# Patient Record
Sex: FEMALE | Race: WHITE | NOT HISPANIC OR LATINO | Employment: OTHER | URBAN - NONMETROPOLITAN AREA
[De-identification: names, ages, dates, MRNs, and addresses within clinical notes are randomized per-mention and may not be internally consistent; named-entity substitution may affect disease eponyms.]

---

## 2017-11-28 ENCOUNTER — OFFICE VISIT (OUTPATIENT)
Dept: GASTROENTEROLOGY | Facility: CLINIC | Age: 57
End: 2017-11-28

## 2017-11-28 VITALS
SYSTOLIC BLOOD PRESSURE: 112 MMHG | DIASTOLIC BLOOD PRESSURE: 78 MMHG | WEIGHT: 154 LBS | HEART RATE: 72 BPM | BODY MASS INDEX: 24.17 KG/M2 | HEIGHT: 67 IN

## 2017-11-28 DIAGNOSIS — R10.84 GENERALIZED ABDOMINAL PAIN: ICD-10-CM

## 2017-11-28 DIAGNOSIS — K21.00 GASTROESOPHAGEAL REFLUX DISEASE WITH ESOPHAGITIS: ICD-10-CM

## 2017-11-28 DIAGNOSIS — R11.0 NAUSEA: ICD-10-CM

## 2017-11-28 DIAGNOSIS — R19.7 DIARRHEA, UNSPECIFIED TYPE: Primary | ICD-10-CM

## 2017-11-28 LAB — AMYLASE SERPL-CCNC: 60 U/L (ref 25–140)

## 2017-11-28 PROCEDURE — 86003 ALLG SPEC IGE CRUDE XTRC EA: CPT | Performed by: PHYSICIAN ASSISTANT

## 2017-11-28 PROCEDURE — 82150 ASSAY OF AMYLASE: CPT | Performed by: INTERNAL MEDICINE

## 2017-11-28 PROCEDURE — 99214 OFFICE O/P EST MOD 30 MIN: CPT | Performed by: PHYSICIAN ASSISTANT

## 2017-11-28 PROCEDURE — 83690 ASSAY OF LIPASE: CPT | Performed by: PHYSICIAN ASSISTANT

## 2017-11-28 PROCEDURE — 83519 RIA NONANTIBODY: CPT | Performed by: PHYSICIAN ASSISTANT

## 2017-11-28 PROCEDURE — 83516 IMMUNOASSAY NONANTIBODY: CPT | Performed by: PHYSICIAN ASSISTANT

## 2017-11-28 PROCEDURE — 36415 COLL VENOUS BLD VENIPUNCTURE: CPT | Performed by: INTERNAL MEDICINE

## 2017-11-28 RX ORDER — DIVALPROEX SODIUM 500 MG/1
500 TABLET, DELAYED RELEASE ORAL 2 TIMES DAILY
COMMUNITY
End: 2018-06-26 | Stop reason: ALTCHOICE

## 2017-11-28 RX ORDER — PROPRANOLOL HYDROCHLORIDE 40 MG/1
40 TABLET ORAL 2 TIMES DAILY
COMMUNITY
End: 2018-06-26 | Stop reason: ALTCHOICE

## 2017-11-28 RX ORDER — DEXTROSE AND SODIUM CHLORIDE 5; .45 G/100ML; G/100ML
30 INJECTION, SOLUTION INTRAVENOUS CONTINUOUS PRN
Status: CANCELLED | OUTPATIENT
Start: 2017-12-06

## 2017-11-28 RX ORDER — PIOGLITAZONEHYDROCHLORIDE 30 MG/1
30 TABLET ORAL DAILY
COMMUNITY

## 2017-11-28 RX ORDER — PANTOPRAZOLE SODIUM 40 MG/1
40 TABLET, DELAYED RELEASE ORAL NIGHTLY
COMMUNITY
End: 2018-01-23 | Stop reason: ALTCHOICE

## 2017-11-28 RX ORDER — BUSPIRONE HYDROCHLORIDE 7.5 MG/1
7.5 TABLET ORAL 3 TIMES DAILY
COMMUNITY
End: 2018-01-23 | Stop reason: SDDI

## 2017-11-28 RX ORDER — SODIUM, POTASSIUM,MAG SULFATES 17.5-3.13G
SOLUTION, RECONSTITUTED, ORAL ORAL
Qty: 1 BOTTLE | Refills: 0 | Status: ON HOLD | OUTPATIENT
Start: 2017-11-28 | End: 2017-12-06

## 2017-11-29 LAB — LIPASE SERPL-CCNC: 104 U/L (ref 23–300)

## 2017-11-29 RX ORDER — PRAVASTATIN SODIUM 20 MG
20 TABLET ORAL DAILY
COMMUNITY

## 2017-11-29 RX ORDER — MELOXICAM 15 MG/1
15 TABLET ORAL DAILY
COMMUNITY

## 2017-11-30 LAB
ADV 40+41 DNA STL QL NAA+NON-PROBE: NOT DETECTED
ASTRO TYP 1-8 RNA STL QL NAA+NON-PROBE: NOT DETECTED
C CAYETANENSIS DNA STL QL NAA+NON-PROBE: NOT DETECTED
C DIFF TOX GENS STL QL NAA+PROBE: NOT DETECTED
CAMPY SP DNA.DIARRHEA STL QL NAA+PROBE: NOT DETECTED
CRYPTOSP STL CULT: NOT DETECTED
E COLI DNA SPEC QL NAA+PROBE: NOT DETECTED
E HISTOLYT AG STL-ACNC: NOT DETECTED
EAEC PAA PLAS AGGR+AATA ST NAA+NON-PRB: NOT DETECTED
EC STX1+STX2 GENES STL QL NAA+NON-PROBE: NOT DETECTED
EPEC EAE GENE STL QL NAA+NON-PROBE: NOT DETECTED
ETEC LTA+ST1A+ST1B TOX ST NAA+NON-PROBE: NOT DETECTED
G LAMBLIA DNA SPEC QL NAA+PROBE: NOT DETECTED
GLIADIN PEPTIDE IGA SER-ACNC: 5 UNITS (ref 0–19)
GLIADIN PEPTIDE IGG SER-ACNC: 4 UNITS (ref 0–19)
NOROVIRUS GI+II RNA STL QL NAA+NON-PROBE: NOT DETECTED
P SHIGELLOIDES DNA STL QL NAA+NON-PROBE: NOT DETECTED
RV RNA STL NAA+PROBE: NOT DETECTED
SALMONELLA DNA SPEC QL NAA+PROBE: NOT DETECTED
SAPO I+II+IV+V RNA STL QL NAA+NON-PROBE: NOT DETECTED
SHIGELLA SP+EIEC IPAH ST NAA+NON-PROBE: NOT DETECTED
TTG IGA SER-ACNC: <2 U/ML (ref 0–3)
TTG IGG SER-ACNC: <2 U/ML (ref 0–5)
V CHOLERAE DNA SPEC QL NAA+PROBE: NOT DETECTED
VIBRIO DNA SPEC NAA+PROBE: NOT DETECTED
YERSINIA STL CULT: NOT DETECTED

## 2017-11-30 PROCEDURE — 87507 IADNA-DNA/RNA PROBE TQ 12-25: CPT | Performed by: PHYSICIAN ASSISTANT

## 2017-12-01 ENCOUNTER — OFFICE VISIT (OUTPATIENT)
Dept: OTOLARYNGOLOGY | Facility: CLINIC | Age: 57
End: 2017-12-01

## 2017-12-01 VITALS — BODY MASS INDEX: 23.54 KG/M2 | OXYGEN SATURATION: 98 % | WEIGHT: 150 LBS | HEIGHT: 67 IN | HEART RATE: 85 BPM

## 2017-12-01 DIAGNOSIS — H60.63 CHRONIC NON-INFECTIVE OTITIS EXTERNA OF BOTH EARS, UNSPECIFIED TYPE: Primary | ICD-10-CM

## 2017-12-01 DIAGNOSIS — H90.3 ASYMMETRICAL SENSORINEURAL HEARING LOSS: ICD-10-CM

## 2017-12-01 DIAGNOSIS — J31.0 CHRONIC RHINITIS, UNSPECIFIED TYPE: ICD-10-CM

## 2017-12-01 LAB — TRYPSIN: 471 NG/ML (ref 169–773)

## 2017-12-01 PROCEDURE — 99213 OFFICE O/P EST LOW 20 MIN: CPT | Performed by: OTOLARYNGOLOGY

## 2017-12-01 RX ORDER — FLUTICASONE PROPIONATE 50 MCG
2 SPRAY, SUSPENSION (ML) NASAL DAILY
Qty: 16 G | Refills: 11 | Status: SHIPPED | OUTPATIENT
Start: 2017-12-01 | End: 2018-12-05 | Stop reason: SDUPTHER

## 2017-12-03 LAB
CALIF WALNUT POLN IGE QN: <0.1 KU/L
CODFISH IGE QN: <0.1 KU/L
CONV CLASS DESCRIPTION: NORMAL
COW MILK IGE QN: <0.1 KU/L
EGG WHITE IGE QN: <0.1 KU/L
GLUTEN IGE QN: <0.1 KU/L
HAZELNUT IGE QN: <0.1 KU/L
PEANUT IGE QN: <0.1 KU/L
SCALLOP IGE QN: <0.1 KU/L
SESAME SEED IGE: <0.1 KU/L
SHRIMP IGE: <0.1 KU/L
SOYBEAN IGE QN: <0.1 KU/L
WHEAT IGE QN: <0.1 KU/L

## 2017-12-04 NOTE — PROGRESS NOTES
Subjective   Sravanthi Ellison is a 57 y.o. female.       History of Present Illness   Patient has been followed with asymmetric sensorineural hearing loss primarily on the left that is been stable for quite some time.  Plan was to obtain an audiogram every other year.  Reports that earlier this year she suffered a traumatic brain injury and this is affected her memory somewhat.  Has not been using her Flonase.  Says her nose is congested a good bit.  Feels like her ears are about the same with no otorrhea doesn't feel like her hearing is changed.  Does not recall being told that she had a temporal bone injury.  No otorrhea.      The following portions of the patient's history were reviewed and updated as appropriate: allergies, current medications, past family history, past medical history, past social history, past surgical history and problem list.     reports that she has never smoked. She has never used smokeless tobacco. She reports that she does not drink alcohol or use illicit drugs.   Patient is not a tobacco user and has not been counseled for use of tobacco products      Review of Systems   Constitutional: Negative for fever.           Objective   Physical Exam    Ears: External ears no deformity, canal shows some squamous debris bilaterally that cleaned under the microscope.  Beyond this, tympanic membranes intact clear and mobile bilaterally.  Nose: Nares show mild clear discharge, no mass polyp or purulence.  Boggy mucosa is present.  No gross external deformity.  Septum: Midline  Oral cavity: Lips and gums without lesions.  Tongue and floor of mouth without lesions.  Parotid and submandibular ducts unobstructed.  No mucosal lesions on the buccal mucosa or vestibule of the mouth.  Pharynx: No erythema exudate mass or ulcer  Neck: No lymphadenopathy.  No thyromegaly.  Trachea and larynx midline.  No masses in the parotid or submandibular glands.      Assessment/Plan   Sravanthi was seen today for  follow-up.    Diagnoses and all orders for this visit:    Chronic non-infective otitis externa of both ears, unspecified type    Chronic rhinitis, unspecified type    Asymmetrical sensorineural hearing loss        Plan: Restart Flonase 2 sprays each nostril daily.  Ears cleaned as described above.  Since the patient's hearing is not subjectively changed just reevaluate in 1 year as was previously planned, call sooner for problems.

## 2017-12-06 ENCOUNTER — HOSPITAL ENCOUNTER (OUTPATIENT)
Facility: HOSPITAL | Age: 57
Setting detail: HOSPITAL OUTPATIENT SURGERY
Discharge: HOME | End: 2017-12-06
Attending: INTERNAL MEDICINE | Admitting: INTERNAL MEDICINE

## 2017-12-06 ENCOUNTER — ANESTHESIA (OUTPATIENT)
Dept: GASTROENTEROLOGY | Facility: HOSPITAL | Age: 57
End: 2017-12-06

## 2017-12-06 ENCOUNTER — ANESTHESIA EVENT (OUTPATIENT)
Dept: GASTROENTEROLOGY | Facility: HOSPITAL | Age: 57
End: 2017-12-06

## 2017-12-06 VITALS
TEMPERATURE: 96.8 F | BODY MASS INDEX: 23.78 KG/M2 | RESPIRATION RATE: 20 BRPM | OXYGEN SATURATION: 100 % | SYSTOLIC BLOOD PRESSURE: 95 MMHG | HEART RATE: 82 BPM | DIASTOLIC BLOOD PRESSURE: 60 MMHG | WEIGHT: 151.5 LBS | HEIGHT: 67 IN

## 2017-12-06 DIAGNOSIS — K21.00 GASTROESOPHAGEAL REFLUX DISEASE WITH ESOPHAGITIS: ICD-10-CM

## 2017-12-06 DIAGNOSIS — R10.84 GENERALIZED ABDOMINAL PAIN: ICD-10-CM

## 2017-12-06 DIAGNOSIS — R19.7 DIARRHEA, UNSPECIFIED TYPE: ICD-10-CM

## 2017-12-06 DIAGNOSIS — R11.0 NAUSEA: ICD-10-CM

## 2017-12-06 LAB — GLUCOSE BLDC GLUCOMTR-MCNC: 81 MG/DL (ref 70–130)

## 2017-12-06 PROCEDURE — 88305 TISSUE EXAM BY PATHOLOGIST: CPT | Performed by: PATHOLOGY

## 2017-12-06 PROCEDURE — 88342 IMHCHEM/IMCYTCHM 1ST ANTB: CPT | Performed by: PATHOLOGY

## 2017-12-06 PROCEDURE — 25010000002 PROPOFOL 10 MG/ML EMULSION: Performed by: NURSE ANESTHETIST, CERTIFIED REGISTERED

## 2017-12-06 PROCEDURE — 45380 COLONOSCOPY AND BIOPSY: CPT | Performed by: INTERNAL MEDICINE

## 2017-12-06 PROCEDURE — 25010000002 MIDAZOLAM PER 1 MG: Performed by: NURSE ANESTHETIST, CERTIFIED REGISTERED

## 2017-12-06 PROCEDURE — 88305 TISSUE EXAM BY PATHOLOGIST: CPT | Performed by: INTERNAL MEDICINE

## 2017-12-06 PROCEDURE — 88342 IMHCHEM/IMCYTCHM 1ST ANTB: CPT | Performed by: INTERNAL MEDICINE

## 2017-12-06 PROCEDURE — 43239 EGD BIOPSY SINGLE/MULTIPLE: CPT | Performed by: INTERNAL MEDICINE

## 2017-12-06 PROCEDURE — 82962 GLUCOSE BLOOD TEST: CPT

## 2017-12-06 RX ORDER — LIDOCAINE HYDROCHLORIDE 10 MG/ML
INJECTION, SOLUTION INFILTRATION; PERINEURAL AS NEEDED
Status: DISCONTINUED | OUTPATIENT
Start: 2017-12-06 | End: 2017-12-06 | Stop reason: SURG

## 2017-12-06 RX ORDER — DEXTROSE AND SODIUM CHLORIDE 5; .45 G/100ML; G/100ML
30 INJECTION, SOLUTION INTRAVENOUS CONTINUOUS PRN
Status: DISCONTINUED | OUTPATIENT
Start: 2017-12-06 | End: 2017-12-06 | Stop reason: HOSPADM

## 2017-12-06 RX ORDER — PROPOFOL 10 MG/ML
VIAL (ML) INTRAVENOUS AS NEEDED
Status: DISCONTINUED | OUTPATIENT
Start: 2017-12-06 | End: 2017-12-06 | Stop reason: SURG

## 2017-12-06 RX ORDER — MIDAZOLAM HYDROCHLORIDE 1 MG/ML
INJECTION INTRAMUSCULAR; INTRAVENOUS AS NEEDED
Status: DISCONTINUED | OUTPATIENT
Start: 2017-12-06 | End: 2017-12-06 | Stop reason: SURG

## 2017-12-06 RX ADMIN — LIDOCAINE HYDROCHLORIDE 60 MG: 10 INJECTION, SOLUTION INFILTRATION; PERINEURAL at 10:43

## 2017-12-06 RX ADMIN — PROPOFOL 20 MG: 10 INJECTION, EMULSION INTRAVENOUS at 10:55

## 2017-12-06 RX ADMIN — PROPOFOL 20 MG: 10 INJECTION, EMULSION INTRAVENOUS at 10:58

## 2017-12-06 RX ADMIN — PROPOFOL 30 MG: 10 INJECTION, EMULSION INTRAVENOUS at 10:45

## 2017-12-06 RX ADMIN — MIDAZOLAM 2 MG: 1 INJECTION INTRAMUSCULAR; INTRAVENOUS at 10:43

## 2017-12-06 RX ADMIN — PROPOFOL 20 MG: 10 INJECTION, EMULSION INTRAVENOUS at 10:50

## 2017-12-06 RX ADMIN — PROPOFOL 20 MG: 10 INJECTION, EMULSION INTRAVENOUS at 10:52

## 2017-12-06 RX ADMIN — DEXTROSE AND SODIUM CHLORIDE 30 ML/HR: 5; 450 INJECTION, SOLUTION INTRAVENOUS at 10:11

## 2017-12-06 RX ADMIN — PROPOFOL 70 MG: 10 INJECTION, EMULSION INTRAVENOUS at 10:43

## 2017-12-06 RX ADMIN — PROPOFOL 20 MG: 10 INJECTION, EMULSION INTRAVENOUS at 10:48

## 2017-12-06 NOTE — ANESTHESIA PREPROCEDURE EVALUATION
Anesthesia Evaluation     no history of anesthetic complications:  NPO Solid Status: > 8 hours  NPO Liquid Status: > 2 hours     Airway   Mallampati: II  TM distance: >3 FB  Neck ROM: full  no difficulty expected  Dental - normal exam     Pulmonary - negative pulmonary ROS and normal exam   Cardiovascular - negative cardio ROS and normal exam        Neuro/Psych    ROS Comment: 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

## 2017-12-06 NOTE — PLAN OF CARE
Problem: Patient Care Overview (Adult)  Goal: Plan of Care Review    12/06/17 1102   Coping/Psychosocial Response Interventions   Plan Of Care Reviewed With patient   Patient Care Overview   Progress no change   Outcome Evaluation   Outcome Summary/Follow up Plan vss         Problem: GI Endoscopy (Adult)  Goal: Signs and Symptoms of Listed Potential Problems Will be Absent or Manageable (GI Endoscopy)    12/06/17 1102   GI Endoscopy   Problems Assessed (GI Endoscopy) all   Problems Present (GI Endoscopy) none

## 2017-12-06 NOTE — ANESTHESIA POSTPROCEDURE EVALUATION
Patient: Sravanthi Ellison    Procedure Summary     Date Anesthesia Start Anesthesia Stop Room / Location    12/06/17 1043 1102 [facility] ENDOSCOPY 1 / [facility]       Procedure Diagnosis Surgeon Provider    ESOPHAGOGASTRODUODENOSCOPY--small bowel biopsies (N/A Esophagus); COLONOSCOPY--look TI, bx (N/A ) Nausea; Generalized abdominal pain; Gastroesophageal reflux disease with esophagitis; Diarrhea, unspecified type  (Nausea [R11.0]; Generalized abdominal pain [R10.84]; Gastroesophageal reflux disease with esophagitis [K21.0]; Diarrhea, unspecified type [R19.7]) MD Farzaneh Sandoval CRNA          Anesthesia Type: MAC  Last vitals  BP   114/77 (12/06/17 0956)   Temp   97.9 °F (36.6 °C) (12/06/17 0956)   Pulse   73 (12/06/17 0956)   Resp   18 (12/06/17 0956)     SpO2   98 % (12/06/17 0956)     Post Anesthesia Care and Evaluation    Patient location during evaluation: bedside  Patient participation: complete - patient participated  Level of consciousness: awake and alert  Pain management: adequate  Airway patency: patent  Anesthetic complications: No anesthetic complications  PONV Status: none  Cardiovascular status: acceptable  Respiratory status: acceptable  Hydration status: acceptable

## 2017-12-06 NOTE — H&P (VIEW-ONLY)
Chief Complaint   Patient presents with   • Abdominal Pain     Ref. Tracey ZEPEDA   • Spastic Colon       ENDO PROCEDURE ORDERED: EGD/COLON, GERD, N/D, abd pain-==BX small bowel, look TI, bx     Subjective    Sravanthi Ellison is a 57 y.o. female. she is being seen for consultation today at the request of DUANE Vanegas.    History of Present Illness    This 57-year-old retired female was sent for consultation for abdominal pain, diarrhea by Ijeoma Joseph who saw the patient with worsening IBS on 9/29/17.  Laboratories from her office on 10/17/17 showed normal cholesterol, CMP showed glucose 159, otherwise normal.  A1c 6.5%.  Normal CBC, B12, valproic acid, urinalysis showed glucose with a UTI.  She does see Dr. Botello after the first of the year.  She has had a previous cholecystectomy.  She last saw Dr. Armas on 1/18/12 showed a hiatal hernia, diverticulosis, he treated her with Protonix and Xifaxan but she does not recall whether this improved her symptoms.  She states she recently had a traumatic brain injury in June and has been recovering from that.    Patient presents with 2 out of 10 mid-upper abdominal pain.  She feels soreness in the epigastric region.  She states she's had a lot of problems with her stomach.  She is on Protonix 40 mg daily.  She complains nausea but no vomiting.  She denied dysphagia.  She will sometimes have urgent bowel movements after she eats.  Sometimes they initially will not pass, then she will have a gastric stool.  She doesn't describe any oral or floating stools.  She seen bright red blood on the tissue at times.  Her weight is down 30 pounds since she last saw Dr. Armas.    Last EGD by Dr. Rodriguez on 11/12/15 showed esophagitis, gastritis, hiatal hernia, biopsy showed chronic inflammation with negative H. pylori.  Last EGD/colonoscopy by Dr. Armas on 12/12/11 showed 3 cm hiatal hernia, gastritis, diverticulosis with normal terminal ileum.    Patient denies  tobacco, alcohol, illicit substance use.  She fell off a horse with a traumatic brain injury in .  She has diabetes, she's had a breast biopsy, ear surgery, cholecystectomy.  Family history diabetes, heart disease, cancer, hypertension.  Father  age 83 with heart failure.  Mother  age 59, spouse at age 60 both of cancer.  2 brothers, one sister living in good health.    A/P: Patient is on multiple medications which may be contributing to her symptoms, she has nausea, abdominal pain, variable bowel habits, weight loss, blood in her stool, known diverticular disease.  Recommend EGD/colonoscopy, we'll do biopsies of the small bowel to evaluate for possible celiac disease.  Recommend colonoscopy with biopsies will also attempt to look at the terminal ileum.  I recommended amylase, lipase, recurrent GI distress panel, serum trypsin, stool pathogen panel.  I'm suspicious for pancreatic insufficiency.  We'll see her in follow-up after the above, further pending clinical course and the results of the above.    Thank you very much Ijeoma for this consultation, and for allowing us to participate in the care of your patient.  We'll keep you informed.     The following portions of the patient's history were reviewed and updated as appropriate:   Past Medical History:   Diagnosis Date   • Diabetes mellitus    • Diarrhea    • Diverticular disease of colon    • Gastroesophageal reflux disease    • Hiatal hernia    • Irritable bowel syndrome    • Nausea    • Right upper quadrant pain    • Traumatic brain injury 2017     Past Surgical History:   Procedure Laterality Date   • CHOLECYSTECTOMY     • COLONOSCOPY  2011    Mild diverticulosis in sigmoid colon. Stool collected for study.   • ENDOSCOPY W/ PEG TUBE PLACEMENT  2011    Normal hypopharynx, esophagus, symmetrical & patent pylorus. Hiatus hernia in GE junction. Non-erosive gastritis in stomach. Multiple biopsies taken. Normal duodenum. Multiple biopsies  taken.   • UPPER GASTROINTESTINAL ENDOSCOPY  12/12/2011     Family History   Problem Relation Age of Onset   • Breast cancer Other    • Diabetes Other    • Heart disease Other    • Hypertension Other    • Skin cancer Other      OB History     No data available        No Known Allergies  Social History     Social History   • Marital status:      Spouse name: N/A   • Number of children: N/A   • Years of education: N/A     Social History Main Topics   • Smoking status: Never Smoker   • Smokeless tobacco: Never Used   • Alcohol use No   • Drug use: No   • Sexual activity: Defer     Other Topics Concern   • None     Social History Narrative       Current Outpatient Prescriptions:   •  Bromocriptine Mesylate (CYCLOSET) 0.8 MG tablet, Take 0.8 mg by mouth Daily. 6 TABLETS DAILY, Disp: , Rfl:   •  busPIRone (BUSPAR) 7.5 MG tablet, Take 7.5 mg by mouth 3 (Three) Times a Day., Disp: , Rfl:   •  divalproex (DEPAKOTE) 500 MG DR tablet, Take 500 mg by mouth 2 (Two) Times a Day., Disp: , Rfl:   •  Empagliflozin (JARDIANCE) 25 MG tablet, Take 25 mg by mouth Daily., Disp: , Rfl:   •  Liraglutide (VICTOZA) 18 MG/3ML solution pen-injector injection, Inject 1.8 mg under the skin Daily., Disp: , Rfl:   •  MELATONIN PO, Take 3 mg by mouth Every Night., Disp: , Rfl:   •  metFORMIN (GLUCOPHAGE) 1000 MG tablet, Take 1,000 mg by mouth 2 (Two) Times a Day With Meals., Disp: , Rfl:   •  pantoprazole (PROTONIX) 40 MG EC tablet, Take 40 mg by mouth Every Night., Disp: , Rfl:   •  pioglitazone (ACTOS) 30 MG tablet, Take 30 mg by mouth Daily., Disp: , Rfl:   •  propranolol (INDERAL) 40 MG tablet, Take 40 mg by mouth 2 (Two) Times a Day., Disp: , Rfl:   •  fluticasone (FLONASE) 50 MCG/ACT nasal spray, 2 sprays into each nostril Daily., Disp: 16 g, Rfl: 11  •  meloxicam (MOBIC) 15 MG tablet, Take 15 mg by mouth Daily., Disp: , Rfl:   •  pravastatin (PRAVACHOL) 20 MG tablet, Take 20 mg by mouth Daily., Disp: , Rfl:   •   "sodium-potassium-magnesium sulfates (SUPREP) 17.5-3.13-1.6 GM/180ML solution oral solution, As directed per instruction sheet for colonoscopy, Disp: 1 bottle, Rfl: 0  Review of Systems  Review of Systems   Constitutional: Positive for unexpected weight change.   HENT: Negative for trouble swallowing.    Respiratory: Negative for apnea.    Gastrointestinal: Positive for abdominal pain, blood in stool, constipation, diarrhea and nausea. Negative for abdominal distention, anal bleeding, rectal pain and vomiting.   Genitourinary: Negative for difficulty urinating.   All other systems reviewed and are negative.         Objective    /78 (BP Location: Left arm)  Pulse 72  Ht 67\" (170.2 cm)  Wt 154 lb (69.9 kg)  BMI 24.12 kg/m2  Physical Exam   Constitutional: She is oriented to person, place, and time. She appears well-developed and well-nourished. No distress.   HENT:   Head: Normocephalic and atraumatic.   Eyes: EOM are normal. Pupils are equal, round, and reactive to light.   Neck: Normal range of motion.   Cardiovascular: Normal rate, regular rhythm and normal heart sounds.    Pulmonary/Chest: Effort normal and breath sounds normal.   Abdominal: Soft. Bowel sounds are normal. She exhibits distension. She exhibits no shifting dullness, no abdominal bruit, no ascites and no mass. There is no hepatosplenomegaly. There is tenderness. There is no rigidity, no rebound, no guarding and no CVA tenderness. No hernia. Hernia confirmed negative in the ventral area.   Mild diffuse, scar   Musculoskeletal: Normal range of motion.   Neurological: She is alert and oriented to person, place, and time.   Skin: Skin is warm and dry.   Psychiatric: She has a normal mood and affect. Her behavior is normal. Judgment and thought content normal.   Nursing note and vitals reviewed.    Assessment/Plan      1. Diarrhea, unspecified type    2. Generalized abdominal pain    3. Nausea    4. Gastroesophageal reflux disease with " esophagitis    .   Sravanthi was seen today for abdominal pain and spastic colon.    Diagnoses and all orders for this visit:    Diarrhea, unspecified type  -     Recurrent Gastrointestinal Distress  -     Gastrointestinal Panel, PCR - Stool, Per Rectum  -     Amylase  -     Lipase  -     Trypsin  -     Case Request; Standing  -     dextrose 5 % and sodium chloride 0.45 % infusion; Infuse 30 mL/hr into a venous catheter Continuous As Needed (Start Prior to Procedure).  -     Case Request  -     Allergens (12) Foods  -     Glia(IgA / G) & TTG(IgA / G)    Generalized abdominal pain  -     Recurrent Gastrointestinal Distress  -     Gastrointestinal Panel, PCR - Stool, Per Rectum  -     Amylase  -     Lipase  -     Trypsin  -     Case Request; Standing  -     dextrose 5 % and sodium chloride 0.45 % infusion; Infuse 30 mL/hr into a venous catheter Continuous As Needed (Start Prior to Procedure).  -     Case Request  -     Allergens (12) Foods  -     Glia(IgA / G) & TTG(IgA / G)    Nausea  -     Recurrent Gastrointestinal Distress  -     Gastrointestinal Panel, PCR - Stool, Per Rectum  -     Amylase  -     Lipase  -     Trypsin  -     Case Request; Standing  -     dextrose 5 % and sodium chloride 0.45 % infusion; Infuse 30 mL/hr into a venous catheter Continuous As Needed (Start Prior to Procedure).  -     Case Request  -     Allergens (12) Foods  -     Glia(IgA / G) & TTG(IgA / G)    Gastroesophageal reflux disease with esophagitis  -     Recurrent Gastrointestinal Distress  -     Gastrointestinal Panel, PCR - Stool, Per Rectum  -     Amylase  -     Lipase  -     Trypsin  -     Case Request; Standing  -     dextrose 5 % and sodium chloride 0.45 % infusion; Infuse 30 mL/hr into a venous catheter Continuous As Needed (Start Prior to Procedure).  -     Case Request  -     Allergens (12) Foods  -     Glia(IgA / G) & TTG(IgA / G)    Other orders  -     Obtain Informed Consent; Standing  -     POC Glucose Fingerstick; Standing  -      sodium-potassium-magnesium sulfates (SUPREP) 17.5-3.13-1.6 GM/180ML solution oral solution; As directed per instruction sheet for colonoscopy        Orders placed during this encounter include:  Orders Placed This Encounter   Procedures   • Gastrointestinal Panel, PCR - Stool, Per Rectum   • Recurrent Gastrointestinal Distress   • Amylase   • Lipase   • Trypsin   • Allergens (12) Foods   • Glia(IgA / G) & TTG(IgA / G)       Medications prescribed:  New Medications Ordered This Visit   Medications   • sodium-potassium-magnesium sulfates (SUPREP) 17.5-3.13-1.6 GM/180ML solution oral solution     Sig: As directed per instruction sheet for colonoscopy     Dispense:  1 bottle     Refill:  0     Discontinued Medications       Reason for Discontinue    CANAGLIFLOZIN PO Discontinued by another clinician    LIRAGLUTIDE SC Discontinued by another clinician    meloxicam (MOBIC) 15 MG tablet Discontinued by another clinician    PIOGLITAZONE HCL PO Discontinued by another clinician    pravastatin (PRAVACHOL) 20 MG tablet Discontinued by another clinician        Requested Prescriptions     Signed Prescriptions Disp Refills   • sodium-potassium-magnesium sulfates (SUPREP) 17.5-3.13-1.6 GM/180ML solution oral solution 1 bottle 0     Sig: As directed per instruction sheet for colonoscopy       Review and/or summary of lab tests, radiology, procedures, medications. Review and summary of old records and obtaining of history. The risks and benefits of my recommendations, as well as other treatment options were discussed with the patient today. Questions were answered.    Follow-up: Return in about 6 weeks (around 1/9/2018), or if symptoms worsen or fail to improve.     ESOPHAGOGASTRODUODENOSCOPY--small bowel biopsies (N/A), COLONOSCOPY--look TI, bx (N/A)      This document has been electronically signed by Abel Laboy PA-C on December 3, 2017 3:06 PM      Results for orders placed or performed in visit on 11/28/17    Gastrointestinal Panel, PCR - Stool, Per Rectum   Result Value Ref Range    Campylobacter Not Detected Not Detected    Clostridium difficile (toxin A/B) Not Detected Not Detected, NA formed stool, C diff not applicable on patient less than one year of age    Plesiomonas shigelloides Not Detected Not Detected    Salmonella Not Detected Not Detected    Vibrio Not Detected Not Detected    Vibrio cholerae Not Detected Not Detected    Yersinia enterocolitica Not Detected Not Detected    Enteroaggregative E. coli (EAEC) Not Detected Not Detected    Enteropathogenic E. coli (EPEC) Not Detected Not Detected    Enterotoxigenic E. coli (ETEC) lt/st Not Detected Not Detected    Shiga-like toxin-producing E. coli (STEC) stx1/stx2 Not Detected Not Detected    E. coli O157 Not Detected Not Detected    Shigella/Enteroinvasive E. coli (EIEC) Not Detected Not Detected    Cryptosporidium Not Detected Not Detected    Cyclospora cayetanensis Not Detected Not Detected    Entamoeba histolytica Not Detected Not Detected    Giardia lamblia Not Detected Not Detected    Adenovirus F40/41 Not Detected Not Detected    Astrovirus Not Detected Not Detected    Norovirus GI/GII Not Detected Not Detected    Rotavirus A Not Detected Not Detected    Sapovirus (I, II, IV or V) Not Detected Not Detected   Glia(IgA / G) & TTG(IgA / G)   Result Value Ref Range    Gliadin Deamidated Peptide Ab, IgA 5 0 - 19 units    Deaminated Gliadin Ab IgG 4 0 - 19 units    Tissue Transglutaminase IgA <2 0 - 3 U/mL    Tissue Transglutaminase IgG <2 0 - 5 U/mL   Allergens (12) Foods   Result Value Ref Range    Class Description Comment     Egg White <0.10 Class 0 kU/L    Milk, Cow's <0.10 Class 0 kU/L    CodFish <0.10 Class 0 kU/L    Sesame Seed <0.10 Class 0 kU/L    Peanut <0.10 Class 0 kU/L    Soybean <0.10 Class 0 kU/L    Hazelnut <0.10 Class 0 kU/L    Shrimp <0.10 Class 0 kU/L    Scallop <0.10 Class 0 kU/L    Gluten <0.10 Class 0 kU/L    Eros <0.10 Class 0 kU/L     Wheat <0.10 Class 0 kU/L   Trypsin   Result Value Ref Range    Trypsin 471 169 - 773 ng/mL   Lipase   Result Value Ref Range    Lipase 104 23 - 300 U/L   Amylase   Result Value Ref Range    Amylase 60 25 - 140 U/L   Results for orders placed or performed in visit on 11/07/16    COLONOSCOPY   Result Value Ref Range     Colonoscopy completed; ordered by Dr. Ijeoma Joseph    Results for orders placed or performed in visit on 12/12/11   Converted Surgical Pathology   Result Value Ref Range    Spec Descr 1 SPECIMEN(S): A SMALL BOWEL BIOPSY     Specimen description 2 SPECIMEN(S): B GASTRIC BIOPSY     Specimen description 3 SPECIMEN(S): C TERMINAL ILEUM BIOPSY     Specimen description 4 SPECIMEN(S): D MUCOSAL COLON BIOPSY     Preoperative Diagnosis   PREOPERATIVE DIAGNOSIS:  Diarrhea       Postoperative Diagnosis         POSTOPERATIVE DIAGNOSIS:  Small bowel (A), gastric mucosa (B), terminal ileum (C), colonic mucosa  (D)      Gross Description         GROSS DESCRIPTION:  In 4 containers, each of these show mucosal biopsies measuring up to 0.3  cm in greatest dimension.  Embedded accordingly.  (A) small bowel, (B)  gastric mucosa, (C) terminal ileum, (D) colonic mucosa.      Final Diagnosis         FINAL DIAGNOSIS:  A.  SMALL BOWEL, MUCOSAL BIOPSY:             NO SIGNIFICANT PATHOLOGIC DIAGNOSIS.  B.  GASTRIC MUCOSAL BIOPSY:             MILD CHRONIC INFLAMMATION.             NO EVIDENCE OF HELICOBACTER PYLORI (IP STAIN PERFORMED).  C.  TERMINAL ILEUM, MUCOSAL BIOPSY:             NO SIGNIFICANT PATHOLOGIC DIAGNOSIS.  D.  COLONIC MUCOSAL BIOPSY, RANDOM:             NO SIGNIFICANT PATHOLOGIC DIAGNOSIS.      Comment         COMMENT:  Helicobacter immunoperoxidase stain performed on block B.  HP immunostain was developed and its performance characteristics  determined by [facility].  It has not been  cleared or approved by the U.S. Food and Drug Administration.  The FDA  has determined that  "such clearance or approval is not necessary.  This  test is used for clinical purposes.  It should not be regarded as  investigational or for research.  This laboratory is certified under the  Clinical Laboratory Improvement Amendments of 1988 (CLIA-88) as  qualified to perform high complexity clinical laboratory testing.      CONVERTED (HISTORICAL) FINAL PATHOLOGIST       Diagnostician:  BESSIE ESTES M.D.  Pathologist  Electronically Signed 12/13/2011      Diagnosis Code   DIAGNOSIS CODE:  4      Results for orders placed or performed in visit on 11/25/03   Converted Surgical Pathology   Result Value Ref Range    Spec Descr 1 SPECIMEN(S): A GASTRIC BIOPSY     Gross Description         GROSS DESCRIPTION:  The specimen is labeled \"gastric mucosa\" and consists of two tan  fragments measuring 0.8 x 0.2 x 0.3 cm together.  Totally submitted.      Final Diagnosis         FINAL DIAGNOSIS:  GASTRIC MUCOSA, BIOPSY:            CHRONIC GASTRITIS SHOWING MINIMAL ACTIVITY.            A SPECIAL STAIN FOR HELICOBACTER PYLORI IS NEGATIVE.  [facility]    DIAGNOSIS CODE:  6      Comment   CLINICAL DIAGNOSIS:  Gastric mucosa biopsy       CONVERTED (HISTORICAL) FINAL PATHOLOGIST       Diagnostician:  JOY ALVARADO M.D.  Pathologist  Electronically Signed 11/28/2003     Results for orders placed or performed in visit on 04/01/03   Converted Surgical Pathology   Result Value Ref Range    Spec Descr 1 SPECIMEN(S): A MUCOSAL COLON BIOPSY     Gross Description         GROSS DESCRIPTION:  The container is labeled \"mucosa, colon (cold biopsy)\" and has nodular  bits of white soft tissue 0.6 cc in aggregate.  The entire specimen is  embedded.      Final Diagnosis         FINAL DIAGNOSIS:  MUCOSA, COLON:              FRAGMENTS OF MUCOSA OF THE COLON WITH A FEW BENIGN                   LYMPHOID NODULES.  [facility] [place]    DIAGNOSIS CODE:  2      Comment   CLINICAL DIAGNOSIS:  Diarrhea       CONVERTED (HISTORICAL) FINAL PATHOLOGIST       Diagnostician:  " ALVARO HEREDIA M.D.  Pathologist  Electronically Signed 04/02/2003         Some portions of this note have been dictated using voice recognition software and may contain errors and/or omissions.

## 2017-12-07 LAB
LAB AP CASE REPORT: NORMAL
LAB AP DIAGNOSIS COMMENT: NORMAL
Lab: NORMAL
PATH REPORT.FINAL DX SPEC: NORMAL
PATH REPORT.GROSS SPEC: NORMAL

## 2018-01-23 ENCOUNTER — OFFICE VISIT (OUTPATIENT)
Dept: GASTROENTEROLOGY | Facility: CLINIC | Age: 58
End: 2018-01-23

## 2018-01-23 VITALS
WEIGHT: 149 LBS | HEIGHT: 67 IN | DIASTOLIC BLOOD PRESSURE: 68 MMHG | HEART RATE: 83 BPM | BODY MASS INDEX: 23.39 KG/M2 | SYSTOLIC BLOOD PRESSURE: 100 MMHG

## 2018-01-23 DIAGNOSIS — R19.7 DIARRHEA, UNSPECIFIED TYPE: Primary | ICD-10-CM

## 2018-01-23 DIAGNOSIS — R10.84 GENERALIZED ABDOMINAL PAIN: ICD-10-CM

## 2018-01-23 DIAGNOSIS — R11.0 NAUSEA: ICD-10-CM

## 2018-01-23 DIAGNOSIS — K21.00 GASTROESOPHAGEAL REFLUX DISEASE WITH ESOPHAGITIS: ICD-10-CM

## 2018-01-23 PROCEDURE — 99214 OFFICE O/P EST MOD 30 MIN: CPT | Performed by: PHYSICIAN ASSISTANT

## 2018-01-23 RX ORDER — HYOSCYAMINE SULFATE EXTENDED-RELEASE 0.38 MG/1
0.38 TABLET ORAL NIGHTLY
Qty: 60 TABLET | Refills: 3 | Status: SHIPPED | OUTPATIENT
Start: 2018-01-23 | End: 2018-03-06 | Stop reason: SINTOL

## 2018-01-23 RX ORDER — OMEPRAZOLE 40 MG/1
40 CAPSULE, DELAYED RELEASE ORAL DAILY
Refills: 0 | COMMUNITY
Start: 2018-01-08 | End: 2021-12-07

## 2018-01-23 RX ORDER — DULOXETIN HYDROCHLORIDE 30 MG/1
60 CAPSULE, DELAYED RELEASE ORAL DAILY
Refills: 0 | COMMUNITY
Start: 2017-12-20

## 2018-01-23 NOTE — PROGRESS NOTES
Chief Complaint   Patient presents with   • Diarrhea   • Abdominal Pain   • Nausea   • Gerd With Esophagitis       ENDO PROCEDURE ORDERED:    Subjective    Sravanthi Ellison is a 57 y.o. female. she is here today for follow-up.    History of Present Illness    The patient is seen on recheck of her GERD, abdominal pain, diarrhea.  She was last seen 11/28/2017.  The patient did have laboratories with suspicion for pancreatic insufficiency, but showed normal amylase and lipase, trypsin level, recurrent GI distress panel, stool pathogen panel was negative. Fecal fat was not done.  The patient presents with 2 out of 10 abdominal pain. She is on Prilosec 40 mg daily for chronic GERD.  She denies vomiting.  She denies dysphagia.  She states she may go up to 3 days without a bowel movement, then has 7 or 8 bowel movements that are very watery.  Sometimes she is having problems with incontinence.  Her weight is down 5 pounds since last visit.      The patient had an EGD/colonoscopy in 2011 showed hiatal hernia, gastritis, diverticulosis.  Dr. Rodriguez did an EGD on 11/12/2015 showed a hiatal hernia, esophagitis, gastritis. The patient underwent EGD/colonoscopy on 12/07/2017 showed a diffuse gastritis.  Did not see a hiatal hernia in her photographs and her biopsy showed chronic gastritis, negative H-Pylori.  The duodenal biopsy was negative.  Colonoscopy showed internal/external hemorrhoids,fair prep.  Normal terminal ileum.  Biopsy from terminal ileum and colon were negative.  Recommended repeat colonoscopy in 5 years.    ASSESSMENT/PLAN:  Patient with diffuse gastritis, abdominal discomfort, nausea.  She is encouraged to avoid gastric irritants.  Encouraged to continue dietary modification and weight loss.  Will continue on the Prilosec. I suggested a trial on Levbid, I wrote the prescription for twice a day, but I suggested she start taking it at night to see if it helps with her bowel movement.  We discussed possible Kegel  exercises as well as perhaps physical [facility] evaluation [facility] floor dysfunction.  Will plan follow up in 6 weeks further pending clinical course and the results of above.                      The following portions of the patient's history were reviewed and updated as appropriate:   Past Medical History:   Diagnosis Date   • Diabetes mellitus    • Diarrhea    • Diverticular disease of colon    • Gastroesophageal reflux disease    • Hiatal hernia    • Irritable bowel syndrome    • Nausea    • Right upper quadrant pain    • Traumatic brain injury 06/2017     Past Surgical History:   Procedure Laterality Date   • CHOLECYSTECTOMY     • COLONOSCOPY  12/12/2011    Mild diverticulosis in sigmoid colon. Stool collected for study.   • COLONOSCOPY N/A 12/6/2017    Procedure: COLONOSCOPY--look TI, bx;  Surgeon: Marshal Staley MD;  Location: [facility] ENDOSCOPY;  Service:    • ENDOSCOPY N/A 12/6/2017    Procedure: ESOPHAGOGASTRODUODENOSCOPY--small bowel biopsies;  Surgeon: Marshal Staley MD;  Location: [facility] ENDOSCOPY;  Service:    • ENDOSCOPY W/ PEG TUBE PLACEMENT  12/12/2011    Normal hypopharynx, esophagus, symmetrical & patent pylorus. Hiatus hernia in GE junction. Non-erosive gastritis in stomach. Multiple biopsies taken. Normal duodenum. Multiple biopsies taken.   • UPPER GASTROINTESTINAL ENDOSCOPY  12/12/2011   • UPPER GASTROINTESTINAL ENDOSCOPY  12/06/2017     Family History   Problem Relation Age of Onset   • Breast cancer Other    • Diabetes Other    • Heart disease Other    • Hypertension Other    • Skin cancer Other      OB History     No data available        No Known Allergies  Social History     Social History   • Marital status:      Spouse name: N/A   • Number of children: N/A   • Years of education: N/A     Social History Main Topics   • Smoking status: Never Smoker   • Smokeless tobacco: Never Used   • Alcohol use No   • Drug use: No   • Sexual activity: Defer     Other  "Topics Concern   • None     Social History Narrative       Current Outpatient Prescriptions:   •  Bromocriptine Mesylate (CYCLOSET) 0.8 MG tablet, Take 0.8 mg by mouth Daily. 6 TABLETS DAILY, Disp: , Rfl:   •  divalproex (DEPAKOTE) 500 MG DR tablet, Take 500 mg by mouth 2 (Two) Times a Day., Disp: , Rfl:   •  DULoxetine (CYMBALTA) 30 MG capsule, take 1 tablet by mouth every morning for anxiety depression, Disp: , Rfl: 0  •  Empagliflozin (JARDIANCE) 25 MG tablet, Take 25 mg by mouth Daily., Disp: , Rfl:   •  fluticasone (FLONASE) 50 MCG/ACT nasal spray, 2 sprays into each nostril Daily., Disp: 16 g, Rfl: 11  •  Liraglutide (VICTOZA) 18 MG/3ML solution pen-injector injection, Inject 1.8 mg under the skin Daily., Disp: , Rfl:   •  MELATONIN PO, Take 3 mg by mouth Every Night., Disp: , Rfl:   •  meloxicam (MOBIC) 15 MG tablet, Take 15 mg by mouth Daily., Disp: , Rfl:   •  metFORMIN (GLUCOPHAGE) 1000 MG tablet, Take 1,000 mg by mouth 2 (Two) Times a Day With Meals., Disp: , Rfl:   •  omeprazole (priLOSEC) 40 MG capsule, Take 40 mg by mouth Daily., Disp: , Rfl: 0  •  pioglitazone (ACTOS) 30 MG tablet, Take 30 mg by mouth Daily., Disp: , Rfl:   •  pravastatin (PRAVACHOL) 20 MG tablet, Take 20 mg by mouth Daily., Disp: , Rfl:   •  propranolol (INDERAL) 40 MG tablet, Take 40 mg by mouth 2 (Two) Times a Day., Disp: , Rfl:   •  hyoscyamine (LEVBID) 0.375 MG 12 hr tablet, Take 1 tablet by mouth Every Night., Disp: 60 tablet, Rfl: 3  Review of Systems  Review of Systems       Objective    /68 (BP Location: Left arm)  Pulse 83  Ht 170.2 cm (67.01\")  Wt 67.6 kg (149 lb)  BMI 23.33 kg/m2  Physical Exam   Constitutional: She is oriented to person, place, and time. She appears well-developed and well-nourished. No distress.   HENT:   Head: Normocephalic and atraumatic.   Eyes: EOM are normal. Pupils are equal, round, and reactive to light.   Neck: Normal range of motion.   Cardiovascular: Normal rate, regular rhythm and " normal heart sounds.    Pulmonary/Chest: Effort normal and breath sounds normal.   Abdominal: Soft. Bowel sounds are normal. She exhibits distension. She exhibits no shifting dullness, no abdominal bruit, no ascites and no mass. There is no hepatosplenomegaly. There is tenderness. There is no rigidity, no rebound, no guarding and no CVA tenderness. No hernia. Hernia confirmed negative in the ventral area.   Mild diffuse, scar   Musculoskeletal: Normal range of motion.   Neurological: She is alert and oriented to person, place, and time.   Skin: Skin is warm and dry.   Psychiatric: She has a normal mood and affect. Her behavior is normal. Judgment and thought content normal.   Nursing note and vitals reviewed.    Assessment/Plan      1. Diarrhea, unspecified type    2. Generalized abdominal pain    3. Nausea    4. Gastroesophageal reflux disease with esophagitis    .   Sravanthi was seen today for diarrhea, abdominal pain, nausea and gerd with esophagitis.    Diagnoses and all orders for this visit:    Diarrhea, unspecified type    Generalized abdominal pain    Nausea    Gastroesophageal reflux disease with esophagitis    Other orders  -     hyoscyamine (LEVBID) 0.375 MG 12 hr tablet; Take 1 tablet by mouth Every Night.        Orders placed during this encounter include:  No orders of the defined types were placed in this encounter.      Medications prescribed:  New Medications Ordered This Visit   Medications   • hyoscyamine (LEVBID) 0.375 MG 12 hr tablet     Sig: Take 1 tablet by mouth Every Night.     Dispense:  60 tablet     Refill:  3     Discontinued Medications       Reason for Discontinue    busPIRone (BUSPAR) 7.5 MG tablet Non-compliance    pantoprazole (PROTONIX) 40 MG EC tablet Discontinued by another clinician        Requested Prescriptions     Signed Prescriptions Disp Refills   • hyoscyamine (LEVBID) 0.375 MG 12 hr tablet 60 tablet 3     Sig: Take 1 tablet by mouth Every Night.       Review and/or summary of  lab tests, radiology, procedures, medications. Review and summary of old records and obtaining of history. The risks and benefits of my recommendations, as well as other treatment options were discussed with the patient today. Questions were answered.    Follow-up: Return in about 6 weeks (around 3/6/2018), or if symptoms worsen or fail to improve.     * Surgery not found *      This document has been electronically signed by Abel Laboy PA-C on January 26, 2018 1:44 PM      Results for orders placed or performed during the hospital encounter of 12/06/17   Tissue Pathology Exam - Tissue, Small Intestine, Duodenum   Result Value Ref Range    Case Report       Surgical Pathology Report                         Case: [id number]                                  Authorizing Provider:  Marshal Staley MD         Collected:           12/06/2017 10:50 AM          Ordering Location:     [facility]             Received:            12/06/2017 12:01 PM                                 [facility]                                                     Pathologist:           Jared Kelley MD                                                          Specimens:   1) - Small Intestine, Duodenum, small bowel bx                                                      2) - Gastric, Antrum, antrum bx                                                                     3) - Small Intestine, Ileum, ti bx                                                                  4) - Large Intestine, colonic mucosa bx                                                    Final Diagnosis       1.  MUCOSA, DUODENUM:  NO SIGNIFICANT HISTOLOGIC ABNORMALITY.    2.  MUCOSA, ANTRUM OF STOMACH:  CHRONIC GASTRITIS WITH BENIGN LYMPHOID AGGREGATES AND CONGESTION.  NEGATIVE FOR HELICOBACTER PYLORI (HP IMMUNOSTAIN).    3.  MUCOSA, TERMINAL ILEUM:  NO SIGNIFICANT HISTOLOGIC ABNORMALITY.    4.  MUCOSA, COLON:  NO SIGNIFICANT HISTOLOGIC ABNORMALITY.       Comment       Helicobacter pylori (HP) immunostain is performed because an appropriate inflammatory milieu is present and organisms are not seen on H & E stained slides.    HP immunostain was developed and [facility].  It has not been cleared or approved by the U.S. Food and Drug Administration.  The FDA has determined that such clearance or approval is not necessary.  This test is used for clinical purposes.  It should not be regarded as investigational or for research.  This laboratory is certified under the Clinical Laboratory Improvement Amendments of 1988 (CLIA-88) as qualified to perform high complexity clinical laboratory testing.            Gross Description       Received for examination are 4 containers, each of which have nodular bits of white soft tissue measuring 0.3-0.5 cc in aggregate.  All specimens are embedded as labeled.  1A duodenum; 2A antrum of stomach; 3A terminal ileum; 4A mucosa of colon.      Embedded Images     POC Glucose Fingerstick   Result Value Ref Range    Glucose 81 70 - 130 mg/dL   Results for orders placed or performed in visit on 11/28/17   Gastrointestinal Panel, PCR - Stool, Per Rectum   Result Value Ref Range    Campylobacter Not Detected Not Detected    Clostridium difficile (toxin A/B) Not Detected Not Detected, NA formed stool, C diff not applicable on patient less than one year of age    Plesiomonas shigelloides Not Detected Not Detected    Salmonella Not Detected Not Detected    Vibrio Not Detected Not Detected    Vibrio cholerae Not Detected Not Detected    Yersinia enterocolitica Not Detected Not Detected    Enteroaggregative E. coli (EAEC) Not Detected Not Detected    Enteropathogenic E. coli (EPEC) Not Detected Not Detected    Enterotoxigenic E. coli (ETEC) lt/st Not Detected Not Detected    Shiga-like toxin-producing E. coli (STEC) stx1/stx2 Not Detected Not Detected    E. coli O157  Not Detected Not Detected    Shigella/Enteroinvasive E. coli (EIEC) Not Detected Not Detected    Cryptosporidium Not Detected Not Detected    Cyclospora cayetanensis Not Detected Not Detected    Entamoeba histolytica Not Detected Not Detected    Giardia lamblia Not Detected Not Detected    Adenovirus F40/41 Not Detected Not Detected    Astrovirus Not Detected Not Detected    Norovirus GI/GII Not Detected Not Detected    Rotavirus A Not Detected Not Detected    Sapovirus (I, II, IV or V) Not Detected Not Detected   Glia(IgA / G) & TTG(IgA / G)   Result Value Ref Range    Gliadin Deamidated Peptide Ab, IgA 5 0 - 19 units    Deaminated Gliadin Ab IgG 4 0 - 19 units    Tissue Transglutaminase IgA <2 0 - 3 U/mL    Tissue Transglutaminase IgG <2 0 - 5 U/mL   Allergens (12) Foods   Result Value Ref Range    Class Description Comment     Egg White <0.10 Class 0 kU/L    Milk, Cow's <0.10 Class 0 kU/L    CodFish <0.10 Class 0 kU/L    Sesame Seed <0.10 Class 0 kU/L    Peanut <0.10 Class 0 kU/L    Soybean <0.10 Class 0 kU/L    Hazelnut <0.10 Class 0 kU/L    Shrimp <0.10 Class 0 kU/L    Scallop <0.10 Class 0 kU/L    Gluten <0.10 Class 0 kU/L    [place] <0.10 Class 0 kU/L    Wheat <0.10 Class 0 kU/L   Trypsin   Result Value Ref Range    Trypsin 471 169 - 773 ng/mL   Lipase   Result Value Ref Range    Lipase 104 23 - 300 U/L   Amylase   Result Value Ref Range    Amylase 60 25 - 140 U/L   Results for orders placed or performed in visit on 11/07/16    COLONOSCOPY   Result Value Ref Range     Colonoscopy completed; ordered by Dr. Ijeoma Joseph    Results for orders placed or performed in visit on 12/12/11   Converted Surgical Pathology   Result Value Ref Range    Spec Descr 1 SPECIMEN(S): A SMALL BOWEL BIOPSY     Specimen description 2 SPECIMEN(S): B GASTRIC BIOPSY     Specimen description 3 SPECIMEN(S): C TERMINAL ILEUM BIOPSY     Specimen description 4 SPECIMEN(S): D MUCOSAL COLON BIOPSY     Preoperative Diagnosis   PREOPERATIVE  "DIAGNOSIS:  Diarrhea       Postoperative Diagnosis         POSTOPERATIVE DIAGNOSIS:  Small bowel (A), gastric mucosa (B), terminal ileum (C), colonic mucosa  (D)      Gross Description         GROSS DESCRIPTION:  In 4 containers, each of these show mucosal biopsies measuring up to 0.3  cm in greatest dimension.  Embedded accordingly.  (A) small bowel, (B)  gastric mucosa, (C) terminal ileum, (D) colonic mucosa.      Final Diagnosis         FINAL DIAGNOSIS:  A.  SMALL BOWEL, MUCOSAL BIOPSY:             NO SIGNIFICANT PATHOLOGIC DIAGNOSIS.  B.  GASTRIC MUCOSAL BIOPSY:             MILD CHRONIC INFLAMMATION.             NO EVIDENCE OF HELICOBACTER PYLORI (IP STAIN PERFORMED).  C.  TERMINAL ILEUM, MUCOSAL BIOPSY:             NO SIGNIFICANT PATHOLOGIC DIAGNOSIS.  D.  COLONIC MUCOSAL BIOPSY, RANDOM:             NO SIGNIFICANT PATHOLOGIC DIAGNOSIS.      Comment         COMMENT:  Helicobacter immunoperoxidase stain performed on block B.  HP immunostain was developed and its performance characteristics  determined by [facility].  It has not been  cleared or approved by the U.S. Food and Drug Administration.  The FDA  has determined that such clearance or approval is not necessary.  This  test is used for clinical purposes.  It should not be regarded as  investigational or for research.  This laboratory is certified under the  Clinical Laboratory Improvement Amendments of 1988 (CLIA-88) as  qualified to perform high complexity clinical laboratory testing.      CONVERTED (HISTORICAL) FINAL PATHOLOGIST       Diagnostician:  BESSIE ESTES M.D.  Pathologist  Electronically Signed 12/13/2011      Diagnosis Code   DIAGNOSIS CODE:  4      Results for orders placed or performed in visit on 11/25/03   Converted Surgical Pathology   Result Value Ref Range    Spec Descr 1 SPECIMEN(S): A GASTRIC BIOPSY     Gross Description         GROSS DESCRIPTION:  The specimen is labeled \"gastric mucosa\" and consists " "of two tan  fragments measuring 0.8 x 0.2 x 0.3 cm together.  Totally submitted.      Final Diagnosis         FINAL DIAGNOSIS:  GASTRIC MUCOSA, BIOPSY:            CHRONIC GASTRITIS SHOWING MINIMAL ACTIVITY.            A SPECIAL STAIN FOR HELICOBACTER PYLORI IS NEGATIVE.  pgl    DIAGNOSIS CODE:  6      Comment   CLINICAL DIAGNOSIS:  Gastric mucosa biopsy       CONVERTED (HISTORICAL) FINAL PATHOLOGIST       Diagnostician:  JOY ALVARADO M.D.  Pathologist  Electronically Signed 11/28/2003     Results for orders placed or performed in visit on 04/01/03   Converted Surgical Pathology   Result Value Ref Range    Spec Descr 1 SPECIMEN(S): A MUCOSAL COLON BIOPSY     Gross Description         GROSS DESCRIPTION:  The container is labeled \"mucosa, colon (cold biopsy)\" and has nodular  bits of white soft tissue 0.6 cc in aggregate.  The entire specimen is  embedded.      Final Diagnosis         FINAL DIAGNOSIS:  MUCOSA, COLON:              FRAGMENTS OF MUCOSA OF THE COLON WITH A FEW BENIGN                   LYMPHOID NODULES.  [facility]    DIAGNOSIS CODE:  2      Comment   CLINICAL DIAGNOSIS:  Diarrhea       CONVERTED (HISTORICAL) FINAL PATHOLOGIST       Diagnostician:  ALVARO HEREDIA M.D.  Pathologist  Electronically Signed 04/02/2003         Some portions of this note have been dictated using voice recognition software and may contain errors and/or omissions.   "

## 2018-03-06 ENCOUNTER — OFFICE VISIT (OUTPATIENT)
Dept: GASTROENTEROLOGY | Facility: CLINIC | Age: 58
End: 2018-03-06

## 2018-03-06 VITALS
HEART RATE: 84 BPM | WEIGHT: 143 LBS | HEIGHT: 67 IN | BODY MASS INDEX: 22.44 KG/M2 | SYSTOLIC BLOOD PRESSURE: 112 MMHG | DIASTOLIC BLOOD PRESSURE: 62 MMHG

## 2018-03-06 DIAGNOSIS — K21.00 GASTROESOPHAGEAL REFLUX DISEASE WITH ESOPHAGITIS: ICD-10-CM

## 2018-03-06 DIAGNOSIS — R10.84 GENERALIZED ABDOMINAL PAIN: ICD-10-CM

## 2018-03-06 DIAGNOSIS — R19.7 DIARRHEA, UNSPECIFIED TYPE: Primary | ICD-10-CM

## 2018-03-06 PROCEDURE — 99213 OFFICE O/P EST LOW 20 MIN: CPT | Performed by: PHYSICIAN ASSISTANT

## 2018-03-06 RX ORDER — DICYCLOMINE HYDROCHLORIDE 10 MG/1
10 CAPSULE ORAL
Qty: 120 CAPSULE | Refills: 2 | Status: SHIPPED | OUTPATIENT
Start: 2018-03-06 | End: 2018-05-01

## 2018-03-06 NOTE — PROGRESS NOTES
Chief Complaint   Patient presents with   • Diarrhea   • Abdominal Pain   • Nausea   • Heartburn       ENDO PROCEDURE ORDERED:    Subjective    Sravanthi Ellison is a 57 y.o. female. she is here today for follow-up.    History of Present Illness    Patient is seen on a recheck of her GERD, abdominal pain, nausea, diarrhea. Last seen on 01/23/2018. Patient was given Levbid, but she felt it increased her diarrhea, so she quit taking it. She states she still feels very sick after she eats. She will have nausea, bloating. She will feel very sick to her stomach, will have an urgent bowel movement. Stools are loose to watery. She is very concerned about this. She agrees it may be some of her medications contributing to this. She complains of 3 out of 10 mid to upper abdominal pain. She thinks the Prilosec is doing well for her heartburn. She denied nausea, vomiting, dysphagia. She has seen no blood in her stool. Weight is down 6 pounds since last visit. Last EGD/colonoscopy on 12/06/2017 showed gastritis, hemorrhoids.     ASSESSMENT/PLAN: GERD appears stable on Prilosec. Probable IBS. I suggested a trial on Bentyl 10 mg before each meal. Will plan followup in 6-8 weeks, further pending clinical course and the results of the above. We discussed the risks, benefits, alternatives to further evaluation and treatment, the potential that her medications are contributing to her diarrhea and abdominal pain. She is agreeable to proceed with the above, would consider further testing depending on the results of the above. Patient was agreeable.       The following portions of the patient's history were reviewed and updated as appropriate:   Past Medical History:   Diagnosis Date   • Diabetes mellitus    • Diarrhea    • Diverticular disease of colon    • Gastroesophageal reflux disease    • Hiatal hernia    • Irritable bowel syndrome    • Nausea    • Right upper quadrant pain    • Traumatic brain injury 06/2017     Past Surgical  History:   Procedure Laterality Date   • CHOLECYSTECTOMY     • COLONOSCOPY  12/12/2011    Mild diverticulosis in sigmoid colon. Stool collected for study.   • COLONOSCOPY N/A 12/6/2017    Procedure: COLONOSCOPY--look TI, bx;  Surgeon: Marshal Staley MD;  Location: [facility] ENDOSCOPY;  Service:    • ENDOSCOPY N/A 12/6/2017    Procedure: ESOPHAGOGASTRODUODENOSCOPY--small bowel biopsies;  Surgeon: Marshal Staley MD;  Location: [facility] ENDOSCOPY;  Service:    • ENDOSCOPY W/ PEG TUBE PLACEMENT  12/12/2011    Normal hypopharynx, esophagus, symmetrical & patent pylorus. Hiatus hernia in GE junction. Non-erosive gastritis in stomach. Multiple biopsies taken. Normal duodenum. Multiple biopsies taken.   • UPPER GASTROINTESTINAL ENDOSCOPY  12/12/2011   • UPPER GASTROINTESTINAL ENDOSCOPY  12/06/2017     Family History   Problem Relation Age of Onset   • Breast cancer Other    • Diabetes Other    • Heart disease Other    • Hypertension Other    • Skin cancer Other      OB History     No data available        No Known Allergies  Social History     Social History   • Marital status:      Social History Main Topics   • Smoking status: Never Smoker   • Smokeless tobacco: Never Used   • Alcohol use No   • Drug use: No   • Sexual activity: Defer       Current Outpatient Prescriptions:   •  Bromocriptine Mesylate (CYCLOSET) 0.8 MG tablet, Take 0.8 mg by mouth Daily. 6 TABLETS DAILY, Disp: , Rfl:   •  divalproex (DEPAKOTE) 500 MG DR tablet, Take 500 mg by mouth 2 (Two) Times a Day., Disp: , Rfl:   •  DULoxetine (CYMBALTA) 30 MG capsule, take 1 tablet by mouth every morning for anxiety depression, Disp: , Rfl: 0  •  Empagliflozin (JARDIANCE) 25 MG tablet, Take 25 mg by mouth Daily., Disp: , Rfl:   •  fluticasone (FLONASE) 50 MCG/ACT nasal spray, 2 sprays into each nostril Daily., Disp: 16 g, Rfl: 11  •  Liraglutide (VICTOZA) 18 MG/3ML solution pen-injector injection, Inject 1.8 mg under the skin Daily., Disp: , Rfl:   •  MELATONIN  "PO, Take 3 mg by mouth Every Night., Disp: , Rfl:   •  meloxicam (MOBIC) 15 MG tablet, Take 15 mg by mouth Daily., Disp: , Rfl:   •  metFORMIN (GLUCOPHAGE) 1000 MG tablet, Take 1,000 mg by mouth 2 (Two) Times a Day With Meals., Disp: , Rfl:   •  omeprazole (priLOSEC) 40 MG capsule, Take 40 mg by mouth Daily., Disp: , Rfl: 0  •  pioglitazone (ACTOS) 30 MG tablet, Take 30 mg by mouth Daily., Disp: , Rfl:   •  pravastatin (PRAVACHOL) 20 MG tablet, Take 20 mg by mouth Daily., Disp: , Rfl:   •  propranolol (INDERAL) 40 MG tablet, Take 40 mg by mouth 2 (Two) Times a Day., Disp: , Rfl:   •  dicyclomine (BENTYL) 10 MG capsule, Take 1 capsule by mouth 4 (Four) Times a Day Before Meals & at Bedtime., Disp: 120 capsule, Rfl: 2  Review of Systems  Review of Systems       Objective    /62 (BP Location: Left arm)  Pulse 84  Ht 170.2 cm (67.01\")  Wt 64.9 kg (143 lb)  BMI 22.39 kg/m2  Physical Exam   Constitutional: She is oriented to person, place, and time. She appears well-developed and well-nourished. No distress.   HENT:   Head: Normocephalic and atraumatic.   Eyes: EOM are normal. Pupils are equal, round, and reactive to light.   Neck: Normal range of motion.   Cardiovascular: Normal rate, regular rhythm and normal heart sounds.    Pulmonary/Chest: Effort normal and breath sounds normal.   Abdominal: Soft. Bowel sounds are normal. She exhibits distension. She exhibits no shifting dullness, no abdominal bruit, no ascites and no mass. There is no hepatosplenomegaly. There is tenderness. There is no rigidity, no rebound, no guarding and no CVA tenderness. No hernia. Hernia confirmed negative in the ventral area.   Mild diffuse, scar   Musculoskeletal: Normal range of motion.   Neurological: She is alert and oriented to person, place, and time.   Skin: Skin is warm and dry.   Psychiatric: She has a normal mood and affect. Her behavior is normal. Judgment and thought content normal.   Nursing note and vitals " reviewed.    Assessment/Plan      1. Diarrhea, unspecified type    2. Generalized abdominal pain    3. Gastroesophageal reflux disease with esophagitis    .   Sravanthi was seen today for diarrhea, abdominal pain, nausea and heartburn.    Diagnoses and all orders for this visit:    Diarrhea, unspecified type    Generalized abdominal pain    Gastroesophageal reflux disease with esophagitis    Other orders  -     dicyclomine (BENTYL) 10 MG capsule; Take 1 capsule by mouth 4 (Four) Times a Day Before Meals & at Bedtime.        Orders placed during this encounter include:  No orders of the defined types were placed in this encounter.      Medications prescribed:  New Medications Ordered This Visit   Medications   • dicyclomine (BENTYL) 10 MG capsule     Sig: Take 1 capsule by mouth 4 (Four) Times a Day Before Meals & at Bedtime.     Dispense:  120 capsule     Refill:  2     Discontinued Medications       Reason for Discontinue    hyoscyamine (LEVBID) 0.375 MG 12 hr tablet Side effects        Requested Prescriptions     Signed Prescriptions Disp Refills   • dicyclomine (BENTYL) 10 MG capsule 120 capsule 2     Sig: Take 1 capsule by mouth 4 (Four) Times a Day Before Meals & at Bedtime.       Review and/or summary of lab tests, radiology, procedures, medications. Review and summary of old records and obtaining of history. The risks and benefits of my recommendations, as well as other treatment options were discussed with the patient today. Questions were answered.    Follow-up: Return in about 7 weeks (around 4/24/2018), or if symptoms worsen or fail to improve.     * Surgery not found *      This document has been electronically signed by Abel Laboy PA-C on March 8, 2018 4:32 PM      Results for orders placed or performed during the hospital encounter of 12/06/17   Tissue Pathology Exam - Tissue, Small Intestine, Duodenum   Result Value Ref Range    Case Report       Surgical Pathology Report                          Case: [id number]                                  Authorizing Provider:  Marshal Staley MD         Collected:           12/06/2017 10:50 AM          Ordering Location:     [facility]             Received:            12/06/2017 12:01 PM                                 [facility]                                                     Pathologist:           Jared Kelley MD                                                          Specimens:   1) - Small Intestine, Duodenum, small bowel bx                                                      2) - Gastric, Antrum, antrum bx                                                                     3) - Small Intestine, Ileum, ti bx                                                                  4) - Large Intestine, colonic mucosa bx                                                    Final Diagnosis       1.  MUCOSA, DUODENUM:  NO SIGNIFICANT HISTOLOGIC ABNORMALITY.    2.  MUCOSA, ANTRUM OF STOMACH:  CHRONIC GASTRITIS WITH BENIGN LYMPHOID AGGREGATES AND CONGESTION.  NEGATIVE FOR HELICOBACTER PYLORI (HP IMMUNOSTAIN).    3.  MUCOSA, TERMINAL ILEUM:  NO SIGNIFICANT HISTOLOGIC ABNORMALITY.    4.  MUCOSA, COLON:  NO SIGNIFICANT HISTOLOGIC ABNORMALITY.      Comment       Helicobacter pylori (HP) immunostain is performed because an appropriate inflammatory milieu is present and organisms are not seen on H & E stained slides.    HP immunostain was developed and its performance characteristics determined by [facility].  It has not been cleared or approved by the U.S. Food and Drug Administration.  The FDA has determined that such clearance or approval is not necessary.  This test is used for clinical purposes.  It should not be regarded as investigational or for research.  This laboratory is certified under the Clinical Laboratory Improvement Amendments of 1988 (CLIA-88) as qualified to perform high complexity clinical laboratory  testing.            Gross Description       Received for examination are 4 containers, each of which have nodular bits of white soft tissue measuring 0.3-0.5 cc in aggregate.  All specimens are embedded as labeled.  1A duodenum; 2A antrum of stomach; 3A terminal ileum; 4A mucosa of colon.      Embedded Images     POC Glucose Fingerstick   Result Value Ref Range    Glucose 81 70 - 130 mg/dL   Results for orders placed or performed in visit on 11/28/17   Gastrointestinal Panel, PCR - Stool, Per Rectum   Result Value Ref Range    Campylobacter Not Detected Not Detected    Clostridium difficile (toxin A/B) Not Detected Not Detected, NA formed stool, C diff not applicable on patient less than one year of age    Plesiomonas shigelloides Not Detected Not Detected    Salmonella Not Detected Not Detected    Vibrio Not Detected Not Detected    Vibrio cholerae Not Detected Not Detected    Yersinia enterocolitica Not Detected Not Detected    Enteroaggregative E. coli (EAEC) Not Detected Not Detected    Enteropathogenic E. coli (EPEC) Not Detected Not Detected    Enterotoxigenic E. coli (ETEC) lt/st Not Detected Not Detected    Shiga-like toxin-producing E. coli (STEC) stx1/stx2 Not Detected Not Detected    E. coli O157 Not Detected Not Detected    Shigella/Enteroinvasive E. coli (EIEC) Not Detected Not Detected    Cryptosporidium Not Detected Not Detected    Cyclospora cayetanensis Not Detected Not Detected    Entamoeba histolytica Not Detected Not Detected    Giardia lamblia Not Detected Not Detected    Adenovirus F40/41 Not Detected Not Detected    Astrovirus Not Detected Not Detected    Norovirus GI/GII Not Detected Not Detected    Rotavirus A Not Detected Not Detected    Sapovirus (I, II, IV or V) Not Detected Not Detected   Glia(IgA / G) & TTG(IgA / G)   Result Value Ref Range    Gliadin Deamidated Peptide Ab, IgA 5 0 - 19 units    Deaminated Gliadin Ab IgG 4 0 - 19 units    Tissue Transglutaminase IgA <2 0 - 3 U/mL     Tissue Transglutaminase IgG <2 0 - 5 U/mL   Allergens (12) Foods   Result Value Ref Range    Class Description Comment     Egg White <0.10 Class 0 kU/L    Milk, Cow's <0.10 Class 0 kU/L    CodFish <0.10 Class 0 kU/L    Sesame Seed <0.10 Class 0 kU/L    Peanut <0.10 Class 0 kU/L    Soybean <0.10 Class 0 kU/L    Hazelnut <0.10 Class 0 kU/L    Shrimp <0.10 Class 0 kU/L    Scallop <0.10 Class 0 kU/L    Gluten <0.10 Class 0 kU/L    [place] <0.10 Class 0 kU/L    Wheat <0.10 Class 0 kU/L   Trypsin   Result Value Ref Range    Trypsin 471 169 - 773 ng/mL   Lipase   Result Value Ref Range    Lipase 104 23 - 300 U/L   Amylase   Result Value Ref Range    Amylase 60 25 - 140 U/L   Results for orders placed or performed in visit on 11/07/16    COLONOSCOPY   Result Value Ref Range     Colonoscopy completed; ordered by Dr. Ijeoma Joseph    Results for orders placed or performed in visit on 12/12/11   Converted Surgical Pathology   Result Value Ref Range    Spec Descr 1 SPECIMEN(S): A SMALL BOWEL BIOPSY     Specimen description 2 SPECIMEN(S): B GASTRIC BIOPSY     Specimen description 3 SPECIMEN(S): C TERMINAL ILEUM BIOPSY     Specimen description 4 SPECIMEN(S): D MUCOSAL COLON BIOPSY     Preoperative Diagnosis   PREOPERATIVE DIAGNOSIS:  Diarrhea       Postoperative Diagnosis         POSTOPERATIVE DIAGNOSIS:  Small bowel (A), gastric mucosa (B), terminal ileum (C), colonic mucosa  (D)      Gross Description         GROSS DESCRIPTION:  In 4 containers, each of these show mucosal biopsies measuring up to 0.3  cm in greatest dimension.  Embedded accordingly.  (A) small bowel, (B)  gastric mucosa, (C) terminal ileum, (D) colonic mucosa.      Final Diagnosis         FINAL DIAGNOSIS:  A.  SMALL BOWEL, MUCOSAL BIOPSY:             NO SIGNIFICANT PATHOLOGIC DIAGNOSIS.  B.  GASTRIC MUCOSAL BIOPSY:             MILD CHRONIC INFLAMMATION.             NO EVIDENCE OF HELICOBACTER PYLORI (IP STAIN PERFORMED).  C.  TERMINAL ILEUM, MUCOSAL BIOPSY:    "          NO SIGNIFICANT PATHOLOGIC DIAGNOSIS.  D.  COLONIC MUCOSAL BIOPSY, RANDOM:             NO SIGNIFICANT PATHOLOGIC DIAGNOSIS.      Comment         COMMENT:  Helicobacter immunoperoxidase stain performed on block B.  HP immunostain was developed and its performance characteristics  determined by [facility].  It has not been  cleared or approved by the U.S. Food and Drug Administration.  The FDA  has determined that such clearance or approval is not necessary.  This  test is used for clinical purposes.  It should not be regarded as  investigational or for research.  This laboratory is certified under the  Clinical Laboratory Improvement Amendments of 1988 (CLIA-88) as  qualified to perform high complexity clinical laboratory testing.      CONVERTED (HISTORICAL) FINAL PATHOLOGIST       Diagnostician:  BESSIE ESTES M.D.  Pathologist  Electronically Signed 12/13/2011      Diagnosis Code   DIAGNOSIS CODE:  4      Results for orders placed or performed in visit on 11/25/03   Converted Surgical Pathology   Result Value Ref Range    Spec Descr 1 SPECIMEN(S): A GASTRIC BIOPSY     Gross Description         GROSS DESCRIPTION:  The specimen is labeled \"gastric mucosa\" and consists of two tan  fragments measuring 0.8 x 0.2 x 0.3 cm together.  Totally submitted.      Final Diagnosis         FINAL DIAGNOSIS:  GASTRIC MUCOSA, BIOPSY:            CHRONIC GASTRITIS SHOWING MINIMAL ACTIVITY.            A SPECIAL STAIN FOR HELICOBACTER PYLORI IS NEGATIVE.  pgl    DIAGNOSIS CODE:  6      Comment   CLINICAL DIAGNOSIS:  Gastric mucosa biopsy       CONVERTED (HISTORICAL) FINAL PATHOLOGIST       Diagnostician:  JOY ALVARADO M.D.  Pathologist  Electronically Signed 11/28/2003     Results for orders placed or performed in visit on 04/01/03   Converted Surgical Pathology   Result Value Ref Range    Spec Descr 1 SPECIMEN(S): A MUCOSAL COLON BIOPSY     Gross Description         GROSS DESCRIPTION:  The " "container is labeled \"mucosa, colon (cold biopsy)\" and has nodular  bits of white soft tissue 0.6 cc in aggregate.  The entire specimen is  embedded.      Final Diagnosis         FINAL DIAGNOSIS:  MUCOSA, COLON:              FRAGMENTS OF MUCOSA OF THE COLON WITH A FEW BENIGN                   LYMPHOID NODULES.  [facility] [place]    DIAGNOSIS CODE:  2      Comment   CLINICAL DIAGNOSIS:  Diarrhea       CONVERTED (HISTORICAL) FINAL PATHOLOGIST       Diagnostician:  ALVARO HEREDIA M.D.  Pathologist  Electronically Signed 04/02/2003         Some portions of this note have been dictated using voice recognition software and may contain errors and/or omissions.   "

## 2018-05-01 ENCOUNTER — OFFICE VISIT (OUTPATIENT)
Dept: GASTROENTEROLOGY | Facility: CLINIC | Age: 58
End: 2018-05-01

## 2018-05-01 VITALS
HEIGHT: 67 IN | HEART RATE: 80 BPM | DIASTOLIC BLOOD PRESSURE: 68 MMHG | BODY MASS INDEX: 22.76 KG/M2 | WEIGHT: 145 LBS | SYSTOLIC BLOOD PRESSURE: 110 MMHG

## 2018-05-01 DIAGNOSIS — R10.84 GENERALIZED ABDOMINAL PAIN: ICD-10-CM

## 2018-05-01 DIAGNOSIS — R19.7 DIARRHEA, UNSPECIFIED TYPE: ICD-10-CM

## 2018-05-01 DIAGNOSIS — K21.00 GASTROESOPHAGEAL REFLUX DISEASE WITH ESOPHAGITIS: ICD-10-CM

## 2018-05-01 DIAGNOSIS — R11.0 NAUSEA: Primary | ICD-10-CM

## 2018-05-01 PROCEDURE — 99213 OFFICE O/P EST LOW 20 MIN: CPT | Performed by: PHYSICIAN ASSISTANT

## 2018-05-01 PROCEDURE — 86003 ALLG SPEC IGE CRUDE XTRC EA: CPT | Performed by: PHYSICIAN ASSISTANT

## 2018-05-01 PROCEDURE — 86008 ALLG SPEC IGE RECOMB EA: CPT | Performed by: PHYSICIAN ASSISTANT

## 2018-05-01 PROCEDURE — 36415 COLL VENOUS BLD VENIPUNCTURE: CPT | Performed by: INTERNAL MEDICINE

## 2018-05-01 RX ORDER — BUSPIRONE HYDROCHLORIDE 7.5 MG/1
7.5 TABLET ORAL 2 TIMES DAILY
Refills: 0 | COMMUNITY
Start: 2018-03-29

## 2018-05-01 RX ORDER — DICYCLOMINE HCL 20 MG
20 TABLET ORAL 2 TIMES DAILY
Qty: 60 TABLET | Refills: 3 | Status: SHIPPED | OUTPATIENT
Start: 2018-05-01 | End: 2018-06-26

## 2018-05-01 NOTE — PROGRESS NOTES
Chief Complaint   Patient presents with   • Diarrhea   • Abdominal Pain   • Heartburn       ENDO PROCEDURE ORDERED:    Subjective    Sravanthi Ellison is a 58 y.o. female. she is here today for follow-up.    History of Present Illness    The patient is seen on a recheck of her GERD, abdominal pain, diarrhea. Last seen 03/06/2018.  The patient was started on Bentyl before meals. She states it has helped a little bit.  She is still having loose to watery stools, still having bouts of incontinence.  She has nausea whenever she eats.  Her pain is 4/10. GERD is doing reasonable well on the Prilosec 40 mg daily. Weight is up 2 pounds since last visit.  Her last EGD/colonoscopy showed gastritis and hemorrhoids on 12/06/2017.  The patient is concerned she could have alpha-gal causing her symptoms. She has had some neighbors that have had this. She did have previous negative recurrent GI distress panel testing in November. She recently had a urinalysis with Ijeoma Joseph on 04/25/2018, showed glucose and ketones, otherwise no recent studies.     A/P: The patient with fecal incontinence, with some functional overlay. I agree we should test her for alpha-gal. We will try increasing her Bentyl to 20 mg b.i.d., she has seen some improvement. Will consider other medications, but she does seem to be somewhat better.  We will continue on the Prilosec for now.  She was encouraged to avoid gastric irritants. We will plan followup in 4-6 weeks, further pending clinical course and the results of the above.        The following portions of the patient's history were reviewed and updated as appropriate:   Past Medical History:   Diagnosis Date   • Diabetes mellitus    • Diarrhea    • Diverticular disease of colon    • Gastroesophageal reflux disease    • Hiatal hernia    • Irritable bowel syndrome    • Nausea    • Right upper quadrant pain    • Traumatic brain injury 06/2017     Past Surgical History:   Procedure Laterality Date   •  CHOLECYSTECTOMY     • COLONOSCOPY  12/12/2011    Mild diverticulosis in sigmoid colon. Stool collected for study.   • COLONOSCOPY N/A 12/6/2017    Procedure: COLONOSCOPY--look TI, bx;  Surgeon: Marshal Staley MD;  Location: [facility] ENDOSCOPY;  Service:    • ENDOSCOPY N/A 12/6/2017    Procedure: ESOPHAGOGASTRODUODENOSCOPY--small bowel biopsies;  Surgeon: Marshal Staley MD;  Location: [facility] ENDOSCOPY;  Service:    • ENDOSCOPY W/ PEG TUBE PLACEMENT  12/12/2011    Normal hypopharynx, esophagus, symmetrical & patent pylorus. Hiatus hernia in GE junction. Non-erosive gastritis in stomach. Multiple biopsies taken. Normal duodenum. Multiple biopsies taken.   • UPPER GASTROINTESTINAL ENDOSCOPY  12/12/2011   • UPPER GASTROINTESTINAL ENDOSCOPY  12/06/2017     Family History   Problem Relation Age of Onset   • Breast cancer Other    • Diabetes Other    • Heart disease Other    • Hypertension Other    • Skin cancer Other      OB History     No data available        No Known Allergies  Social History     Social History   • Marital status:      Social History Main Topics   • Smoking status: Never Smoker   • Smokeless tobacco: Never Used   • Alcohol use No   • Drug use: No   • Sexual activity: Defer     Other Topics Concern   • Not on file       Current Outpatient Prescriptions:   •  Bromocriptine Mesylate (CYCLOSET) 0.8 MG tablet, Take 0.8 mg by mouth Daily. 6 TABLETS DAILY, Disp: , Rfl:   •  busPIRone (BUSPAR) 7.5 MG tablet, Take 7.5 mg by mouth Every Night., Disp: , Rfl: 0  •  divalproex (DEPAKOTE) 500 MG DR tablet, Take 500 mg by mouth 2 (Two) Times a Day., Disp: , Rfl:   •  DULoxetine (CYMBALTA) 30 MG capsule, take 1 tablet by mouth every morning for anxiety depression, Disp: , Rfl: 0  •  Empagliflozin (JARDIANCE) 25 MG tablet, Take 25 mg by mouth Daily., Disp: , Rfl:   •  fluticasone (FLONASE) 50 MCG/ACT nasal spray, 2 sprays into each nostril Daily., Disp: 16 g, Rfl: 11  •  MELATONIN PO, Take 3 mg by mouth Every  "Night., Disp: , Rfl:   •  meloxicam (MOBIC) 15 MG tablet, Take 15 mg by mouth Daily., Disp: , Rfl:   •  metFORMIN (GLUCOPHAGE) 1000 MG tablet, Take 1,000 mg by mouth 2 (Two) Times a Day With Meals., Disp: , Rfl:   •  omeprazole (priLOSEC) 40 MG capsule, Take 40 mg by mouth Daily., Disp: , Rfl: 0  •  pioglitazone (ACTOS) 30 MG tablet, Take 30 mg by mouth Daily., Disp: , Rfl:   •  pravastatin (PRAVACHOL) 20 MG tablet, Take 20 mg by mouth Daily., Disp: , Rfl:   •  propranolol (INDERAL) 40 MG tablet, Take 40 mg by mouth 2 (Two) Times a Day., Disp: , Rfl:   •  dicyclomine (BENTYL) 20 MG tablet, Take 1 tablet by mouth 2 (Two) Times a Day., Disp: 60 tablet, Rfl: 3  Review of Systems  Review of Systems       Objective    /68 (BP Location: Left arm)   Pulse 80   Ht 170.2 cm (67\")   Wt 65.8 kg (145 lb)   BMI 22.71 kg/m²   Physical Exam   Constitutional: She is oriented to person, place, and time. She appears well-developed and well-nourished. No distress.   HENT:   Head: Normocephalic and atraumatic.   Eyes: EOM are normal. Pupils are equal, round, and reactive to light.   Neck: Normal range of motion.   Cardiovascular: Normal rate, regular rhythm and normal heart sounds.    Pulmonary/Chest: Effort normal and breath sounds normal.   Abdominal: Soft. Bowel sounds are normal. She exhibits distension. She exhibits no shifting dullness, no abdominal bruit, no ascites and no mass. There is no hepatosplenomegaly. There is tenderness. There is no rigidity, no rebound, no guarding and no CVA tenderness. No hernia. Hernia confirmed negative in the ventral area.   Mild diffuse, scar   Musculoskeletal: Normal range of motion.   Neurological: She is alert and oriented to person, place, and time.   Skin: Skin is warm and dry.   Psychiatric: She has a normal mood and affect. Her behavior is normal. Judgment and thought content normal.   Nursing note and vitals reviewed.    Assessment/Plan      1. Nausea    2. Gastroesophageal " reflux disease with esophagitis    3. Generalized abdominal pain    4. Diarrhea, unspecified type    .   Sravanthi was seen today for diarrhea, abdominal pain and heartburn.    Diagnoses and all orders for this visit:    Nausea  -     Alpha - Gal Panel    Gastroesophageal reflux disease with esophagitis  -     Alpha - Gal Panel    Generalized abdominal pain  -     Alpha - Gal Panel    Diarrhea, unspecified type  -     Alpha - Gal Panel    Other orders  -     dicyclomine (BENTYL) 20 MG tablet; Take 1 tablet by mouth 2 (Two) Times a Day.        Orders placed during this encounter include:  Orders Placed This Encounter   Procedures   • Alpha - Gal Panel       Medications prescribed:  New Medications Ordered This Visit   Medications   • dicyclomine (BENTYL) 20 MG tablet     Sig: Take 1 tablet by mouth 2 (Two) Times a Day.     Dispense:  60 tablet     Refill:  3     Discontinued Medications       Reason for Discontinue    Liraglutide (VICTOZA) 18 MG/3ML solution pen-injector injection Discontinued by another clinician        Requested Prescriptions     Signed Prescriptions Disp Refills   • dicyclomine (BENTYL) 20 MG tablet 60 tablet 3     Sig: Take 1 tablet by mouth 2 (Two) Times a Day.       Review and/or summary of lab tests, radiology, procedures, medications. Review and summary of old records and obtaining of history. The risks and benefits of my recommendations, as well as other treatment options were discussed with the patient today. Questions were answered.    Follow-up: Return in about 6 weeks (around 6/12/2018), or if symptoms worsen or fail to improve, for After the above.     * Surgery not found *      This document has been electronically signed by Abel Laboy PA-C on May 4, 2018 12:26 PM      Results for orders placed or performed during the hospital encounter of 12/06/17   Tissue Pathology Exam - Tissue, Small Intestine, Duodenum   Result Value Ref Range    Case Report       Surgical Pathology Report                          Case: [id number]                                  Authorizing Provider:  Marshal Staley MD         Collected:           12/06/2017 10:50 AM          Ordering Location:     [facility]             Received:            12/06/2017 12:01 PM                                 [facility]                                                     Pathologist:           Jared Kelley MD                                                          Specimens:   1) - Small Intestine, Duodenum, small bowel bx                                                      2) - Gastric, Antrum, antrum bx                                                                     3) - Small Intestine, Ileum, ti bx                                                                  4) - Large Intestine, colonic mucosa bx                                                    Final Diagnosis       1.  MUCOSA, DUODENUM:  NO SIGNIFICANT HISTOLOGIC ABNORMALITY.    2.  MUCOSA, ANTRUM OF STOMACH:  CHRONIC GASTRITIS WITH BENIGN LYMPHOID AGGREGATES AND CONGESTION.  NEGATIVE FOR HELICOBACTER PYLORI (HP IMMUNOSTAIN).    3.  MUCOSA, TERMINAL ILEUM:  NO SIGNIFICANT HISTOLOGIC ABNORMALITY.    4.  MUCOSA, COLON:  NO SIGNIFICANT HISTOLOGIC ABNORMALITY.      Comment       Helicobacter pylori (HP) immunostain is performed because an appropriate inflammatory milieu is present and organisms are not seen on H & E stained slides.    HP immunostain was developed and its performance characteristics determined by [facility].  It has not been cleared or approved by the U.S. Food and Drug Administration.  The FDA has determined that such clearance or approval is not necessary.  This test is used for clinical purposes.  It should not be regarded as investigational or for research.  This laboratory is certified under the Clinical Laboratory Improvement Amendments of 1988 (CLIA-88) as qualified to perform high complexity  clinical laboratory testing.            Gross Description       Received for examination are 4 containers, each of which have nodular bits of white soft tissue measuring 0.3-0.5 cc in aggregate.  All specimens are embedded as labeled.  1A duodenum; 2A antrum of stomach; 3A terminal ileum; 4A mucosa of colon.      Embedded Images     POC Glucose Fingerstick   Result Value Ref Range    Glucose 81 70 - 130 mg/dL   Results for orders placed or performed in visit on 11/28/17   Gastrointestinal Panel, PCR - Stool, Per Rectum   Result Value Ref Range    Campylobacter Not Detected Not Detected    Clostridium difficile (toxin A/B) Not Detected Not Detected, NA formed stool, C diff not applicable on patient less than one year of age    Plesiomonas shigelloides Not Detected Not Detected    Salmonella Not Detected Not Detected    Vibrio Not Detected Not Detected    Vibrio cholerae Not Detected Not Detected    Yersinia enterocolitica Not Detected Not Detected    Enteroaggregative E. coli (EAEC) Not Detected Not Detected    Enteropathogenic E. coli (EPEC) Not Detected Not Detected    Enterotoxigenic E. coli (ETEC) lt/st Not Detected Not Detected    Shiga-like toxin-producing E. coli (STEC) stx1/stx2 Not Detected Not Detected    E. coli O157 Not Detected Not Detected    Shigella/Enteroinvasive E. coli (EIEC) Not Detected Not Detected    Cryptosporidium Not Detected Not Detected    Cyclospora cayetanensis Not Detected Not Detected    Entamoeba histolytica Not Detected Not Detected    Giardia lamblia Not Detected Not Detected    Adenovirus F40/41 Not Detected Not Detected    Astrovirus Not Detected Not Detected    Norovirus GI/GII Not Detected Not Detected    Rotavirus A Not Detected Not Detected    Sapovirus (I, II, IV or V) Not Detected Not Detected   Glia(IgA / G) & TTG(IgA / G)   Result Value Ref Range    Gliadin Deamidated Peptide Ab, IgA 5 0 - 19 units    Deaminated Gliadin Ab IgG 4 0 - 19 units    Tissue Transglutaminase IgA  <2 0 - 3 U/mL    Tissue Transglutaminase IgG <2 0 - 5 U/mL   Allergens (12) Foods   Result Value Ref Range    Class Description Comment     Egg White <0.10 Class 0 kU/L    Milk, Cow's <0.10 Class 0 kU/L    CodFish <0.10 Class 0 kU/L    Sesame Seed <0.10 Class 0 kU/L    Peanut <0.10 Class 0 kU/L    Soybean <0.10 Class 0 kU/L    Hazelnut <0.10 Class 0 kU/L    Shrimp <0.10 Class 0 kU/L    Scallop <0.10 Class 0 kU/L    Gluten <0.10 Class 0 kU/L    Pickens <0.10 Class 0 kU/L    Wheat <0.10 Class 0 kU/L   Trypsin   Result Value Ref Range    Trypsin 471 169 - 773 ng/mL   Lipase   Result Value Ref Range    Lipase 104 23 - 300 U/L   Amylase   Result Value Ref Range    Amylase 60 25 - 140 U/L   Results for orders placed or performed in visit on 11/07/16    COLONOSCOPY   Result Value Ref Range     Colonoscopy completed; ordered by Dr. Ijeoma Joseph    Results for orders placed or performed in visit on 12/12/11   Converted Surgical Pathology   Result Value Ref Range    Spec Descr 1 SPECIMEN(S): A SMALL BOWEL BIOPSY     Specimen description 2 SPECIMEN(S): B GASTRIC BIOPSY     Specimen description 3 SPECIMEN(S): C TERMINAL ILEUM BIOPSY     Specimen description 4 SPECIMEN(S): D MUCOSAL COLON BIOPSY     Preoperative Diagnosis   PREOPERATIVE DIAGNOSIS:  Diarrhea       Postoperative Diagnosis         POSTOPERATIVE DIAGNOSIS:  Small bowel (A), gastric mucosa (B), terminal ileum (C), colonic mucosa  (D)      Gross Description         GROSS DESCRIPTION:  In 4 containers, each of these show mucosal biopsies measuring up to 0.3  cm in greatest dimension.  Embedded accordingly.  (A) small bowel, (B)  gastric mucosa, (C) terminal ileum, (D) colonic mucosa.      Final Diagnosis         FINAL DIAGNOSIS:  A.  SMALL BOWEL, MUCOSAL BIOPSY:             NO SIGNIFICANT PATHOLOGIC DIAGNOSIS.  B.  GASTRIC MUCOSAL BIOPSY:             MILD CHRONIC INFLAMMATION.             NO EVIDENCE OF HELICOBACTER PYLORI (IP STAIN PERFORMED).  C.  TERMINAL ILEUM,  "MUCOSAL BIOPSY:             NO SIGNIFICANT PATHOLOGIC DIAGNOSIS.  D.  COLONIC MUCOSAL BIOPSY, RANDOM:             NO SIGNIFICANT PATHOLOGIC DIAGNOSIS.      Comment         COMMENT:  Helicobacter immunoperoxidase stain performed on block B.  HP immunostain was developed and its performance characteristics  determined by [facility].  It has not been  cleared or approved by the U.S. Food and Drug Administration.  The FDA  has determined that such clearance or approval is not necessary.  This  test is used for clinical purposes.  It should not be regarded as  investigational or for research.  This laboratory is certified under the  Clinical Laboratory Improvement Amendments of 1988 (CLIA-88) as  qualified to perform high complexity clinical laboratory testing.      CONVERTED (HISTORICAL) FINAL PATHOLOGIST       Diagnostician:  BESSIE ESTES M.D.  Pathologist  Electronically Signed 12/13/2011      Diagnosis Code   DIAGNOSIS CODE:  4      Results for orders placed or performed in visit on 11/25/03   Converted Surgical Pathology   Result Value Ref Range    Spec Descr 1 SPECIMEN(S): A GASTRIC BIOPSY     Gross Description         GROSS DESCRIPTION:  The specimen is labeled \"gastric mucosa\" and consists of two tan  fragments measuring 0.8 x 0.2 x 0.3 cm together.  Totally submitted.      Final Diagnosis         FINAL DIAGNOSIS:  GASTRIC MUCOSA, BIOPSY:            CHRONIC GASTRITIS SHOWING MINIMAL ACTIVITY.            A SPECIAL STAIN FOR HELICOBACTER PYLORI IS NEGATIVE.  pgl    DIAGNOSIS CODE:  6      Comment   CLINICAL DIAGNOSIS:  Gastric mucosa biopsy       CONVERTED (HISTORICAL) FINAL PATHOLOGIST       Diagnostician:  JOY ALVARADO M.D.  Pathologist  Electronically Signed 11/28/2003     Results for orders placed or performed in visit on 04/01/03   Converted Surgical Pathology   Result Value Ref Range    Spec Descr 1 SPECIMEN(S): A MUCOSAL COLON BIOPSY     Gross Description         GROSS " "DESCRIPTION:  The container is labeled \"mucosa, colon (cold biopsy)\" and has nodular  bits of white soft tissue 0.6 cc in aggregate.  The entire specimen is  embedded.      Final Diagnosis         FINAL DIAGNOSIS:  MUCOSA, COLON:              FRAGMENTS OF MUCOSA OF THE COLON WITH A FEW BENIGN                   LYMPHOID NODULES.  [facility]    DIAGNOSIS CODE:  2      Comment   CLINICAL DIAGNOSIS:  Diarrhea       CONVERTED (HISTORICAL) FINAL PATHOLOGIST       Diagnostician:  ALVARO HEREDIA M.D.  Pathologist  Electronically Signed 04/02/2003         Some portions of this note have been dictated using voice recognition software and may contain errors and/or omissions.   "

## 2018-05-07 LAB
ALPHA GAL IGE: 1.34 KU/L
BEEF IGE QN: 0.16 KU/L
LAMB IGE QN: <0.1 KU/L
Lab: 0
Lab: 0
Lab: ABNORMAL
PORK IGE: <0.1 KU/L

## 2018-06-26 ENCOUNTER — OFFICE VISIT (OUTPATIENT)
Dept: GASTROENTEROLOGY | Facility: CLINIC | Age: 58
End: 2018-06-26

## 2018-06-26 VITALS
WEIGHT: 133 LBS | SYSTOLIC BLOOD PRESSURE: 104 MMHG | HEART RATE: 73 BPM | HEIGHT: 66 IN | DIASTOLIC BLOOD PRESSURE: 62 MMHG | BODY MASS INDEX: 21.38 KG/M2

## 2018-06-26 DIAGNOSIS — R19.7 DIARRHEA, UNSPECIFIED TYPE: ICD-10-CM

## 2018-06-26 DIAGNOSIS — K21.00 GASTROESOPHAGEAL REFLUX DISEASE WITH ESOPHAGITIS: ICD-10-CM

## 2018-06-26 DIAGNOSIS — R10.84 GENERALIZED ABDOMINAL PAIN: ICD-10-CM

## 2018-06-26 DIAGNOSIS — R11.0 NAUSEA: Primary | ICD-10-CM

## 2018-06-26 PROCEDURE — 99213 OFFICE O/P EST LOW 20 MIN: CPT | Performed by: PHYSICIAN ASSISTANT

## 2018-06-26 RX ORDER — HYOSCYAMINE SULFATE EXTENDED-RELEASE 0.38 MG/1
0.38 TABLET ORAL 2 TIMES DAILY
Qty: 60 TABLET | Refills: 3 | Status: SHIPPED | OUTPATIENT
Start: 2018-06-26 | End: 2021-12-07

## 2018-06-26 NOTE — PROGRESS NOTES
Chief Complaint   Patient presents with   • Nausea   • Heartburn   • Diarrhea       ENDO PROCEDURE ORDERED:    Subjective    Sravanthi Ellison is a 58 y.o. female. she is here today for follow-up.    History of Present Illness    HISTORY: Patient is seen on a recheck of her GERD, nausea, and diarrhea. Last seen 05/01/2018. At that time I tried increasing her Bentyl. She states it has not significantly helped. She is still having frequent loose stools that are somewhat urgent. The patient states she has had a followup with Cuong Botello MD, her endocrinologist last week. She had laboratories on 06/22/2018: Hemoglobin A1c was 6.4%, CBC was normal, CMP showed chloride 108, glucose 137, otherwise normal. Cholesterol panel noted. TSH was low at 0.31 with a normal T4. Urinalysis showed 1000 glucose. Her weight is down 12 pounds since last visit. She states Dr. Botello thinks it may be one of her diabetic medications and has switched that. She is on Prilosec 40 mg daily and states her heartburn is doing well. She denied nausea, vomiting, or dysphagia. Her last EGD/colonoscopy on 12/06/2017 showed gastritis and hemorrhoids.     Laboratories from last visit on 05/01/2018: Alpha-gal for the most part was negative, with beef slightly positive at 0.16, lamb and pork were less than 0.10. However, her IgE was high at 1.34. I suggested she try avoiding beef first. She does not really eat lamb or pork very much.     ASSESSMENT AND PLAN: Possible alpha-gal allergy although not entirely clear but she does have a high IgE. We will discontinue the Bentyl and try switching her to Levbid 0.375 mg b.i.d. She is encouraged to keep her followup with Dr. Botello. We will see her back in 6-8 weeks, further pending clinical course and the results of the above.       The following portions of the patient's history were reviewed and updated as appropriate:   Past Medical History:   Diagnosis Date   • Diabetes mellitus    • Diarrhea    •  Diverticular disease of colon    • Gastroesophageal reflux disease    • Hiatal hernia    • Irritable bowel syndrome    • Nausea    • Right upper quadrant pain    • Traumatic brain injury 06/2017     Past Surgical History:   Procedure Laterality Date   • CHOLECYSTECTOMY     • COLONOSCOPY  12/12/2011    Mild diverticulosis in sigmoid colon. Stool collected for study.   • COLONOSCOPY N/A 12/6/2017    Procedure: COLONOSCOPY--look TI, bx;  Surgeon: Marshal Staley MD;  Location: [facility] ENDOSCOPY;  Service:    • ENDOSCOPY N/A 12/6/2017    Procedure: ESOPHAGOGASTRODUODENOSCOPY--small bowel biopsies;  Surgeon: Marshal Staley MD;  Location: [facility] ENDOSCOPY;  Service:    • ENDOSCOPY W/ PEG TUBE PLACEMENT  12/12/2011    Normal hypopharynx, esophagus, symmetrical & patent pylorus. Hiatus hernia in GE junction. Non-erosive gastritis in stomach. Multiple biopsies taken. Normal duodenum. Multiple biopsies taken.   • UPPER GASTROINTESTINAL ENDOSCOPY  12/12/2011   • UPPER GASTROINTESTINAL ENDOSCOPY  12/06/2017     Family History   Problem Relation Age of Onset   • Breast cancer Other    • Diabetes Other    • Heart disease Other    • Hypertension Other    • Skin cancer Other      OB History     No data available        No Known Allergies  Social History     Social History   • Marital status:      Social History Main Topics   • Smoking status: Never Smoker   • Smokeless tobacco: Never Used   • Alcohol use No   • Drug use: No   • Sexual activity: Defer     Other Topics Concern   • Not on file       Current Outpatient Prescriptions:   •  Bromocriptine Mesylate (CYCLOSET) 0.8 MG tablet, Take 0.8 mg by mouth Daily. 6 TABLETS DAILY, Disp: , Rfl:   •  busPIRone (BUSPAR) 7.5 MG tablet, Take 7.5 mg by mouth Every Night., Disp: , Rfl: 0  •  DULoxetine (CYMBALTA) 30 MG capsule, take 1 tablet by mouth every morning for anxiety depression, Disp: , Rfl: 0  •  Empagliflozin (JARDIANCE) 25 MG tablet, Take 25 mg by mouth Daily., Disp: , Rfl:  "  •  fluticasone (FLONASE) 50 MCG/ACT nasal spray, 2 sprays into each nostril Daily., Disp: 16 g, Rfl: 11  •  MELATONIN PO, Take 3 mg by mouth Every Night., Disp: , Rfl:   •  meloxicam (MOBIC) 15 MG tablet, Take 15 mg by mouth Daily., Disp: , Rfl:   •  metFORMIN (GLUCOPHAGE) 1000 MG tablet, Take 1,000 mg by mouth 2 (Two) Times a Day With Meals., Disp: , Rfl:   •  omeprazole (priLOSEC) 40 MG capsule, Take 40 mg by mouth Daily., Disp: , Rfl: 0  •  pioglitazone (ACTOS) 30 MG tablet, Take 30 mg by mouth Daily., Disp: , Rfl:   •  pravastatin (PRAVACHOL) 20 MG tablet, Take 20 mg by mouth Daily., Disp: , Rfl:   •  hyoscyamine (LEVBID) 0.375 MG 12 hr tablet, Take 1 tablet by mouth 2 (Two) Times a Day., Disp: 60 tablet, Rfl: 3  Review of Systems  Review of Systems       Objective    /62 (BP Location: Left arm)   Pulse 73   Ht 167.6 cm (66\")   Wt 60.3 kg (133 lb)   BMI 21.47 kg/m²   Physical Exam   Constitutional: She is oriented to person, place, and time. She appears well-developed and well-nourished. No distress.   HENT:   Head: Normocephalic and atraumatic.   Eyes: EOM are normal. Pupils are equal, round, and reactive to light.   Neck: Normal range of motion.   Cardiovascular: Normal rate, regular rhythm and normal heart sounds.    Pulmonary/Chest: Effort normal and breath sounds normal.   Abdominal: Soft. Bowel sounds are normal. She exhibits distension. She exhibits no shifting dullness, no abdominal bruit, no ascites and no mass. There is no hepatosplenomegaly. There is tenderness. There is no rigidity, no rebound, no guarding and no CVA tenderness. No hernia. Hernia confirmed negative in the ventral area.   Mild diffuse, scar   Musculoskeletal: Normal range of motion.   Neurological: She is alert and oriented to person, place, and time.   Skin: Skin is warm and dry.   Psychiatric: She has a normal mood and affect. Her behavior is normal. Judgment and thought content normal.   Nursing note and vitals " reviewed.    Assessment/Plan      1. Nausea    2. Gastroesophageal reflux disease with esophagitis    3. Generalized abdominal pain    4. Diarrhea, unspecified type    .   Sravanthi was seen today for nausea, heartburn and diarrhea.    Diagnoses and all orders for this visit:    Nausea    Gastroesophageal reflux disease with esophagitis    Generalized abdominal pain    Diarrhea, unspecified type    Other orders  -     hyoscyamine (LEVBID) 0.375 MG 12 hr tablet; Take 1 tablet by mouth 2 (Two) Times a Day.        Orders placed during this encounter include:  No orders of the defined types were placed in this encounter.      Medications prescribed:  New Medications Ordered This Visit   Medications   • hyoscyamine (LEVBID) 0.375 MG 12 hr tablet     Sig: Take 1 tablet by mouth 2 (Two) Times a Day.     Dispense:  60 tablet     Refill:  3     Discontinued Medications       Reason for Discontinue    propranolol (INDERAL) 40 MG tablet Discontinued by another clinician    divalproex (DEPAKOTE) 500 MG DR tablet Discontinued by another clinician        Requested Prescriptions     Signed Prescriptions Disp Refills   • hyoscyamine (LEVBID) 0.375 MG 12 hr tablet 60 tablet 3     Sig: Take 1 tablet by mouth 2 (Two) Times a Day.       Review and/or summary of lab tests, radiology, procedures, medications. Review and summary of old records and obtaining of history. The risks and benefits of my recommendations, as well as other treatment options were discussed with the patient today. Questions were answered.    Follow-up: Return in about 2 months (around 8/26/2018), or if symptoms worsen or fail to improve.     * Surgery not found *      This document has been electronically signed by Abel Laboy PA-C on June 26, 2018 6:20 PM      Results for orders placed or performed in visit on 05/01/18   Alpha - Gal Panel   Result Value Ref Range    Beef 0.16 <0.35 kU/L    Class Description 0/1     Lamb <0.10 <0.35 kU/L    Class Interpretation 0      Pork <0.10 <0.35 kU/L    Class Interpretation 0     Alpha Gal IgE 1.34 (H) <0.35 kU/L   Results for orders placed or performed during the hospital encounter of 12/06/17   Tissue Pathology Exam - Tissue, Small Intestine, Duodenum   Result Value Ref Range    Case Report       Surgical Pathology Report                         Case: [id number]                                  Authorizing Provider:  Marshal Staley MD         Collected:           12/06/2017 10:50 AM          Ordering Location:     [facility]             Received:            12/06/2017 12:01 PM                                 [facility]                                                     Pathologist:           Jared Kelley MD                                                          Specimens:   1) - Small Intestine, Duodenum, small bowel bx                                                      2) - Gastric, Antrum, antrum bx                                                                     3) - Small Intestine, Ileum, ti bx                                                                  4) - Large Intestine, colonic mucosa bx                                                    Final Diagnosis       1.  MUCOSA, DUODENUM:  NO SIGNIFICANT HISTOLOGIC ABNORMALITY.    2.  MUCOSA, ANTRUM OF STOMACH:  CHRONIC GASTRITIS WITH BENIGN LYMPHOID AGGREGATES AND CONGESTION.  NEGATIVE FOR HELICOBACTER PYLORI (HP IMMUNOSTAIN).    3.  MUCOSA, TERMINAL ILEUM:  NO SIGNIFICANT HISTOLOGIC ABNORMALITY.    4.  MUCOSA, COLON:  NO SIGNIFICANT HISTOLOGIC ABNORMALITY.      Comment       Helicobacter pylori (HP) immunostain is performed because an appropriate inflammatory milieu is present and organisms are not seen on H & E stained slides.    HP immunostain was developed and its performance characteristics determined by [facility].  It has not been cleared or approved by the U.S. Food and Drug Administration.  The FDA has  determined that such clearance or approval is not necessary.  This test is used for clinical purposes.  It should not be regarded as investigational or for research.  This laboratory is certified under the Clinical Laboratory Improvement Amendments of 1988 (CLIA-88) as qualified to perform high complexity clinical laboratory testing.            Gross Description       Received for examination are 4 containers, each of which have nodular bits of white soft tissue measuring 0.3-0.5 cc in aggregate.  All specimens are embedded as labeled.  1A duodenum; 2A antrum of stomach; 3A terminal ileum; 4A mucosa of colon.      Embedded Images     POC Glucose Fingerstick   Result Value Ref Range    Glucose 81 70 - 130 mg/dL   Results for orders placed or performed in visit on 11/28/17   Gastrointestinal Panel, PCR - Stool, Per Rectum   Result Value Ref Range    Campylobacter Not Detected Not Detected    Clostridium difficile (toxin A/B) Not Detected Not Detected, NA formed stool, C diff not applicable on patient less than one year of age    Plesiomonas shigelloides Not Detected Not Detected    Salmonella Not Detected Not Detected    Vibrio Not Detected Not Detected    Vibrio cholerae Not Detected Not Detected    Yersinia enterocolitica Not Detected Not Detected    Enteroaggregative E. coli (EAEC) Not Detected Not Detected    Enteropathogenic E. coli (EPEC) Not Detected Not Detected    Enterotoxigenic E. coli (ETEC) lt/st Not Detected Not Detected    Shiga-like toxin-producing E. coli (STEC) stx1/stx2 Not Detected Not Detected    E. coli O157 Not Detected Not Detected    Shigella/Enteroinvasive E. coli (EIEC) Not Detected Not Detected    Cryptosporidium Not Detected Not Detected    Cyclospora cayetanensis Not Detected Not Detected    Entamoeba histolytica Not Detected Not Detected    Giardia lamblia Not Detected Not Detected    Adenovirus F40/41 Not Detected Not Detected    Astrovirus Not Detected Not Detected    Norovirus GI/GII  Not Detected Not Detected    Rotavirus A Not Detected Not Detected    Sapovirus (I, II, IV or V) Not Detected Not Detected   Glia(IgA / G) & TTG(IgA / G)   Result Value Ref Range    Gliadin Deamidated Peptide Ab, IgA 5 0 - 19 units    Deaminated Gliadin Ab IgG 4 0 - 19 units    Tissue Transglutaminase IgA <2 0 - 3 U/mL    Tissue Transglutaminase IgG <2 0 - 5 U/mL   Allergens (12) Foods   Result Value Ref Range    Class Description Comment     Egg White <0.10 Class 0 kU/L    Milk, Cow's <0.10 Class 0 kU/L    CodFish <0.10 Class 0 kU/L    Sesame Seed <0.10 Class 0 kU/L    Peanut <0.10 Class 0 kU/L    Soybean <0.10 Class 0 kU/L    Hazelnut <0.10 Class 0 kU/L    Shrimp <0.10 Class 0 kU/L    Scallop <0.10 Class 0 kU/L    Gluten <0.10 Class 0 kU/L    Ralston <0.10 Class 0 kU/L    Wheat <0.10 Class 0 kU/L   Trypsin   Result Value Ref Range    Trypsin 471 169 - 773 ng/mL   Lipase   Result Value Ref Range    Lipase 104 23 - 300 U/L   Amylase   Result Value Ref Range    Amylase 60 25 - 140 U/L   Results for orders placed or performed in visit on 11/07/16    COLONOSCOPY   Result Value Ref Range     Colonoscopy completed; ordered by Dr. Ijeoma Joseph    Results for orders placed or performed in visit on 12/12/11   Converted Surgical Pathology   Result Value Ref Range    Spec Descr 1 SPECIMEN(S): A SMALL BOWEL BIOPSY     Specimen description 2 SPECIMEN(S): B GASTRIC BIOPSY     Specimen description 3 SPECIMEN(S): C TERMINAL ILEUM BIOPSY     Specimen description 4 SPECIMEN(S): D MUCOSAL COLON BIOPSY     Preoperative Diagnosis   PREOPERATIVE DIAGNOSIS:  Diarrhea       Postoperative Diagnosis         POSTOPERATIVE DIAGNOSIS:  Small bowel (A), gastric mucosa (B), terminal ileum (C), colonic mucosa  (D)      Gross Description         GROSS DESCRIPTION:  In 4 containers, each of these show mucosal biopsies measuring up to 0.3  cm in greatest dimension.  Embedded accordingly.  (A) small bowel, (B)  gastric mucosa, (C) terminal ileum,  "(D) colonic mucosa.      Final Diagnosis         FINAL DIAGNOSIS:  A.  SMALL BOWEL, MUCOSAL BIOPSY:             NO SIGNIFICANT PATHOLOGIC DIAGNOSIS.  B.  GASTRIC MUCOSAL BIOPSY:             MILD CHRONIC INFLAMMATION.             NO EVIDENCE OF HELICOBACTER PYLORI (IP STAIN PERFORMED).  C.  TERMINAL ILEUM, MUCOSAL BIOPSY:             NO SIGNIFICANT PATHOLOGIC DIAGNOSIS.  D.  COLONIC MUCOSAL BIOPSY, RANDOM:             NO SIGNIFICANT PATHOLOGIC DIAGNOSIS.      Comment         COMMENT:  Helicobacter immunoperoxidase stain performed on block B.  HP immunostain was developed and its performance characteristics  determined by [facility].  It has not been  cleared or approved by the U.S. Food and Drug Administration.  The FDA  has determined that such clearance or approval is not necessary.  This  test is used for clinical purposes.  It should not be regarded as  investigational or for research.  This laboratory is certified under the  Clinical Laboratory Improvement Amendments of 1988 (CLIA-88) as  qualified to perform high complexity clinical laboratory testing.      CONVERTED (HISTORICAL) FINAL PATHOLOGIST       Diagnostician:  BESSIE ESTES M.D.  Pathologist  Electronically Signed 12/13/2011      Diagnosis Code   DIAGNOSIS CODE:  4      Results for orders placed or performed in visit on 11/25/03   Converted Surgical Pathology   Result Value Ref Range    Spec Descr 1 SPECIMEN(S): A GASTRIC BIOPSY     Gross Description         GROSS DESCRIPTION:  The specimen is labeled \"gastric mucosa\" and consists of two tan  fragments measuring 0.8 x 0.2 x 0.3 cm together.  Totally submitted.      Final Diagnosis         FINAL DIAGNOSIS:  GASTRIC MUCOSA, BIOPSY:            CHRONIC GASTRITIS SHOWING MINIMAL ACTIVITY.            A SPECIAL STAIN FOR HELICOBACTER PYLORI IS NEGATIVE.  pgl    DIAGNOSIS CODE:  6      Comment   CLINICAL DIAGNOSIS:  Gastric mucosa biopsy       CONVERTED (HISTORICAL) FINAL " "PATHOLOGIST       Diagnostician:  JOY ALVARADO M.D.  Pathologist  Electronically Signed 11/28/2003     Results for orders placed or performed in visit on 04/01/03   Converted Surgical Pathology   Result Value Ref Range    Spec Descr 1 SPECIMEN(S): A MUCOSAL COLON BIOPSY     Gross Description         GROSS DESCRIPTION:  The container is labeled \"mucosa, colon (cold biopsy)\" and has nodular  bits of white soft tissue 0.6 cc in aggregate.  The entire specimen is  embedded.      Final Diagnosis         FINAL DIAGNOSIS:  MUCOSA, COLON:              FRAGMENTS OF MUCOSA OF THE COLON WITH A FEW BENIGN                   LYMPHOID NODULES.  [facility]    DIAGNOSIS CODE:  2      Comment   CLINICAL DIAGNOSIS:  Diarrhea       CONVERTED (HISTORICAL) FINAL PATHOLOGIST       Diagnostician:  ALVARO HEREDIA M.D.  Pathologist  Electronically Signed 04/02/2003         Some portions of this note have been dictated using voice recognition software and may contain errors and/or omissions.   "

## 2018-06-26 NOTE — PATIENT INSTRUCTIONS
MyPlate from [employer]  The general, healthful diet is based on the 2010 Dietary Guidelines for Americans. The amount of food you need to eat from each food group depends on your age, sex, and level of physical activity and can be individualized by a dietitian. Go to ChooseMyPlate.gov for more information.  What do I need to know about the MyPlate plan?  · Enjoy your food, but eat less.  · Avoid oversized portions.  ? ½ of your plate should include fruits and vegetables.  ? ¼ of your plate should be grains.  ? ¼ of your plate should be protein.  Grains  · Make at least half of your grains whole grains.  · For a 2,000 calorie daily food plan, eat 6 oz every day.  · 1 oz is about 1 slice bread, 1 cup cereal, or ½ cup cooked rice, cereal, or pasta.  Vegetables  · Make half your plate fruits and vegetables.  · For a 2,000 calorie daily food plan, eat 2½ cups every day.  · 1 cup is about 1 cup raw or cooked vegetables or vegetable juice or 2 cups raw leafy greens.  Fruits  · Make half your plate fruits and vegetables.  · For a 2,000 calorie daily food plan, eat 2 cups every day.  · 1 cup is about 1 cup fruit or 100% fruit juice or ½ cup dried fruit.  Protein  · For a 2,000 calorie daily food plan, eat 5½ oz every day.  · 1 oz is about 1 oz meat, poultry, or fish, ¼ cup cooked beans, 1 egg, 1 Tbsp peanut butter, or ½ oz nuts or seeds.  Dairy  · Switch to fat-free or low-fat (1%) milk.  · For a 2,000 calorie daily food plan, eat 3 cups every day.  · 1 cup is about 1 cup milk or yogurt or soy milk (soy beverage), 1½ oz natural cheese, or 2 oz processed cheese.  Fats, Oils, and Empty Calories  · Only small amounts of oils are recommended.  · Empty calories are calories from solid fats or added sugars.  · Compare sodium in foods like soup, bread, and frozen meals. Choose the foods with lower numbers.  · Drink water instead of sugary drinks.  What foods can I eat?  Grains  Whole grains such as whole wheat, quinoa, millet, and  bulgur. Bread, rolls, and pasta made from whole grains. Brown or wild rice. Hot or cold cereals made from whole grains and without added sugar.  Vegetables  All fresh vegetables, especially fresh red, dark green, or orange vegetables. Peas and beans. Low-sodium frozen or canned vegetables prepared without added salt. Low-sodium vegetable juices.  Fruits  All fresh, frozen, and dried fruits. Canned fruit packed in water or fruit juice without added sugar. Fruit juices without added sugar.  Meats and Other Protein Sources  Boiled, baked, or grilled lean meat trimmed of fat. Skinless poultry. Fresh seafood and shellfish. Canned seafood packed in water. Unsalted nuts and unsalted nut butters. Tofu. Dried beans and pea. Eggs.  Dairy  Low-fat or fat-free milk, yogurt, and cheeses.  Sweets and Desserts  Frozen desserts made from low-fat milk.  Fats and Oils  Olive, peanut, and canola oils and margarine. Salad dressing and mayonnaise made from these oils.  Other  Soups and casseroles made from allowed ingredients and without added fat or salt.  The items listed above may not be a complete list of recommended foods or beverages. Contact your dietitian for more options.  What foods are not recommended?  Grains  Sweetened, low-fiber cereals. Packaged baked goods. Snack crackers and chips. Cheese crackers, butter crackers, and biscuits. Frozen waffles, sweet breads, doughnuts, pastries, packaged baking mixes, pancakes, cakes, and cookies.  Vegetables  Regular canned or frozen vegetables or vegetables prepared with salt. Canned tomatoes. Canned tomato sauce. Fried vegetables. Vegetables in cream sauce or cheese sauce.  Fruits  Fruits packed in syrup or made with added sugar.  Meats and Other Protein Sources  Marbled or fatty meats such as ribs. Poultry with skin. Fried meats, poultry, eggs, or fish. Sausages, hot dogs, and deli meats such as pastrami, bologna, or salami.  Dairy  Whole milk, cream, cheeses made from whole milk,  sour cream. Ice cream or yogurt made from whole milk or with added sugar.  Beverages  For adults, no more than one alcoholic drink per day. Regular soft drinks or other sugary beverages. Juice drinks.  Sweets and Desserts  Sugary or fatty desserts, candy, and other sweets.  Fats and Oils  Solid shortening or partially hydrogenated oils. Solid margarine. Margarine that contains trans fats. Butter.  The items listed above may not be a complete list of foods and beverages to avoid. Contact your dietitian for more information.  This information is not intended to replace advice given to you by your health care provider. Make sure you discuss any questions you have with your health care provider.  Document Released: 01/06/2009 Document Revised: 05/25/2017 Document Reviewed: 11/26/2014  [employer] Patient Education © 2018 [employer]  BMI for Adults  Body mass index (BMI) is a number that is calculated from a person's weight and height. In most adults, the number is used to find how much of an adult's weight is made up of fat. BMI is not as accurate as a direct measure of body fat.  How is BMI calculated?  BMI is calculated by dividing weight in kilograms by height in meters squared. It can also be calculated by dividing weight in pounds by height in inches squared, then multiplying the resulting number by 703. Charts are available to help you find your BMI quickly and easily without doing this calculation.  How is BMI interpreted?  Health care professionals use BMI charts to identify whether an adult is underweight, at a normal weight, or overweight based on the following guidelines:  · Underweight: BMI less than 18.5.  · Normal weight: BMI between 18.5 and 24.9.  · Overweight: BMI between 25 and 29.9.  · Obese: BMI of 30 and above.    BMI is usually interpreted the same for males and females.  Weight includes both fat and muscle, so someone with a muscular build, such as an athlete, may have a BMI that is  higher than 24.9. In cases like these, BMI may not accurately depict body fat. To determine if excess body fat is the cause of a BMI of 25 or higher, further assessments may need to be done by a health care provider.  Why is BMI a useful tool?  BMI is used to identify a possible weight problem that may be related to a medical problem or may increase the risk for medical problems. BMI can also be used to promote changes to reach a healthy weight.  This information is not intended to replace advice given to you by your health care provider. Make sure you discuss any questions you have with your health care provider.  Document Released: 08/29/2005 Document Revised: 04/27/2017 Document Reviewed: 05/15/2015  [employer] Patient Education © 2018 [employer]

## 2018-12-05 RX ORDER — FLUTICASONE PROPIONATE 50 MCG
SPRAY, SUSPENSION (ML) NASAL
Qty: 16 G | Refills: 11 | Status: SHIPPED | OUTPATIENT
Start: 2018-12-05 | End: 2020-02-25 | Stop reason: SDUPTHER

## 2019-01-18 ENCOUNTER — CLINICAL SUPPORT (OUTPATIENT)
Dept: AUDIOLOGY | Facility: CLINIC | Age: 59
End: 2019-01-18

## 2019-01-18 ENCOUNTER — OFFICE VISIT (OUTPATIENT)
Dept: OTOLARYNGOLOGY | Facility: CLINIC | Age: 59
End: 2019-01-18

## 2019-01-18 VITALS — HEIGHT: 66 IN | RESPIRATION RATE: 17 BRPM | WEIGHT: 130 LBS | BODY MASS INDEX: 20.89 KG/M2

## 2019-01-18 DIAGNOSIS — H61.23 BILATERAL IMPACTED CERUMEN: Primary | ICD-10-CM

## 2019-01-18 DIAGNOSIS — H90.42 SENSORINEURAL HEARING LOSS (SNHL) OF LEFT EAR WITH UNRESTRICTED HEARING OF RIGHT EAR: Primary | ICD-10-CM

## 2019-01-18 DIAGNOSIS — H90.42 SENSORINEURAL HEARING LOSS (SNHL) OF LEFT EAR WITH UNRESTRICTED HEARING OF RIGHT EAR: ICD-10-CM

## 2019-01-18 DIAGNOSIS — H61.22 IMPACTED CERUMEN OF LEFT EAR: ICD-10-CM

## 2019-01-18 DIAGNOSIS — J31.0 CHRONIC RHINITIS: ICD-10-CM

## 2019-01-18 PROCEDURE — 92567 TYMPANOMETRY: CPT | Performed by: AUDIOLOGIST

## 2019-01-18 PROCEDURE — 99213 OFFICE O/P EST LOW 20 MIN: CPT | Performed by: OTOLARYNGOLOGY

## 2019-01-18 PROCEDURE — 92557 COMPREHENSIVE HEARING TEST: CPT | Performed by: AUDIOLOGIST

## 2019-01-18 PROCEDURE — 69210 REMOVE IMPACTED EAR WAX UNI: CPT | Performed by: OTOLARYNGOLOGY

## 2019-01-18 NOTE — PROGRESS NOTES
STANDARD AUDIOMETRIC EVALUATION      Name:  Sravanthi Ellison  :  1960  Age:  58 y.o.  Date of Evaluation:  2019      HISTORY    Reason for visit:  Sravanthi Ellison is seen today for a hearing evaluation at the request of Dr. Osiel Menon.  Patient reports her left ear feels stopped up, but she isn't having problems hearing.      EVALUATION    See Audiogram    RESULTS        Otoscopy and Tympanometry 226 Hz :  Right Ear:  Otoscopy:  Clear ear canal          Tympanometry:  Middle ear function within normal limits    Left Ear:   Otoscopy:  Cerumen impaction, Testing completed after ears were cleaned        Tympanometry:  Middle ear function within normal limits    Test technique:  Standard Audiometry     Pure Tone Audiometry:   Patient responded to pure tones at 5-20 dB for 250-8000 Hz in right ear, and at 5-60 dB for 250-8000 Hz in left ear.       Speech Audiometry:        Right Ear:  Speech Reception Threshold (SRT) was obtained at 0 dBHL                 Speech Discrimination scores were 100% in quiet when words were presented at 40 dBHL       Left Ear:  Speech Reception Threshold (SRT) was obtained at 0 dBHL                 Speech Discrimination scores were 96% in quiet when words were presented at 40 dBHL    Reliability:   good    IMPRESSIONS:  1.  Tympanometry results are consistent with Middle ear function within normal limits in both ears.  2.  Pure tone results are consistent with hearing sensitivity within normal limits for right ear, and normal to moderate high frequency sensorineural hearing loss in left ear.       RECOMMENDATIONS:  Patient is seeing the Ear Nose and Throat physician immediately following this examination.  It was a pleasure seeing Sravanthi Ellison in Audiology today.  We would be happy to do further testing or discuss these test as necessary.          This document has been electronically signed by Jacque Vega MS CCC-STEFFEN on 2019 11:08 AM        Jacque Vega MS [facility]  Licensed Audiologist

## 2019-01-21 NOTE — PROGRESS NOTES
Subjective   Sravanthi Ellison is a 58 y.o. female.       History of Present Illness     Patient is followed with mildly asymmetrical hearing that has been stable for some time.  Has a high frequency sensorineural loss on the left and normal hearing on the right.  Also has trouble with recurring cerumen impactions.  It was decided to only check her hearing every other year and check her for cerumen impactions yearly.  Suffered a traumatic brain injury in 2017 and she states she also apparently had a tympanic membrane perforation as a result of that.  Reports that her neurologist has tested her and told her her neurologic function has returned normal and she feels like her hearing is about the same although she thinks her ears may be stopped up.  She also has chronic rhinitis for which she uses Flonase and feels that this is helpful.  No purulent rhinorrhea.      The following portions of the patient's history were reviewed and updated as appropriate: allergies, current medications, past family history, past medical history, past social history, past surgical history and problem list.     reports that  has never smoked. she has never used smokeless tobacco. She reports that she does not drink alcohol or use drugs.   Patient is not a tobacco user and has not been counseled for use of tobacco products      Review of Systems   Constitutional: Negative for fever.           Objective   Physical Exam  General: Well-developed well-nourished female in no acute distress.  Alert and oriented ×3. Head: Normocephalic. Face: Symmetrical strength and appearance.Voice:Strong. Speech:Fluent  Ears: External ears no deformity, ear canal shows cerumen impactions bilaterally  Using the binocular microscope for visualization, cerumen impaction was removed from bilateral ear canal(s) using instrumentation. This was personally performed by Osiel Menon MD  Following cerumen removal tympanic membranes are noted be intact and  clear.  Nose: Nares show no discharge mass polyp or purulence.  Boggy mucosa is present.  No gross external deformity.  Septum: Midline  Oral cavity: Lips and gums without lesions.  Tongue and floor of mouth without lesions.  Parotid and submandibular ducts unobstructed.  No mucosal lesions on the buccal mucosa or vestibule of the mouth.  Pharynx: No erythema exudate mass or ulcer  Neck: No lymphadenopathy.  No thyromegaly.  Trachea and larynx midline.  No masses in the parotid or submandibular glands.    Audiogram is obtained and reviewed and shows normal hearing on the right.  I frequency loss on the left that is essentially unchanged from previous although she now has a small conductive component at 4 and 6000 Hz that may be related to her reported injury.  Tympanograms are type A.  Discrimination scores are 100% on the right 96% on the left.      Assessment/Plan   Sravanthi was seen today for follow-up.    Diagnoses and all orders for this visit:    Bilateral impacted cerumen    Sensorineural hearing loss (SNHL) of left ear with unrestricted hearing of right ear    Chronic rhinitis        Plan: Cerumen removed as described above.  Continue Flonase as long as she perceives to be of benefit.  Return in a year to evaluate for recurrent cerumen impaction.  Plan on repeating audiogram in 2 years unless she becomes more symptomatic.

## 2020-01-31 ENCOUNTER — OFFICE VISIT (OUTPATIENT)
Dept: OTOLARYNGOLOGY | Facility: CLINIC | Age: 60
End: 2020-01-31

## 2020-01-31 VITALS — HEIGHT: 66 IN | BODY MASS INDEX: 19.77 KG/M2 | OXYGEN SATURATION: 98 % | WEIGHT: 123 LBS

## 2020-01-31 DIAGNOSIS — H61.22 IMPACTED CERUMEN, LEFT EAR: Primary | ICD-10-CM

## 2020-01-31 PROCEDURE — 69210 REMOVE IMPACTED EAR WAX UNI: CPT | Performed by: OTOLARYNGOLOGY

## 2020-01-31 NOTE — PROGRESS NOTES
Subjective   Sravanthi Ellison is a 59 y.o. female.       History of Present Illness   Patient has a history of bilateral sensorineural hearing loss that is mildly asymmetrical but is been stable for some time and so decision was made to just check her hearing every 2 years and she had a checked last year however she also has recurring cerumen impactions and I told her I would see her yearly for those and she said her ears feel full particularly on the left.      The following portions of the patient's history were reviewed and updated as appropriate: allergies, current medications, past family history, past medical history, past social history, past surgical history and problem list.     reports that she has never smoked. She has never used smokeless tobacco. She reports that she does not drink alcohol or use drugs.   Patient is not a tobacco user and has not been counseled for use of tobacco products  Following cerumen removal tympanic membrane was noted to be intact and clear and patient noted immediate subjective improvement in hearing      Review of Systems        Objective   Physical Exam  Ears: External ears no deformity.  Right ear canal shows some uninfected squamous debris that is cleaned under the microscope using instrumentation.  Beyond this tympanic membrane is intact and clear.  Left ear is completely occluded with cerumen  Using the binocular microscope for visualization, cerumen impaction was removed from left ear canal(s) using instrumentation. This was personally performed by Osiel Menon MD      Assessment/Plan   Sravanthi was seen today for follow-up.    Diagnoses and all orders for this visit:    Impacted cerumen, left ear      Plan: Cerumen removed as described above.  Return in 1 year with an audiogram at that time.

## 2020-02-25 ENCOUNTER — OFFICE VISIT (OUTPATIENT)
Dept: OTOLARYNGOLOGY | Facility: CLINIC | Age: 60
End: 2020-02-25

## 2020-02-25 VITALS — HEIGHT: 66 IN | BODY MASS INDEX: 21.69 KG/M2 | HEART RATE: 79 BPM | WEIGHT: 135 LBS | OXYGEN SATURATION: 96 %

## 2020-02-25 DIAGNOSIS — J31.0 CHRONIC RHINITIS: Primary | ICD-10-CM

## 2020-02-25 DIAGNOSIS — J34.2 NASAL SEPTAL DEFORMITY: ICD-10-CM

## 2020-02-25 PROCEDURE — 31231 NASAL ENDOSCOPY DX: CPT | Performed by: OTOLARYNGOLOGY

## 2020-02-25 PROCEDURE — 99214 OFFICE O/P EST MOD 30 MIN: CPT | Performed by: OTOLARYNGOLOGY

## 2020-02-25 RX ORDER — MONTELUKAST SODIUM 10 MG/1
10 TABLET ORAL DAILY
Qty: 30 TABLET | Refills: 11 | Status: SHIPPED | OUTPATIENT
Start: 2020-02-25 | End: 2021-02-08

## 2020-02-25 RX ORDER — PREDNISONE 20 MG/1
TABLET ORAL
Qty: 12 TABLET | Refills: 0 | Status: SHIPPED | OUTPATIENT
Start: 2020-02-25 | End: 2021-02-28

## 2020-02-25 RX ORDER — FLUTICASONE PROPIONATE 50 MCG
2 SPRAY, SUSPENSION (ML) NASAL DAILY
Qty: 16 G | Refills: 11 | Status: SHIPPED | OUTPATIENT
Start: 2020-02-25 | End: 2021-03-11

## 2020-02-29 NOTE — PROGRESS NOTES
Subjective   Sravanthi Ellison is a 59 y.o. female.       History of Present Illness   Patient has chronic complaints of nasal congestion, sneezing, rhinorrhea, postnasal drainage.  Also has headaches, otalgia, and dental pain.  Symptoms are worse in the last 2 months.  States that Laurel-Ulman actually helps more than anything.  No previous nasal surgery or injury.  The rhinorrhea is typically not purulent.  Patient is known to me from mildly asymmetrical hearing loss and recurring cerumen impactions and states the symptoms she is complaining of today have significantly worsened since she saw me in office about a month ago.    The following portions of the patient's history were reviewed and updated as appropriate: allergies, current medications, past family history, past medical history, past social history, past surgical history and problem list.     reports that she has never smoked. She has never used smokeless tobacco. She reports that she does not drink alcohol or use drugs.   Patient is not a tobacco user and has not been counseled for use of tobacco products      Review of Systems   Constitutional: Negative for fever.   HENT: Positive for congestion.            Objective   Physical Exam  General: Well-developed well-nourished female in no acute distress.  Alert and oriented x-3.  Voice:Strong. Speech:Fluent  Ears: External ears no deformity, canals no discharge, tympanic membranes intact clear and mobile bilaterally.  Nose: Nares show no discharge mass polyp or purulence.  Boggy mucosa is present.  No gross external deformity.  Septum: The right  Oral cavity: Lips and gums without lesions.  Tongue and floor of mouth without lesions.  Parotid and submandibular ducts unobstructed.  No mucosal lesions on the buccal mucosa or vestibule of the mouth.  Pharynx: No erythema exudate mass or ulcer  Neck: No lymphadenopathy.  No thyromegaly.  Trachea and larynx midline.  No masses in the parotid or submandibular  glands.  Nasal endoscopy is performed: Eric-Synephrine and Xylocaine are instilled the nares bilaterally.  0° scope is passed into each nostril.  The inferior, middle, and superior turbinates as well as nasal septum and nasopharynx are examined.  Pertinent findings include: Markedly angulated septal deformity to the right.  Swollen mucosa throughout the nose with clear discharge.  No mass or polyps    Assessment/Plan   Sravanthi was seen today for follow-up.    Diagnoses and all orders for this visit:    Chronic rhinitis    Nasal septal deformity    Other orders  -     predniSONE (DELTASONE) 20 MG tablet; 1 pill by mouth 3 times a day for 2 days then 1 pill by mouth twice a day for 2 days then 1 pill by mouth daily for 2 days  -     fluticasone (FLONASE) 50 MCG/ACT nasal spray; 2 sprays by Each Nare route Daily.  -     montelukast (SINGULAIR) 10 MG tablet; Take 1 tablet by mouth Daily.      Plan: Continue Flonase and add prednisone taper and Singulair 10 mg p.o. daily to current medical regimen.  Return in 4 weeks, call sooner for problems.

## 2020-03-24 ENCOUNTER — OFFICE VISIT (OUTPATIENT)
Dept: OTOLARYNGOLOGY | Facility: CLINIC | Age: 60
End: 2020-03-24

## 2020-03-24 VITALS — TEMPERATURE: 98.1 F | WEIGHT: 135 LBS | BODY MASS INDEX: 21.69 KG/M2 | OXYGEN SATURATION: 97 % | HEIGHT: 66 IN

## 2020-03-24 DIAGNOSIS — J31.0 CHRONIC RHINITIS: Primary | ICD-10-CM

## 2020-03-24 DIAGNOSIS — J34.2 NASAL SEPTAL DEFORMITY: ICD-10-CM

## 2020-03-24 PROCEDURE — 99213 OFFICE O/P EST LOW 20 MIN: CPT | Performed by: OTOLARYNGOLOGY

## 2020-03-24 RX ORDER — CETIRIZINE HYDROCHLORIDE 10 MG/1
10 TABLET ORAL DAILY
Qty: 30 TABLET | Refills: 11 | Status: SHIPPED | OUTPATIENT
Start: 2020-03-24 | End: 2021-02-28

## 2020-03-25 NOTE — PROGRESS NOTES
Subjective   Sravanthi Ellison is a 60 y.o. female.       History of Present Illness   Patient was seen previously with chronic rhinitis and nasal septal deformity.  This was thought to largely be allergic in nature.  Was given Flonase, prednisone, and Singulair.  Reports that she still has headaches and tooth pain but not as frequent and not as severe as she did previously she is still having some nonpurulent rhinorrhea.  This is again not as bad as it was previously.      The following portions of the patient's history were reviewed and updated as appropriate: allergies, current medications, past family history, past medical history, past social history, past surgical history and problem list.     reports that she has never smoked. She has never used smokeless tobacco. She reports that she does not drink alcohol or use drugs.   Patient is not a tobacco user and has not been counseled for use of tobacco products      Review of Systems   Constitutional: Negative for fever.           Objective   Physical Exam  General: Well-developed well-nourished female in no acute distress.  Alert and oriented x-3. Voice:Strong. Speech:Fluent  Ears: External ears no deformity, canals no discharge, tympanic membranes intact clear and mobile bilaterally.  Nose: Nares show no discharge mass polyp or purulence.  Boggy mucosa is present.  No gross external deformity.  Septum: To the right  Oral cavity: Lips and gums without lesions.  Tongue and floor of mouth without lesions.  Parotid and submandibular ducts unobstructed.  No mucosal lesions on the buccal mucosa or vestibule of the mouth.  Pharynx: No erythema exudate mass or ulcer  Neck: No lymphadenopathy.  No thyromegaly.  Trachea and larynx midline.  No masses in the parotid or submandibular glands.    Assessment/Plan   Sravanthi was seen today for follow-up.    Diagnoses and all orders for this visit:    Chronic rhinitis    Nasal septal deformity    Other orders  -     cetirizine  (zyrTEC) 10 MG tablet; Take 1 tablet by mouth Daily.        Plan: I think since she is somewhat improved it would be reasonable to add Zyrtec 10 mg a day to her current medical regimen.  Advise she take this at bedtime.  Reevaluate in 2 months.  Call sooner for problems.

## 2020-05-26 ENCOUNTER — OFFICE VISIT (OUTPATIENT)
Dept: OTOLARYNGOLOGY | Facility: CLINIC | Age: 60
End: 2020-05-26

## 2020-05-26 VITALS — BODY MASS INDEX: 21.53 KG/M2 | HEIGHT: 66 IN | WEIGHT: 134 LBS | OXYGEN SATURATION: 98 % | TEMPERATURE: 98.4 F

## 2020-05-26 DIAGNOSIS — J31.0 CHRONIC RHINITIS: Primary | ICD-10-CM

## 2020-05-26 DIAGNOSIS — J34.2 NASAL SEPTAL DEFORMITY: ICD-10-CM

## 2020-05-26 PROCEDURE — 99213 OFFICE O/P EST LOW 20 MIN: CPT | Performed by: OTOLARYNGOLOGY

## 2020-05-27 NOTE — PROGRESS NOTES
Subjective   Sravanthi Ellison is a 60 y.o. female.       History of Present Illness   Patient is followed with chronic rhinitis thought to be allergic.  Has been utilizing Flonase and Singulair.  Previously also had a course of prednisone.  At last visit cetirizine was added to the patient's medical regimen.  She reports her symptoms are improved and currently satisfactory.  No purulent rhinorrhea.  Feels like she can breathe well through her nose most of the time.  Headaches and tooth pain are infrequent.      The following portions of the patient's history were reviewed and updated as appropriate: allergies, current medications, past family history, past medical history, past social history, past surgical history and problem list.     reports that she has never smoked. She has never used smokeless tobacco. She reports that she does not drink alcohol or use drugs.   Patient is not a tobacco user and has not been counseled for use of tobacco products      Review of Systems   Constitutional: Negative for fever.           Objective   Physical Exam  General: Well-developed well-nourished female in no acute distress.  Alert and oriented x-3. Voice:Strong. Speech:Fluent  Ears: External ears no deformity, canals no discharge, tympanic membranes intact clear and mobile bilaterally.  Nose: Nares show pale boggy mucosa with no discharge, mass, polyp, purulence.  Septum is to the right.  Oral cavity: Lips and gums without lesions.  Tongue and floor of mouth without lesions.  Parotid and submandibular ducts unobstructed.  No mucosal lesions on the buccal mucosa or vestibule of the mouth.  Pharynx: No erythema exudate mass or ulcer  Neck: No lymphadenopathy.  No thyromegaly.  Trachea and larynx midline.  No masses in the parotid or submandibular glands.      Assessment/Plan   Sravanthi was seen today for follow-up.    Diagnoses and all orders for this visit:    Chronic rhinitis    Nasal septal deformity        Plan: With symptoms  reasonably well controlled I think she should continue her current medical regimen.  We will see her back 4 months which should be during the fall allergy season, call sooner for worsening.

## 2020-09-22 ENCOUNTER — OFFICE VISIT (OUTPATIENT)
Dept: OTOLARYNGOLOGY | Facility: CLINIC | Age: 60
End: 2020-09-22

## 2020-09-22 VITALS — TEMPERATURE: 98.2 F | HEIGHT: 66 IN | BODY MASS INDEX: 22.34 KG/M2 | WEIGHT: 139 LBS

## 2020-09-22 DIAGNOSIS — J34.2 NASAL SEPTAL DEFORMITY: ICD-10-CM

## 2020-09-22 DIAGNOSIS — J31.0 CHRONIC RHINITIS: Primary | ICD-10-CM

## 2020-09-22 PROCEDURE — 99213 OFFICE O/P EST LOW 20 MIN: CPT | Performed by: OTOLARYNGOLOGY

## 2020-09-22 NOTE — PROGRESS NOTES
Subjective   Sravanthi Ellison is a 60 y.o. female.       History of Present Illness   Patient has been seen recently with allergic rhinitis.  Uses Flonase and Singulair daily as well as Zyrtec as needed.  Reports her symptom control has been reasonably good although she says the corn holley bother her quite a bit and she can tell that it is allergy season.  Nonetheless she has had no purulent rhinorrhea.      The following portions of the patient's history were reviewed and updated as appropriate: allergies, current medications, past family history, past medical history, past social history, past surgical history and problem list.     reports that she has never smoked. She has never used smokeless tobacco. She reports that she does not drink alcohol or use drugs.   Patient is not a tobacco user and has not been counseled for use of tobacco products      Review of Systems   Constitutional: Negative for fever.           Objective   Physical Exam  Ears: External ears no deformity.  Canals show some nonobstructing wax.  Beyond this tympanic membranes are intact and clear  Nares: Pale boggy mucosa septum to the right, no discharge or purulence  Oral cavity: Lips and gums without lesions.  Tongue and floor of mouth without lesions.  Parotid and submandibular ducts unobstructed.  No mucosal lesions on the buccal mucosa or vestibule of the mouth.  Pharynx: No erythema exudate or mass  Neck: No lymphadenopathy.  No thyromegaly.  Trachea and larynx midline.  No masses in the parotid or submandibular glands.      Assessment/Plan   Sravanthi was seen today for follow-up.    Diagnoses and all orders for this visit:    Chronic rhinitis    Nasal septal deformity      pLan: I think at this point she is on maximal medical therapy.  She should continue the Flonase and Singulair every day and use the Zyrtec as needed and use nasal saline spray as needed as well.  Counseled avoidance of known irritants.  She has a regularly scheduled  appointment in January for reevaluation of her ears and hearing and should keep that appointment.

## 2021-02-08 RX ORDER — MONTELUKAST SODIUM 10 MG/1
TABLET ORAL
Qty: 30 TABLET | Refills: 11 | Status: SHIPPED | OUTPATIENT
Start: 2021-02-08 | End: 2021-03-19

## 2021-02-23 ENCOUNTER — CLINICAL SUPPORT (OUTPATIENT)
Dept: AUDIOLOGY | Facility: CLINIC | Age: 61
End: 2021-02-23

## 2021-02-23 ENCOUNTER — OFFICE VISIT (OUTPATIENT)
Dept: OTOLARYNGOLOGY | Facility: CLINIC | Age: 61
End: 2021-02-23

## 2021-02-23 VITALS — WEIGHT: 149 LBS | HEIGHT: 66 IN | OXYGEN SATURATION: 99 % | BODY MASS INDEX: 23.95 KG/M2

## 2021-02-23 DIAGNOSIS — J31.0 CHRONIC RHINITIS: ICD-10-CM

## 2021-02-23 DIAGNOSIS — H61.22 IMPACTED CERUMEN, LEFT EAR: ICD-10-CM

## 2021-02-23 DIAGNOSIS — H90.A32 MIXED CONDUCTIVE AND SENSORINEURAL HEARING LOSS OF LEFT EAR WITH RESTRICTED HEARING OF RIGHT EAR: Primary | ICD-10-CM

## 2021-02-23 DIAGNOSIS — H93.13 TINNITUS OF BOTH EARS: ICD-10-CM

## 2021-02-23 PROCEDURE — 69210 REMOVE IMPACTED EAR WAX UNI: CPT | Performed by: OTOLARYNGOLOGY

## 2021-02-23 PROCEDURE — 92557 COMPREHENSIVE HEARING TEST: CPT | Performed by: AUDIOLOGIST

## 2021-02-23 PROCEDURE — 92567 TYMPANOMETRY: CPT | Performed by: AUDIOLOGIST

## 2021-02-23 PROCEDURE — 99213 OFFICE O/P EST LOW 20 MIN: CPT | Performed by: OTOLARYNGOLOGY

## 2021-02-23 RX ORDER — HYDROXYZINE HYDROCHLORIDE 10 MG/1
10 TABLET, FILM COATED ORAL 3 TIMES DAILY PRN
COMMUNITY

## 2021-02-23 RX ORDER — AZELASTINE 1 MG/ML
2 SPRAY, METERED NASAL 2 TIMES DAILY
COMMUNITY
End: 2022-03-04 | Stop reason: SDUPTHER

## 2021-02-23 NOTE — PROGRESS NOTES
STANDARD AUDIOMETRIC EVALUATION      Name:  Sravanthi Ellison  :  1960  Age:  60 y.o.  Date of Evaluation:  2021      HISTORY    Reason for visit:  Sravanthi Ellison is seen today for a yearly hearing test at the request of Dr. Osiel Menon.  Patient reports she gets wax build up in her ears and has to have her ears cleaned regularly.  She states she hasn't had any changes in her hearing.  She states she hears an occasional ringing in her ears, more in the left ear.       EVALUATION    See Audiogram    RESULTS        Otoscopy and Tympanometry 226 Hz :  Right Ear:  Otoscopy:  Clear ear canal          Tympanometry:  Middle ear function within normal limits    Left Ear:   Otoscopy:  Visible ear drum        Tympanometry:  Middle ear function within normal limits    Test technique:  Standard Audiometry     Pure Tone Audiometry:   Patient responded to pure tones at 5-30 dB for 250-8000 Hz in right ear, and at 5-70 dB for 250-8000 Hz in left ear.       Speech Audiometry:        Right Ear:  Speech Reception Threshold (SRT) was obtained at 5 dBHL                 Speech Discrimination scores were 100% in quiet when words were presented at 45 dBHL       Left Ear:  Speech Reception Threshold (SRT) was obtained at 0 dBHL                 Speech Discrimination scores were 96% in quiet when words were presented at 45 dBHL    Reliability:   good    IMPRESSIONS:  1.  Tympanometry results are consistent with Middle ear function within normal limits in both ears.  2.  Pure tone results are consistent with within normal limits to mild high frequency indeterminant hearing loss for right ear, and within normal limits to moderate sloping, high frequency mixed hearing loss in left ear.       RECOMMENDATIONS:  Patient is seeing the Ear Nose and Throat physician immediately following this examination.  It was a pleasure seeing Sravanthi Ellison in Audiology today.  We would be happy to do further testing or discuss these test  as necessary.          This document has been electronically signed by Jacque Vega MS CCC-STEFFEN on February 23, 2021 16:07 CST       Jacque Vega MS CCC-A  Licensed Audiologist

## 2021-03-08 RX ORDER — CETIRIZINE HYDROCHLORIDE 10 MG/1
10 TABLET ORAL DAILY
Qty: 30 TABLET | Refills: 11 | Status: SHIPPED | OUTPATIENT
Start: 2021-03-08 | End: 2021-12-07

## 2021-03-11 RX ORDER — FLUTICASONE PROPIONATE 50 MCG
SPRAY, SUSPENSION (ML) NASAL
Qty: 16 G | Refills: 11 | Status: SHIPPED | OUTPATIENT
Start: 2021-03-11 | End: 2022-01-20

## 2021-03-16 RX ORDER — MONTELUKAST SODIUM 10 MG/1
TABLET ORAL
Qty: 30 TABLET | Refills: 11 | OUTPATIENT
Start: 2021-03-16

## 2021-03-19 RX ORDER — MONTELUKAST SODIUM 10 MG/1
10 TABLET ORAL DAILY
Qty: 90 TABLET | Refills: 3 | Status: SHIPPED | OUTPATIENT
Start: 2021-03-19 | End: 2021-12-09

## 2021-12-07 ENCOUNTER — OFFICE VISIT (OUTPATIENT)
Dept: GASTROENTEROLOGY | Facility: CLINIC | Age: 61
End: 2021-12-07

## 2021-12-07 VITALS
SYSTOLIC BLOOD PRESSURE: 103 MMHG | HEIGHT: 66 IN | HEART RATE: 85 BPM | BODY MASS INDEX: 22.5 KG/M2 | DIASTOLIC BLOOD PRESSURE: 69 MMHG | WEIGHT: 140 LBS

## 2021-12-07 DIAGNOSIS — R13.10 DYSPHAGIA, UNSPECIFIED TYPE: ICD-10-CM

## 2021-12-07 DIAGNOSIS — R19.7 DIARRHEA, UNSPECIFIED TYPE: ICD-10-CM

## 2021-12-07 DIAGNOSIS — R10.13 EPIGASTRIC PAIN: ICD-10-CM

## 2021-12-07 DIAGNOSIS — R10.10 PAIN OF UPPER ABDOMEN: ICD-10-CM

## 2021-12-07 DIAGNOSIS — E53.8 FOLATE DEFICIENCY: ICD-10-CM

## 2021-12-07 DIAGNOSIS — D50.8 OTHER IRON DEFICIENCY ANEMIA: Primary | ICD-10-CM

## 2021-12-07 PROCEDURE — 99203 OFFICE O/P NEW LOW 30 MIN: CPT | Performed by: PHYSICIAN ASSISTANT

## 2021-12-07 RX ORDER — LANSOPRAZOLE 30 MG/1
30 CAPSULE, DELAYED RELEASE ORAL DAILY
Qty: 30 CAPSULE | Refills: 3 | Status: SHIPPED | OUTPATIENT
Start: 2021-12-07 | End: 2021-12-17

## 2021-12-07 RX ORDER — MAGNESIUM OXIDE 400 MG/1
400 TABLET ORAL DAILY
COMMUNITY
Start: 2021-06-10 | End: 2022-06-11

## 2021-12-07 RX ORDER — SODIUM, POTASSIUM,MAG SULFATES 17.5-3.13G
SOLUTION, RECONSTITUTED, ORAL ORAL
Qty: 354 ML | Refills: 0 | Status: SHIPPED | OUTPATIENT
Start: 2021-12-07 | End: 2021-12-22 | Stop reason: HOSPADM

## 2021-12-07 RX ORDER — DEXTROSE AND SODIUM CHLORIDE 5; .45 G/100ML; G/100ML
30 INJECTION, SOLUTION INTRAVENOUS CONTINUOUS PRN
Status: CANCELLED | OUTPATIENT
Start: 2021-12-22

## 2021-12-07 RX ORDER — FOLIC ACID 1 MG/1
1 TABLET ORAL DAILY
Qty: 30 TABLET | Refills: 3 | Status: SHIPPED | OUTPATIENT
Start: 2021-12-07 | End: 2022-03-29 | Stop reason: SDUPTHER

## 2021-12-07 RX ORDER — LANOLIN ALCOHOL/MO/W.PET/CERES
325 CREAM (GRAM) TOPICAL
Qty: 30 TABLET | Refills: 3 | Status: SHIPPED | OUTPATIENT
Start: 2021-12-07 | End: 2022-04-11 | Stop reason: SDUPTHER

## 2021-12-07 NOTE — PROGRESS NOTES
Chief Complaint   Patient presents with   • Abdominal Pain   • Anemia       ENDO PROCEDURE ORDERED: EGD/COLON fe def, abd pain, gerd, ibs    Subjective    Sravanthi Ellison is a 61 y.o. female. she is being seen for consultation today at the request of DUANE Vanegas.    History of Present Illness     This 61-year-old retired female was sent for sent for consultation for her anemia, epigastric pain by Ijeoma Joseph who saw the patient on 2021 with Alpha-gal, IBS, traumatic brain injury. She had laboratories on 2021, showed normal cholesterol, CMP showed a glucose 120, otherwise normal. A1c 6.4%, normal magnesium. CBC showed anemia with hemoglobin 10.8, hematocrit 34.0, ferritin was 4. B12 greater than 2000, folate was 12.5. I had previously seen the patient with GERD, diarrhea 2018, she was given Levbid but had not returned for a follow-up.     Patient presents with 5 out of 10 abdominal pain. She has substernal burning with pain in the mid epigastric region. She states she cannot miss the Prilosec 40 mg daily without severe symptoms. She states she has a lot of sinus drainage. She denied nausea, vomiting, dysphagia. She states she does have IBS, she may pass nothing for a few days, then has very urgent stools. No blood in the stool. Weight is up 7 pounds since last visit. She is on Meloxicam. Last EGD/colonoscopy 2017 showed esophagitis, gastritis, hemorrhoids.     Patient currently denies tobacco, alcohol or illicit substance use. She has a history of diabetes, pneumonia, gallstones, traumatic brain injury, broken arm, fractured foot, eardrum repair, cholecystectomy, rotator cuff repair, carpal tunnel release. Family history of diabetes, heart disease, cancer, hypertension. Father  at age 83 of heart disease. Mother  at age 59 of cancer. Spouse  at age 51 of cancer. Two brothers in good health, 1 child in good health.     A/P: The patient with significant iron deficiency  anemia, folate deficiency with chronic GERD, IBS symptoms. Recommend EGD/colonoscopy with biopsies as indicated. I did recommend treatment with iron and folic acid for the anemia. She would like to try something different than the Prilosec. Will give her a trial on Prevacid. Her medications may be contributing to some of her bowel issues. Will plan follow-up after the above, further pending clinical course and the results of the above.     Thank you very much Ijeoma for this consultation, and for allowing us to participate in the care of your patient. Will keep you informed.       The following portions of the patient's history were reviewed and updated as appropriate:   Past Medical History:   Diagnosis Date   • Diabetes mellitus (HCC)    • Diarrhea    • Diverticular disease of colon    • Gastroesophageal reflux disease    • Hiatal hernia    • Irritable bowel syndrome    • Nausea    • Right upper quadrant pain    • Traumatic brain injury (HCC) 06/2017     Past Surgical History:   Procedure Laterality Date   • BREAST BIOPSY     • CARPAL TUNNEL INJECTION     • CATARACT EXTRACTION     • CHOLECYSTECTOMY     • COLONOSCOPY  12/12/2011    Mild diverticulosis in sigmoid colon. Stool collected for study.   • COLONOSCOPY N/A 12/6/2017    Procedure: COLONOSCOPY--look TI, bx;  Surgeon: Marshal Staley MD;  Location: [facility] ENDOSCOPY;  Service:    • ENDOSCOPY N/A 12/6/2017    Procedure: ESOPHAGOGASTRODUODENOSCOPY--small bowel biopsies;  Surgeon: Marshal Staley MD;  Location: [facility] ENDOSCOPY;  Service:    • ENDOSCOPY W/ PEG TUBE PLACEMENT  12/12/2011    Normal hypopharynx, esophagus, symmetrical & patent pylorus. Hiatus hernia in GE junction. Non-erosive gastritis in stomach. Multiple biopsies taken. Normal duodenum. Multiple biopsies taken.   • SHOULDER ARTHROSCOPY     • TONSILLECTOMY     • UPPER GASTROINTESTINAL ENDOSCOPY  12/12/2011   • UPPER GASTROINTESTINAL ENDOSCOPY  12/06/2017     Family History   Problem Relation Age  of Onset   • Breast cancer Other    • Diabetes Other    • Heart disease Other    • Hypertension Other    • Skin cancer Other      OB History    No obstetric history on file.       Allergies   Allergen Reactions   • Other GI Intolerance     Regulo gal     Social History     Socioeconomic History   • Marital status:    Tobacco Use   • Smoking status: Never Smoker   • Smokeless tobacco: Never Used   Vaping Use   • Vaping Use: Never used   Substance and Sexual Activity   • Alcohol use: No   • Drug use: No   • Sexual activity: Defer     Current Medications:  Prior to Admission medications    Medication Sig Start Date End Date Taking? Authorizing Provider   azelastine (ASTELIN) 0.1 % nasal spray 2 sprays into the nostril(s) as directed by provider 2 (Two) Times a Day. Use in each nostril as directed   Yes Vishal Hsu MD   busPIRone (BUSPAR) 7.5 MG tablet Take 7.5 mg by mouth Every Night. 3/29/18  Yes Vishal Hsu MD   Cyanocobalamin (VITAMIN B-12 ER PO) Take  by mouth.   Yes Vishal Hsu MD   DULoxetine (CYMBALTA) 30 MG capsule take 1 tablet by mouth every morning for anxiety depression 12/20/17  Yes Vishal Hsu MD   Empagliflozin (JARDIANCE) 25 MG tablet Take 25 mg by mouth Daily.   Yes Vishal Hsu MD   fluticasone (FLONASE) 50 MCG/ACT nasal spray INSTILL 2 SPRAYS IN EACH NOSTRIL DAILY 3/11/21  Yes Osiel Menon MD   hydrOXYzine (ATARAX) 10 MG tablet Take 10 mg by mouth 3 (Three) Times a Day As Needed for Itching.   Yes Vishal Hsu MD   magnesium oxide (MAG-OX) 400 MG tablet Take 400 mg by mouth Daily. 6/10/21 6/11/22 Yes Vishal Hsu MD   MELATONIN PO Take 3 mg by mouth Every Night.   Yes Vishal Hsu MD   meloxicam (MOBIC) 15 MG tablet Take 15 mg by mouth Daily.   Yes Vishal Hsu MD   metFORMIN (GLUCOPHAGE) 1000 MG tablet Take 1,000 mg by mouth 2 (Two) Times a Day With Meals.   Yes Vishal Hsu MD  "  montelukast (SINGULAIR) 10 MG tablet Take 1 tablet by mouth Daily. 3/19/21  Yes Osiel Menon MD   omeprazole (priLOSEC) 40 MG capsule Take 40 mg by mouth Daily. 1/8/18  Yes Vishal Hsu MD   pioglitazone (ACTOS) 30 MG tablet Take 30 mg by mouth Daily.   Yes Vishal Hsu MD   pravastatin (PRAVACHOL) 20 MG tablet Take 20 mg by mouth Daily.   Yes Vishal Hsu MD   Semaglutide (OZEMPIC SC) Inject  under the skin into the appropriate area as directed.   Yes Vishal Hsu MD   cetirizine (zyrTEC) 10 MG tablet TAKE 1 TABLET BY MOUTH DAILY 3/8/21   Osiel Menon MD   hyoscyamine (LEVBID) 0.375 MG 12 hr tablet Take 1 tablet by mouth 2 (Two) Times a Day. 6/26/18 12/7/21  Abel Laboy, ALEXIS     Review of Systems  Review of Systems   Constitutional: Negative for unexpected weight change.   HENT: Positive for trouble swallowing.    Gastrointestinal: Positive for abdominal pain, diarrhea, nausea and vomiting. Negative for abdominal distention, anal bleeding, blood in stool, constipation and rectal pain.          Objective    /69   Pulse 85   Ht 167.6 cm (66\")   Wt 63.5 kg (140 lb)   BMI 22.60 kg/m²   Physical Exam  Vitals and nursing note reviewed.   Constitutional:       General: She is not in acute distress.     Appearance: She is well-developed. She is ill-appearing.   HENT:      Head: Normocephalic and atraumatic.   Eyes:      Pupils: Pupils are equal, round, and reactive to light.   Cardiovascular:      Rate and Rhythm: Normal rate and regular rhythm.      Heart sounds: Normal heart sounds.   Pulmonary:      Effort: Pulmonary effort is normal.      Breath sounds: Normal breath sounds.   Abdominal:      General: Bowel sounds are normal. There is no distension or abdominal bruit.      Palpations: Abdomen is soft. Abdomen is not rigid. There is no shifting dullness or mass.      Tenderness: There is abdominal tenderness. There is no guarding or rebound. "      Hernia: No hernia is present. There is no hernia in the ventral area.   Musculoskeletal:         General: Normal range of motion.      Cervical back: Normal range of motion.   Skin:     General: Skin is warm and dry.   Neurological:      Mental Status: She is alert and oriented to person, place, and time.   Psychiatric:         Behavior: Behavior normal.         Thought Content: Thought content normal.         Judgment: Judgment normal.       Assessment/Plan      1. Other iron deficiency anemia    2. Folate deficiency    3. Diarrhea, unspecified type    4. Pain of upper abdomen    5. Epigastric pain    6. Dysphagia, unspecified type    .   Diagnoses and all orders for this visit:    1. Other iron deficiency anemia (Primary)  -     Case Request; Standing  -     COVID PRE-OP / PRE-PROCEDURE SCREENING ORDER (NO ISOLATION) - Swab, Nasopharynx; Future  -     Case Request    2. Folate deficiency  -     Case Request; Standing  -     COVID PRE-OP / PRE-PROCEDURE SCREENING ORDER (NO ISOLATION) - Swab, Nasopharynx; Future  -     Case Request    3. Diarrhea, unspecified type  -     Case Request; Standing  -     COVID PRE-OP / PRE-PROCEDURE SCREENING ORDER (NO ISOLATION) - Swab, Nasopharynx; Future  -     Case Request    4. Pain of upper abdomen  -     Case Request; Standing  -     COVID PRE-OP / PRE-PROCEDURE SCREENING ORDER (NO ISOLATION) - Swab, Nasopharynx; Future  -     Case Request    5. Epigastric pain  -     Case Request; Standing  -     COVID PRE-OP / PRE-PROCEDURE SCREENING ORDER (NO ISOLATION) - Swab, Nasopharynx; Future  -     Case Request    6. Dysphagia, unspecified type  -     Case Request; Standing  -     COVID PRE-OP / PRE-PROCEDURE SCREENING ORDER (NO ISOLATION) - Swab, Nasopharynx; Future  -     Case Request    Other orders  -     folic acid (FOLVITE) 1 MG tablet; Take 1 tablet by mouth Daily.  Dispense: 30 tablet; Refill: 3  -     ferrous sulfate 325 (65 FE) MG EC tablet; Take 1 tablet by mouth Daily  With Breakfast.  Dispense: 30 tablet; Refill: 3  -     Discontinue: lansoprazole (PREVACID) 30 MG capsule; Take 1 capsule by mouth Daily.  Dispense: 30 capsule; Refill: 3  -     Follow Anesthesia Guidelines / Standing Orders; Future  -     Obtain Informed Consent; Future  -     Discontinue: sodium-potassium-magnesium sulfates (Suprep Bowel Prep Kit) 17.5-3.13-1.6 GM/177ML solution oral solution; As directed per instruction sheet for colonoscopy  Dispense: 354 mL; Refill: 0        Orders placed during this encounter include:  Orders Placed This Encounter   Procedures   • COVID PRE-OP / PRE-PROCEDURE SCREENING ORDER (NO ISOLATION) - Swab, Nasopharynx     Standing Status:   Future     Number of Occurrences:   1     Standing Expiration Date:   12/7/2022     Order Specific Question:   Release to patient     Answer:   Immediate     Order Specific Question:   Please select your location:     Answer:   [facility]     Order Specific Question:   COVID Screening Order:     Answer:   In-House: PRE-OP: BD MAX, 8-10 HR TAT [[id number]]     Order Specific Question:   Previously tested for COVID-19?     Answer:   No     Order Specific Question:   Employed in healthcare setting?     Answer:   No     Order Specific Question:   Symptomatic for COVID-19 as defined by CDC?     Answer:   No     Order Specific Question:   Hospitalized for COVID-19?     Answer:   No     Order Specific Question:   Admitted to ICU for COVID-19?     Answer:   No     Order Specific Question:   Resident in a congregate (group) care setting?     Answer:   No     Order Specific Question:   Pregnant?     Answer:   No   • Follow Anesthesia Guidelines / Standing Orders     Standing Status:   Future   • Obtain Informed Consent     Standing Status:   Future     Order Specific Question:   Informed Consent Given For     Answer:   ESOPHAGOGASTRODUODENOSCOPY and colonoscopy       Medications prescribed:  New Medications Ordered This Visit   Medications   • folic acid  (FOLVITE) 1 MG tablet     Sig: Take 1 tablet by mouth Daily.     Dispense:  30 tablet     Refill:  3   • ferrous sulfate 325 (65 FE) MG EC tablet     Sig: Take 1 tablet by mouth Daily With Breakfast.     Dispense:  30 tablet     Refill:  3     Discontinued Medications       Reason for Discontinue     hyoscyamine (LEVBID) 0.375 MG 12 hr tablet    *Therapy completed        Requested Prescriptions     Signed Prescriptions Disp Refills   • folic acid (FOLVITE) 1 MG tablet 30 tablet 3     Sig: Take 1 tablet by mouth Daily.   • ferrous sulfate 325 (65 FE) MG EC tablet 30 tablet 3     Sig: Take 1 tablet by mouth Daily With Breakfast.       Review and/or summary of lab tests, radiology, procedures, medications. Review and summary of old records and obtaining of history. The risks and benefits of my recommendations, as well as other treatment options were discussed with the patient today. Questions were answered.    Follow-up: Return in about 6 weeks (around 1/18/2022), or if symptoms worsen or fail to improve.     ESOPHAGOGASTRODUODENOSCOPY WITH DILATATION--bx (N/A), COLONOSCOPY (N/A)      This document has been electronically signed by Abel Laboy PA-C on December 27, 2021 19:09 CST      Results for orders placed or performed during the hospital encounter of 12/22/21   Tissue Pathology Exam    Specimen: A: Small Intestine, Duodenum; Tissue    B: Gastric, Antrum; Tissue    C: Esophagus, Distal; Tissue    D: Large Intestine; Tissue   Result Value Ref Range    Case Report       Surgical Pathology Report                         Case: [id number]                                  Authorizing Provider:  Marshal Staley MD        Collected:           12/22/2021 11:27 AM          Ordering Location:     [facility]   Received:            12/22/2021 01:12 PM                                 [facility]                                                     Pathologist:           Raphael Noe MD                                                            Specimens:   1) - Small Intestine, Duodenum, small bowel bx                                                      2) - Gastric, Antrum, antrum bx                                                                     3) - Esophagus, Distal, distal esophagus bx                                                         4) - Large Intestine, colonic mucosa bx                                                    Final Diagnosis       SEE SCANNED REPORT       POC Glucose Once    Specimen: Blood   Result Value Ref Range    Glucose 79 70 - 130 mg/dL   Results for orders placed or performed in visit on 12/20/21   COVID-19, [facility] IN-HOUSE, NP SWAB IN TRANSPORT MEDIA 8-10 HR TAT - Swab, Nasopharynx    Specimen: Nasopharynx; Swab   Result Value Ref Range    COVID19 Not Detected Not Detected - Ref. Range   Results for orders placed or performed in visit on 05/01/18   Alpha - Gal Panel    Specimen: Blood   Result Value Ref Range    Beef 0.16 <0.35 kU/L    Class Description 0/1     Lamb <0.10 <0.35 kU/L    Class Interpretation 0     Pork <0.10 <0.35 kU/L    Class Interpretation 0     Alpha Gal IgE 1.34 (H) <0.35 kU/L   Results for orders placed or performed during the hospital encounter of 12/06/17   Tissue Pathology Exam - Tissue, Small Intestine, Duodenum    Specimen: A: Small Intestine, Duodenum; Tissue    B: Gastric, Antrum; Tissue    C: Small Intestine, Ileum; Tissue    D: Large Intestine; Tissue   Result Value Ref Range    Case Report       Surgical Pathology Report                         Case: [id number]                                  Authorizing Provider:  Marshal Staley MD         Collected:           12/06/2017 10:50 AM          Ordering Location:     [facility]             Received:            12/06/2017 12:01 PM                                 [facility]                                                     Pathologist:           Jared Kelley MD                                                           Specimens:   1) - Small Intestine, Duodenum, small bowel bx                                                      2) - Gastric, Antrum, antrum bx                                                                     3) - Small Intestine, Ileum, ti bx                                                                  4) - Large Intestine, colonic mucosa bx                                                    Final Diagnosis       1.  MUCOSA, DUODENUM:  NO SIGNIFICANT HISTOLOGIC ABNORMALITY.    2.  MUCOSA, ANTRUM OF STOMACH:  CHRONIC GASTRITIS WITH BENIGN LYMPHOID AGGREGATES AND CONGESTION.  NEGATIVE FOR HELICOBACTER PYLORI (HP IMMUNOSTAIN).    3.  MUCOSA, TERMINAL ILEUM:  NO SIGNIFICANT HISTOLOGIC ABNORMALITY.    4.  MUCOSA, COLON:  NO SIGNIFICANT HISTOLOGIC ABNORMALITY.      Comment       Helicobacter pylori (HP) immunostain is performed because an appropriate inflammatory milieu is present and organisms are not seen on H & E stained slides.    HP immunostain was developed and [facility] characteristics [facility].  It has not been cleared or approved by the U.S. Food and Drug Administration.  The FDA has determined that such clearance or approval is not necessary.  This test is used for clinical purposes.  It should not be regarded as investigational or for research.  This laboratory is certified under the Clinical Laboratory Improvement Amendments of 1988 (CLIA-88) as qualified to perform high complexity clinical laboratory testing.            Gross Description       Received for examination are 4 containers, each of which have nodular bits of white soft tissue measuring 0.3-0.5 cc in aggregate.  All specimens are embedded as labeled.  1A duodenum; 2A antrum of stomach; 3A terminal ileum; 4A mucosa of colon.      Embedded Images     POC Glucose Fingerstick    Specimen: Blood   Result Value Ref Range    Glucose 81 70 -  130 mg/dL   Results for orders placed or performed in visit on 11/28/17   Gastrointestinal Panel, PCR - Stool, Per Rectum    Specimen: Per Rectum; Stool   Result Value Ref Range    Campylobacter Not Detected Not Detected    Clostridium difficile (toxin A/B) Not Detected Not Detected, NA formed stool, C diff not applicable on patient less than one year of age    Plesiomonas shigelloides Not Detected Not Detected    Salmonella Not Detected Not Detected    Vibrio Not Detected Not Detected    Vibrio cholerae Not Detected Not Detected    Yersinia enterocolitica Not Detected Not Detected    Enteroaggregative E. coli (EAEC) Not Detected Not Detected    Enteropathogenic E. coli (EPEC) Not Detected Not Detected    Enterotoxigenic E. coli (ETEC) lt/st Not Detected Not Detected    Shiga-like toxin-producing E. coli (STEC) stx1/stx2 Not Detected Not Detected    E. coli O157 Not Detected Not Detected    Shigella/Enteroinvasive E. coli (EIEC) Not Detected Not Detected    Cryptosporidium Not Detected Not Detected    Cyclospora cayetanensis Not Detected Not Detected    Entamoeba histolytica Not Detected Not Detected    Giardia lamblia Not Detected Not Detected    Adenovirus F40/41 Not Detected Not Detected    Astrovirus Not Detected Not Detected    Norovirus GI/GII Not Detected Not Detected    Rotavirus A Not Detected Not Detected    Sapovirus (I, II, IV or V) Not Detected Not Detected   Glia(IgA / G) & TTG(IgA / G)    Specimen: Blood   Result Value Ref Range    Gliadin Deamidated Peptide Ab, IgA 5 0 - 19 units    Deaminated Gliadin Ab IgG 4 0 - 19 units    Tissue Transglutaminase IgA <2 0 - 3 U/mL    Tissue Transglutaminase IgG <2 0 - 5 U/mL   Allergens (12) Foods    Specimen: Blood   Result Value Ref Range    Class Description Comment     Egg White <0.10 Class 0 kU/L    Milk, Cow's <0.10 Class 0 kU/L    CodFish <0.10 Class 0 kU/L    Sesame Seed <0.10 Class 0 kU/L    Peanut <0.10 Class 0 kU/L    Soybean <0.10 Class 0 kU/L     Hazelnut <0.10 Class 0 kU/L    Shrimp <0.10 Class 0 kU/L    Scallop <0.10 Class 0 kU/L    Gluten <0.10 Class 0 kU/L    [place] <0.10 Class 0 kU/L    Wheat <0.10 Class 0 kU/L   Trypsin    Specimen: Blood   Result Value Ref Range    Trypsin 471 169 - 773 ng/mL   Lipase    Specimen: Blood   Result Value Ref Range    Lipase 104 23 - 300 U/L   Amylase    Specimen: Blood   Result Value Ref Range    Amylase 60 25 - 140 U/L   Results for orders placed or performed in visit on 11/07/16    COLONOSCOPY   Result Value Ref Range     Colonoscopy completed; ordered by Dr. Ijeoma Joseph    Results for orders placed or performed in visit on 12/12/11   Converted Surgical Pathology    Specimen: Tissue   Result Value Ref Range    Spec Descr 1 SPECIMEN(S): A SMALL BOWEL BIOPSY     Specimen description 2 SPECIMEN(S): B GASTRIC BIOPSY     Specimen description 3 SPECIMEN(S): C TERMINAL ILEUM BIOPSY     Specimen description 4 SPECIMEN(S): D MUCOSAL COLON BIOPSY     Preoperative Diagnosis   PREOPERATIVE DIAGNOSIS:  Diarrhea       Postoperative Diagnosis         POSTOPERATIVE DIAGNOSIS:  Small bowel (A), gastric mucosa (B), terminal ileum (C), colonic mucosa  (D)      Gross Description         GROSS DESCRIPTION:  In 4 containers, each of these show mucosal biopsies measuring up to 0.3  cm in greatest dimension.  Embedded accordingly.  (A) small bowel, (B)  gastric mucosa, (C) terminal ileum, (D) colonic mucosa.      Final Diagnosis         FINAL DIAGNOSIS:  A.  SMALL BOWEL, MUCOSAL BIOPSY:             NO SIGNIFICANT PATHOLOGIC DIAGNOSIS.  B.  GASTRIC MUCOSAL BIOPSY:             MILD CHRONIC INFLAMMATION.             NO EVIDENCE OF HELICOBACTER PYLORI (IP STAIN PERFORMED).  C.  TERMINAL ILEUM, MUCOSAL BIOPSY:             NO SIGNIFICANT PATHOLOGIC DIAGNOSIS.  D.  COLONIC MUCOSAL BIOPSY, RANDOM:             NO SIGNIFICANT PATHOLOGIC DIAGNOSIS.      Comment         COMMENT:  Helicobacter immunoperoxidase stain performed on block B.  HP  "immunostain was developed and its performance characteristics  determined by [facility].  It has not been  cleared or approved by the U.S. Food and Drug Administration.  The FDA  has determined that such clearance or approval is not necessary.  This  test is used for clinical purposes.  It should not be regarded as  investigational or for research.  This laboratory is certified under the  Clinical Laboratory Improvement Amendments of 1988 (CLIA-88) as  qualified to perform high complexity clinical laboratory testing.      CONVERTED (HISTORICAL) FINAL PATHOLOGIST       Diagnostician:  BESSIE ESTES M.D.  Pathologist  Electronically Signed 12/13/2011      Diagnosis Code   DIAGNOSIS CODE:  4      Results for orders placed or performed in visit on 11/25/03   Converted Surgical Pathology    Specimen: Tissue   Result Value Ref Range    Spec Descr 1 SPECIMEN(S): A GASTRIC BIOPSY     Gross Description         GROSS DESCRIPTION:  The specimen is labeled \"gastric mucosa\" and consists of two tan  fragments measuring 0.8 x 0.2 x 0.3 cm together.  Totally submitted.      Final Diagnosis         FINAL DIAGNOSIS:  GASTRIC MUCOSA, BIOPSY:            CHRONIC GASTRITIS SHOWING MINIMAL ACTIVITY.            A SPECIAL STAIN FOR HELICOBACTER PYLORI IS NEGATIVE.  pgl    DIAGNOSIS CODE:  6      Comment   CLINICAL DIAGNOSIS:  Gastric mucosa biopsy       CONVERTED (HISTORICAL) FINAL PATHOLOGIST       Diagnostician:  JOY ALVARADO M.D.  Pathologist  Electronically Signed 11/28/2003     Results for orders placed or performed in visit on 04/01/03   Converted Surgical Pathology    Specimen: Tissue   Result Value Ref Range    Spec Descr 1 SPECIMEN(S): A MUCOSAL COLON BIOPSY     Gross Description         GROSS DESCRIPTION:  The container is labeled \"mucosa, colon (cold biopsy)\" and has nodular  bits of white soft tissue 0.6 cc in aggregate.  The entire specimen is  embedded.      Final Diagnosis         FINAL " DIAGNOSIS:  MUCOSA, COLON:              FRAGMENTS OF MUCOSA OF THE COLON WITH A FEW BENIGN                   LYMPHOID NODULES.  [facility]    DIAGNOSIS CODE:  2      Comment   CLINICAL DIAGNOSIS:  Diarrhea       CONVERTED (HISTORICAL) FINAL PATHOLOGIST       Diagnostician:  ALVARO HEREDIA M.D.  Pathologist  Electronically Signed 04/02/2003

## 2021-12-07 NOTE — H&P (VIEW-ONLY)
Chief Complaint   Patient presents with   • Abdominal Pain   • Anemia       ENDO PROCEDURE ORDERED: EGD/COLON fe def, abd pain, gerd, ibs    Subjective    Sravanthi Ellison is a 61 y.o. female. she is being seen for consultation today at the request of No ref. provider found    History of Present Illness patient with iron deficiency anemia abdominal pain and GERD we will schedule patient for EGD and colonoscopy         The following portions of the patient's history were reviewed and updated as appropriate:   Past Medical History:   Diagnosis Date   • Diabetes mellitus (HCC)    • Diarrhea    • Diverticular disease of colon    • Gastroesophageal reflux disease    • Hiatal hernia    • Irritable bowel syndrome    • Nausea    • Right upper quadrant pain    • Traumatic brain injury (HCC) 06/2017     Past Surgical History:   Procedure Laterality Date   • CHOLECYSTECTOMY     • COLONOSCOPY  12/12/2011    Mild diverticulosis in sigmoid colon. Stool collected for study.   • COLONOSCOPY N/A 12/6/2017    Procedure: COLONOSCOPY--look TI, bx;  Surgeon: Marshal Staley MD;  Location: [facility] ENDOSCOPY;  Service:    • ENDOSCOPY N/A 12/6/2017    Procedure: ESOPHAGOGASTRODUODENOSCOPY--small bowel biopsies;  Surgeon: Marshal Staley MD;  Location: [facility] ENDOSCOPY;  Service:    • ENDOSCOPY W/ PEG TUBE PLACEMENT  12/12/2011    Normal hypopharynx, esophagus, symmetrical & patent pylorus. Hiatus hernia in GE junction. Non-erosive gastritis in stomach. Multiple biopsies taken. Normal duodenum. Multiple biopsies taken.   • UPPER GASTROINTESTINAL ENDOSCOPY  12/12/2011   • UPPER GASTROINTESTINAL ENDOSCOPY  12/06/2017     Family History   Problem Relation Age of Onset   • Breast cancer Other    • Diabetes Other    • Heart disease Other    • Hypertension Other    • Skin cancer Other      OB History    No obstetric history on file.       Allergies   Allergen Reactions   • Other GI Intolerance     Regulo gal     Social History     Socioeconomic  History   • Marital status:    Tobacco Use   • Smoking status: Never Smoker   • Smokeless tobacco: Never Used   Vaping Use   • Vaping Use: Never used   Substance and Sexual Activity   • Alcohol use: No   • Drug use: No   • Sexual activity: Defer     Current Medications:  Prior to Admission medications    Medication Sig Start Date End Date Taking? Authorizing Provider   azelastine (ASTELIN) 0.1 % nasal spray 2 sprays into the nostril(s) as directed by provider 2 (Two) Times a Day. Use in each nostril as directed   Yes Vishal Hsu MD   busPIRone (BUSPAR) 7.5 MG tablet Take 7.5 mg by mouth Every Night. 3/29/18  Yes Vishal Hsu MD   Cyanocobalamin (VITAMIN B-12 ER PO) Take  by mouth.   Yes Vishal Hsu MD   DULoxetine (CYMBALTA) 30 MG capsule take 1 tablet by mouth every morning for anxiety depression 12/20/17  Yes Vishal Hsu MD   Empagliflozin (JARDIANCE) 25 MG tablet Take 25 mg by mouth Daily.   Yes Vishal Hsu MD   fluticasone (FLONASE) 50 MCG/ACT nasal spray INSTILL 2 SPRAYS IN EACH NOSTRIL DAILY 3/11/21  Yes Osiel Menon MD   hydrOXYzine (ATARAX) 10 MG tablet Take 10 mg by mouth 3 (Three) Times a Day As Needed for Itching.   Yes ProviderVishal MD   magnesium oxide (MAG-OX) 400 MG tablet Take 400 mg by mouth Daily. 6/10/21 6/11/22 Yes Vishal Hsu MD   MELATONIN PO Take 3 mg by mouth Every Night.   Yes ProviderVishal MD   meloxicam (MOBIC) 15 MG tablet Take 15 mg by mouth Daily.   Yes ProviderVishal MD   metFORMIN (GLUCOPHAGE) 1000 MG tablet Take 1,000 mg by mouth 2 (Two) Times a Day With Meals.   Yes Vishal Hsu MD   montelukast (SINGULAIR) 10 MG tablet Take 1 tablet by mouth Daily. 3/19/21  Yes Osiel Menon MD   omeprazole (priLOSEC) 40 MG capsule Take 40 mg by mouth Daily. 1/8/18  Yes Vishal Hsu MD   pioglitazone (ACTOS) 30 MG tablet Take 30 mg by mouth Daily.   Yes Shadi  "MD Vishal   pravastatin (PRAVACHOL) 20 MG tablet Take 20 mg by mouth Daily.   Yes Provider, MD Vishal   Semaglutide (OZEMPIC SC) Inject  under the skin into the appropriate area as directed.   Yes ProviderVishal MD   cetirizine (zyrTEC) 10 MG tablet TAKE 1 TABLET BY MOUTH DAILY 3/8/21   Osiel Menon MD   hyoscyamine (LEVBID) 0.375 MG 12 hr tablet Take 1 tablet by mouth 2 (Two) Times a Day. 6/26/18 12/7/21  Abel Laboy PA-C     Review of Systems  Review of Systems   Constitutional: Negative for unexpected weight change.   HENT: Positive for trouble swallowing.    Gastrointestinal: Positive for abdominal pain, diarrhea, nausea and vomiting. Negative for abdominal distention, anal bleeding, blood in stool, constipation and rectal pain.          Objective    /69   Pulse 85   Ht 167.6 cm (66\")   Wt 63.5 kg (140 lb)   BMI 22.60 kg/m²   Physical Exam  Vitals and nursing note reviewed.   Constitutional:       General: She is not in acute distress.     Appearance: She is well-developed. She is ill-appearing.   HENT:      Head: Normocephalic and atraumatic.   Eyes:      Pupils: Pupils are equal, round, and reactive to light.   Cardiovascular:      Rate and Rhythm: Normal rate and regular rhythm.      Heart sounds: Normal heart sounds.   Pulmonary:      Effort: Pulmonary effort is normal.      Breath sounds: Normal breath sounds.   Abdominal:      General: Bowel sounds are normal. There is no distension or abdominal bruit.      Palpations: Abdomen is soft. Abdomen is not rigid. There is no shifting dullness or mass.      Tenderness: There is abdominal tenderness. There is no guarding or rebound.      Hernia: No hernia is present. There is no hernia in the ventral area.   Musculoskeletal:         General: Normal range of motion.      Cervical back: Normal range of motion.   Skin:     General: Skin is warm and dry.   Neurological:      Mental Status: She is alert and oriented to " person, place, and time.   Psychiatric:         Behavior: Behavior normal.         Thought Content: Thought content normal.         Judgment: Judgment normal.       Assessment/Plan      1. Other iron deficiency anemia    2. Folate deficiency    3. Diarrhea, unspecified type    4. Pain of upper abdomen    5. Epigastric pain    6. Dysphagia, unspecified type    .   Diagnoses and all orders for this visit:    1. Other iron deficiency anemia (Primary)  -     Case Request; Standing  -     COVID PRE-OP / PRE-PROCEDURE SCREENING ORDER (NO ISOLATION) - Swab, Nasopharynx; Future    2. Folate deficiency  -     Case Request; Standing  -     COVID PRE-OP / PRE-PROCEDURE SCREENING ORDER (NO ISOLATION) - Swab, Nasopharynx; Future    3. Diarrhea, unspecified type  -     Case Request; Standing  -     COVID PRE-OP / PRE-PROCEDURE SCREENING ORDER (NO ISOLATION) - Swab, Nasopharynx; Future    4. Pain of upper abdomen  -     Case Request; Standing  -     COVID PRE-OP / PRE-PROCEDURE SCREENING ORDER (NO ISOLATION) - Swab, Nasopharynx; Future    5. Epigastric pain  -     Case Request; Standing  -     COVID PRE-OP / PRE-PROCEDURE SCREENING ORDER (NO ISOLATION) - Swab, Nasopharynx; Future    6. Dysphagia, unspecified type  -     Case Request; Standing  -     COVID PRE-OP / PRE-PROCEDURE SCREENING ORDER (NO ISOLATION) - Swab, Nasopharynx; Future    Other orders  -     folic acid (FOLVITE) 1 MG tablet; Take 1 tablet by mouth Daily.  Dispense: 30 tablet; Refill: 3  -     ferrous sulfate 325 (65 FE) MG EC tablet; Take 1 tablet by mouth Daily With Breakfast.  Dispense: 30 tablet; Refill: 3  -     lansoprazole (PREVACID) 30 MG capsule; Take 1 capsule by mouth Daily.  Dispense: 30 capsule; Refill: 3  -     Follow Anesthesia Guidelines / Standing Orders; Future  -     Obtain Informed Consent; Future        Orders placed during this encounter include:  Orders Placed This Encounter   Procedures   • COVID PRE-OP / PRE-PROCEDURE SCREENING ORDER (NO  ISOLATION) - Swab, Nasopharynx     Standing Status:   Future     Standing Expiration Date:   12/7/2022     Order Specific Question:   Release to patient     Answer:   Immediate     Order Specific Question:   Please select your location:     Answer:   [facility]     Order Specific Question:   COVID Screening Order:     Answer:   In-House: PRE-OP: BD MAX, 8-10 HR TAT [[id number]]     Order Specific Question:   Previously tested for COVID-19?     Answer:   No     Order Specific Question:   Employed in healthcare setting?     Answer:   No     Order Specific Question:   Symptomatic for COVID-19 as defined by CDC?     Answer:   No     Order Specific Question:   Hospitalized for COVID-19?     Answer:   No     Order Specific Question:   Admitted to ICU for COVID-19?     Answer:   No     Order Specific Question:   Resident in a congregate (group) care setting?     Answer:   No     Order Specific Question:   Pregnant?     Answer:   No   • Follow Anesthesia Guidelines / Standing Orders     Standing Status:   Future   • Obtain Informed Consent     Standing Status:   Future     Order Specific Question:   Informed Consent Given For     Answer:   ESOPHAGOGASTRODUODENOSCOPY and colonoscopy       Medications prescribed:  New Medications Ordered This Visit   Medications   • folic acid (FOLVITE) 1 MG tablet     Sig: Take 1 tablet by mouth Daily.     Dispense:  30 tablet     Refill:  3   • ferrous sulfate 325 (65 FE) MG EC tablet     Sig: Take 1 tablet by mouth Daily With Breakfast.     Dispense:  30 tablet     Refill:  3   • lansoprazole (PREVACID) 30 MG capsule     Sig: Take 1 capsule by mouth Daily.     Dispense:  30 capsule     Refill:  3     Discontinued Medications       Reason for Discontinue     hyoscyamine (LEVBID) 0.375 MG 12 hr tablet    *Therapy completed        Requested Prescriptions     Signed Prescriptions Disp Refills   • folic acid (FOLVITE) 1 MG tablet 30 tablet 3     Sig: Take 1 tablet by mouth Daily.   • ferrous  sulfate 325 (65 FE) MG EC tablet 30 tablet 3     Sig: Take 1 tablet by mouth Daily With Breakfast.   • lansoprazole (PREVACID) 30 MG capsule 30 capsule 3     Sig: Take 1 capsule by mouth Daily.       Review and/or summary of lab tests, radiology, procedures, medications. Review and summary of old records and obtaining of history. The risks and benefits of my recommendations, as well as other treatment options were discussed with the patient today. Questions were answered.    Follow-up: Return in about 6 weeks (around 1/18/2022), or if symptoms worsen or fail to improve.     ESOPHAGOGASTRODUODENOSCOPY WITH DILATATION--bx (N/A), COLONOSCOPY (N/A)      This document has been electronically signed by Abel Laboy PA-C on December 7, 2021 17:26 CST      Results for orders placed or performed in visit on 05/01/18   Alpha - Gal Panel    Specimen: Blood   Result Value Ref Range    Beef 0.16 <0.35 kU/L    Class Description 0/1     Lamb <0.10 <0.35 kU/L    Class Interpretation 0     Pork <0.10 <0.35 kU/L    Class Interpretation 0     Alpha Gal IgE 1.34 (H) <0.35 kU/L   Results for orders placed or performed during the hospital encounter of 12/06/17   Tissue Pathology Exam - Tissue, Small Intestine, Duodenum    Specimen: A: Small Intestine, Duodenum; Tissue    B: Gastric, Antrum; Tissue    C: Small Intestine, Ileum; Tissue    D: Large Intestine; Tissue   Result Value Ref Range    Case Report       Surgical Pathology Report                         Case: [id number]                                  Authorizing Provider:  Marshal Staley MD         Collected:           12/06/2017 10:50 AM          Ordering Location:     [facility]             Received:            12/06/2017 12:01 PM                                 [facility]                                                     Pathologist:           Jared Kelley MD                                                          Specimens:   1) - Small Intestine,  Duodenum, small bowel bx                                                      2) - Gastric, Antrum, antrum bx                                                                     3) - Small Intestine, Ileum, ti bx                                                                  4) - Large Intestine, colonic mucosa bx                                                    Final Diagnosis       1.  MUCOSA, DUODENUM:  NO SIGNIFICANT HISTOLOGIC ABNORMALITY.    2.  MUCOSA, ANTRUM OF STOMACH:  CHRONIC GASTRITIS WITH BENIGN LYMPHOID AGGREGATES AND CONGESTION.  NEGATIVE FOR HELICOBACTER PYLORI (HP IMMUNOSTAIN).    3.  MUCOSA, TERMINAL ILEUM:  NO SIGNIFICANT HISTOLOGIC ABNORMALITY.    4.  MUCOSA, COLON:  NO SIGNIFICANT HISTOLOGIC ABNORMALITY.      Comment       Helicobacter pylori (HP) immunostain is performed because an appropriate inflammatory milieu is present and organisms are not seen on H & E stained slides.    HP immunostain was developed and [facility].  It has not been cleared or approved by the U.S. Food and Drug Administration.  The FDA has determined that such clearance or approval is not necessary.  This test is used for clinical purposes.  It should not be regarded as investigational or for research.  This laboratory is certified under the Clinical Laboratory Improvement Amendments of 1988 (CLIA-88) as qualified to perform high complexity clinical laboratory testing.            Gross Description       Received for examination are 4 containers, each of which have nodular bits of white soft tissue measuring 0.3-0.5 cc in aggregate.  All specimens are embedded as labeled.  1A duodenum; 2A antrum of stomach; 3A terminal ileum; 4A mucosa of colon.      Embedded Images     POC Glucose Fingerstick    Specimen: Blood   Result Value Ref Range    Glucose 81 70 - 130 mg/dL   Results for orders placed or performed in visit on 11/28/17    Gastrointestinal Panel, PCR - Stool, Per Rectum    Specimen: Per Rectum; Stool   Result Value Ref Range    Campylobacter Not Detected Not Detected    Clostridium difficile (toxin A/B) Not Detected Not Detected, NA formed stool, C diff not applicable on patient less than one year of age    Plesiomonas shigelloides Not Detected Not Detected    Salmonella Not Detected Not Detected    Vibrio Not Detected Not Detected    Vibrio cholerae Not Detected Not Detected    Yersinia enterocolitica Not Detected Not Detected    Enteroaggregative E. coli (EAEC) Not Detected Not Detected    Enteropathogenic E. coli (EPEC) Not Detected Not Detected    Enterotoxigenic E. coli (ETEC) lt/st Not Detected Not Detected    Shiga-like toxin-producing E. coli (STEC) stx1/stx2 Not Detected Not Detected    E. coli O157 Not Detected Not Detected    Shigella/Enteroinvasive E. coli (EIEC) Not Detected Not Detected    Cryptosporidium Not Detected Not Detected    Cyclospora cayetanensis Not Detected Not Detected    Entamoeba histolytica Not Detected Not Detected    Giardia lamblia Not Detected Not Detected    Adenovirus F40/41 Not Detected Not Detected    Astrovirus Not Detected Not Detected    Norovirus GI/GII Not Detected Not Detected    Rotavirus A Not Detected Not Detected    Sapovirus (I, II, IV or V) Not Detected Not Detected   Glia(IgA / G) & TTG(IgA / G)    Specimen: Blood   Result Value Ref Range    Gliadin Deamidated Peptide Ab, IgA 5 0 - 19 units    Deaminated Gliadin Ab IgG 4 0 - 19 units    Tissue Transglutaminase IgA <2 0 - 3 U/mL    Tissue Transglutaminase IgG <2 0 - 5 U/mL   Allergens (12) Foods    Specimen: Blood   Result Value Ref Range    Class Description Comment     Egg White <0.10 Class 0 kU/L    Milk, Cow's <0.10 Class 0 kU/L    CodFish <0.10 Class 0 kU/L    Sesame Seed <0.10 Class 0 kU/L    Peanut <0.10 Class 0 kU/L    Soybean <0.10 Class 0 kU/L    Hazelnut <0.10 Class 0 kU/L    Shrimp <0.10 Class 0 kU/L    Scallop <0.10  Class 0 kU/L    Gluten <0.10 Class 0 kU/L    [place] <0.10 Class 0 kU/L    Wheat <0.10 Class 0 kU/L   Trypsin    Specimen: Blood   Result Value Ref Range    Trypsin 471 169 - 773 ng/mL   Lipase    Specimen: Blood   Result Value Ref Range    Lipase 104 23 - 300 U/L   Amylase    Specimen: Blood   Result Value Ref Range    Amylase 60 25 - 140 U/L   Results for orders placed or performed in visit on 11/07/16    COLONOSCOPY   Result Value Ref Range     Colonoscopy completed; ordered by Dr. Ijeoma Joseph    Results for orders placed or performed in visit on 12/12/11   Converted Surgical Pathology    Specimen: Tissue   Result Value Ref Range    Spec Descr 1 SPECIMEN(S): A SMALL BOWEL BIOPSY     Specimen description 2 SPECIMEN(S): B GASTRIC BIOPSY     Specimen description 3 SPECIMEN(S): C TERMINAL ILEUM BIOPSY     Specimen description 4 SPECIMEN(S): D MUCOSAL COLON BIOPSY     Preoperative Diagnosis   PREOPERATIVE DIAGNOSIS:  Diarrhea       Postoperative Diagnosis         POSTOPERATIVE DIAGNOSIS:  Small bowel (A), gastric mucosa (B), terminal ileum (C), colonic mucosa  (D)      Gross Description         GROSS DESCRIPTION:  In 4 containers, each of these show mucosal biopsies measuring up to 0.3  cm in greatest dimension.  Embedded accordingly.  (A) small bowel, (B)  gastric mucosa, (C) terminal ileum, (D) colonic mucosa.      Final Diagnosis         FINAL DIAGNOSIS:  A.  SMALL BOWEL, MUCOSAL BIOPSY:             NO SIGNIFICANT PATHOLOGIC DIAGNOSIS.  B.  GASTRIC MUCOSAL BIOPSY:             MILD CHRONIC INFLAMMATION.             NO EVIDENCE OF HELICOBACTER PYLORI (IP STAIN PERFORMED).  C.  TERMINAL ILEUM, MUCOSAL BIOPSY:             NO SIGNIFICANT PATHOLOGIC DIAGNOSIS.  D.  COLONIC MUCOSAL BIOPSY, RANDOM:             NO SIGNIFICANT PATHOLOGIC DIAGNOSIS.      Comment         COMMENT:  Helicobacter immunoperoxidase stain performed on block B.  HP immunostain was developed and its performance characteristics  determined by  "[facility].  It has not been  cleared or approved by the U.S. Food and Drug Administration.  The FDA  has determined that such clearance or approval is not necessary.  This  test is used for clinical purposes.  It should not be regarded as  investigational or for research.  This laboratory is certified under the  Clinical Laboratory Improvement Amendments of 1988 (CLIA-88) as  qualified to perform high complexity clinical laboratory testing.      CONVERTED (HISTORICAL) FINAL PATHOLOGIST       Diagnostician:  BESSIE ESTES M.D.  Pathologist  Electronically Signed 12/13/2011      Diagnosis Code   DIAGNOSIS CODE:  4      Results for orders placed or performed in visit on 11/25/03   Converted Surgical Pathology    Specimen: Tissue   Result Value Ref Range    Spec Descr 1 SPECIMEN(S): A GASTRIC BIOPSY     Gross Description         GROSS DESCRIPTION:  The specimen is labeled \"gastric mucosa\" and consists of two tan  fragments measuring 0.8 x 0.2 x 0.3 cm together.  Totally submitted.      Final Diagnosis         FINAL DIAGNOSIS:  GASTRIC MUCOSA, BIOPSY:            CHRONIC GASTRITIS SHOWING MINIMAL ACTIVITY.            A SPECIAL STAIN FOR HELICOBACTER PYLORI IS NEGATIVE.  pgl    DIAGNOSIS CODE:  6      Comment   CLINICAL DIAGNOSIS:  Gastric mucosa biopsy       CONVERTED (HISTORICAL) FINAL PATHOLOGIST       Diagnostician:  JOY ALVARADO M.D.  Pathologist  Electronically Signed 11/28/2003     Results for orders placed or performed in visit on 04/01/03   Converted Surgical Pathology    Specimen: Tissue   Result Value Ref Range    Spec Descr 1 SPECIMEN(S): A MUCOSAL COLON BIOPSY     Gross Description         GROSS DESCRIPTION:  The container is labeled \"mucosa, colon (cold biopsy)\" and has nodular  bits of white soft tissue 0.6 cc in aggregate.  The entire specimen is  embedded.      Final Diagnosis         FINAL DIAGNOSIS:  MUCOSA, COLON:              FRAGMENTS OF MUCOSA OF THE COLON WITH A " FEW BENIGN                   LYMPHOID NODULES.  [facility]    DIAGNOSIS CODE:  2      Comment   CLINICAL DIAGNOSIS:  Diarrhea       CONVERTED (HISTORICAL) FINAL PATHOLOGIST       Diagnostician:  ALVARO HEREDIA M.D.  Pathologist  Electronically Signed 04/02/2003

## 2021-12-07 NOTE — PATIENT INSTRUCTIONS
MyPlate from USDA    MyPlate is an outline of a general healthy diet based on the 2010 Dietary Guidelines for Americans, from the U.[place] Department of Agriculture (USDA). It sets guidelines for how much food you should eat from each food group based on your age, sex, and level of physical activity.  What are tips for following MyPlate?  To follow MyPlate recommendations:  · Eat a wide variety of fruits and vegetables, grains, and protein foods.  · Serve smaller portions and eat less food throughout the day.  · Limit portion sizes to avoid overeating.  · Enjoy your food.  · Get at least 150 minutes of exercise every week. This is about 30 minutes each day, 5 or more days per week.  It can be difficult to have every meal look like MyPlate. Think about MyPlate as eating guidelines for an entire day, rather than each individual meal.  Fruits and vegetables  · Make half of your plate fruits and vegetables.  · Eat many different colors of fruits and vegetables each day.  · For a 2,000 calorie daily food plan, eat:  ? 2½ cups of vegetables every day.  ? 2 cups of fruit every day.  · 1 cup is equal to:  ? 1 cup raw or cooked vegetables.  ? 1 cup raw fruit.  ? 1 medium-sized orange, apple, or banana.  ? 1 cup 100% fruit or vegetable juice.  ? 2 cups raw leafy greens, such as lettuce, spinach, or kale.  ? ½ cup dried fruit.  Grains  · One fourth of your plate should be grains.  · Make at least half of the grains you eat each day whole grains.  · For a 2,000 calorie daily food plan, eat 6 oz of grains every day.  · 1 oz is equal to:  ? 1 slice bread.  ? 1 cup cereal.  ? ½ cup cooked rice, cereal, or pasta.  Protein  · One fourth of your plate should be protein.  · Eat a wide variety of protein foods, including meat, poultry, fish, eggs, beans, nuts, and tofu.  · For a 2,000 calorie daily food plan, eat 5½ oz of protein every day.  · 1 oz is equal to:  ? 1 oz meat, poultry, or fish.  ? ¼ cup cooked beans.  ? 1 egg.  ? ½ oz  nuts or seeds.  ? 1 Tbsp peanut butter.  Dairy  · Drink fat-free or low-fat (1%) milk.  · Eat or drink dairy as a side to meals.  · For a 2,000 calorie daily food plan, eat or drink 3 cups of dairy every day.  · 1 cup is equal to:  ? 1 cup milk, yogurt, cottage cheese, or soy milk (soy beverage).  ? 2 oz processed cheese.  ? 1½ oz natural cheese.  Fats, oils, salt, and sugars  · Only small amounts of oils are recommended.  · Avoid foods that are high in calories and low in nutritional value (empty calories), like foods high in fat or added sugars.  · Choose foods that are low in salt (sodium). Choose foods that have less than 140 milligrams (mg) of sodium per serving.  · Drink water instead of sugary drinks. Drink enough water each day to keep your urine pale yellow.  Where to find support  · Work with your health care provider or a nutrition specialist (dietitian) to develop a customized eating plan that is right for you.  · Download an pablo (mobile application) to help you track your daily food intake.  Where to find more information  · Go to ChooseMyPlate.gov for more information.  Summary  · MyPlate is a general guideline for healthy eating from the USDA. It is based on the 2010 Dietary Guidelines for Americans.  · In general, fruits and vegetables should take up ½ of your plate, grains should take up ¼ of your plate, and protein should take up ¼ of your plate.  This information is not intended to replace advice given to you by your health care provider. Make sure you discuss any questions you have with your health care provider.  Document Revised: 05/21/2020 Document Reviewed: 03/19/2018  [employer] Patient Education © 2021 [employer]  BMI for Adults  What is BMI?  Body mass index (BMI) is a number that is calculated from a person's weight and height. BMI can help estimate how much of a person's weight is composed of fat. BMI does not measure body fat directly. Rather, it is an alternative to procedures that  "directly measure body fat, which can be difficult and expensive.  BMI can help identify people who may be at higher risk for certain medical problems.  What are BMI measurements used for?  BMI is used as a screening tool to identify possible weight problems. It helps determine whether a person is obese, overweight, a healthy weight, or underweight.  BMI is useful for:  · Identifying a weight problem that may be related to a medical condition or may increase the risk for medical problems.  · Promoting changes, such as changes in diet and exercise, to help reach a healthy weight. BMI screening can be repeated to see if these changes are working.  How is BMI calculated?  BMI involves measuring your weight in relation to your height. Both height and weight are measured, and the BMI is calculated from those numbers. This can be done either in English ([place] or metric measurements. Note that charts and online BMI calculators are available to help you find your BMI quickly and easily without having to do these calculations yourself.  To calculate your BMI in English ([place] measurements:    1. Measure your weight in pounds (lb).  2. Multiply the number of pounds by 703.  ? For example, for a person who weighs 180 lb, multiply that number by 703, which equals 126,540.  3. Measure your height in inches. Then multiply that number by itself to get a measurement called \"inches squared.\"  ? For example, for a person who is 70 inches tall, the \"inches squared\" measurement is 70 inches x 70 inches, which equals 4,900 inches squared.  4. Divide the total from step 2 (number of lb x 703) by the total from step 3 (inches squared): 126,540 ÷ 4,900 = 25.8. This is your BMI.    To calculate your BMI in metric measurements:  1. Measure your weight in kilograms (kg).  2. Measure your height in meters (m). Then multiply that number by itself to get a measurement called \"meters squared.\"  ? For example, for a person who is 1.75 m tall, the " "\"meters squared\" measurement is 1.75 m x 1.75 m, which is equal to 3.1 meters squared.  3. Divide the number of kilograms (your weight) by the meters squared number. In this example: 70 ÷ 3.1 = 22.6. This is your BMI.  What do the results mean?  BMI charts are used to identify whether you are underweight, normal weight, overweight, or obese. The following guidelines will be used:  · Underweight: BMI less than 18.5.  · Normal weight: BMI between 18.5 and 24.9.  · Overweight: BMI between 25 and 29.9.  · Obese: BMI of 30 or above.  Keep these notes in mind:  · Weight includes both fat and muscle, so someone with a muscular build, such as an athlete, may have a BMI that is higher than 24.9. In cases like these, BMI is not an accurate measure of body fat.  · To determine if excess body fat is the cause of a BMI of 25 or higher, further assessments may need to be done by a health care provider.  · BMI is usually interpreted in the same way for men and women.  Where to find more information  For more information about BMI, including tools to quickly calculate your BMI, go to these websites:  · Centers for Disease Control and Prevention: www.cdc.gov  · American Heart Association: www.heart.org  · National Heart, Lung, and [employer] [place]: www.nhlbi.nih.gov  Summary  · Body mass index (BMI) is a number that is calculated from a person's weight and height.  · BMI may help estimate how much of a person's weight is composed of fat. BMI can help identify those who may be at higher risk for certain medical problems.  · BMI can be measured using English measurements or metric measurements.  · BMI charts are used to identify whether you are underweight, normal weight, overweight, or obese.  This information is not intended to replace advice given to you by your health care provider. Make sure you discuss any questions you have with your health care provider.  Document Revised: 09/09/2020 Document Reviewed: 07/17/2020  [employer] " Patient Education © 2021 [employer]

## 2021-12-08 PROBLEM — R10.10 PAIN OF UPPER ABDOMEN: Status: ACTIVE | Noted: 2021-12-08

## 2021-12-08 PROBLEM — E53.8 FOLATE DEFICIENCY: Status: ACTIVE | Noted: 2021-12-08

## 2021-12-08 PROBLEM — D64.9 ABSOLUTE ANEMIA: Status: ACTIVE | Noted: 2021-12-08

## 2021-12-08 PROBLEM — R10.13 EPIGASTRIC PAIN: Status: ACTIVE | Noted: 2021-12-08

## 2021-12-08 PROBLEM — R13.10 DYSPHAGIA: Status: ACTIVE | Noted: 2021-12-08

## 2021-12-09 RX ORDER — MONTELUKAST SODIUM 10 MG/1
10 TABLET ORAL DAILY
Qty: 90 TABLET | Refills: 3 | Status: SHIPPED | OUTPATIENT
Start: 2021-12-09 | End: 2022-03-04 | Stop reason: SDUPTHER

## 2021-12-14 ENCOUNTER — TELEPHONE (OUTPATIENT)
Dept: GASTROENTEROLOGY | Facility: CLINIC | Age: 61
End: 2021-12-14

## 2021-12-14 NOTE — TELEPHONE ENCOUNTER
Patient has been contacted and made aware that her lansoprazole has been approved through her insurance company. Approved from 12/10/21 to 12/9/2024

## 2021-12-17 RX ORDER — OMEPRAZOLE 40 MG/1
40 CAPSULE, DELAYED RELEASE ORAL DAILY
COMMUNITY
End: 2023-03-02

## 2021-12-20 ENCOUNTER — LAB (OUTPATIENT)
Dept: LAB | Facility: HOSPITAL | Age: 61
End: 2021-12-20

## 2021-12-20 DIAGNOSIS — E53.8 FOLATE DEFICIENCY: ICD-10-CM

## 2021-12-20 DIAGNOSIS — R10.13 EPIGASTRIC PAIN: ICD-10-CM

## 2021-12-20 DIAGNOSIS — R10.10 PAIN OF UPPER ABDOMEN: ICD-10-CM

## 2021-12-20 DIAGNOSIS — D50.8 OTHER IRON DEFICIENCY ANEMIA: ICD-10-CM

## 2021-12-20 DIAGNOSIS — R19.7 DIARRHEA, UNSPECIFIED TYPE: ICD-10-CM

## 2021-12-20 DIAGNOSIS — R13.10 DYSPHAGIA, UNSPECIFIED TYPE: ICD-10-CM

## 2021-12-20 LAB — SARS-COV-2 N GENE RESP QL NAA+PROBE: NOT DETECTED

## 2021-12-20 PROCEDURE — 87635 SARS-COV-2 COVID-19 AMP PRB: CPT

## 2021-12-20 PROCEDURE — C9803 HOPD COVID-19 SPEC COLLECT: HCPCS

## 2021-12-22 ENCOUNTER — HOSPITAL ENCOUNTER (OUTPATIENT)
Facility: HOSPITAL | Age: 61
Setting detail: HOSPITAL OUTPATIENT SURGERY
Discharge: HOME | End: 2021-12-22
Attending: INTERNAL MEDICINE | Admitting: INTERNAL MEDICINE

## 2021-12-22 ENCOUNTER — ANESTHESIA (OUTPATIENT)
Dept: GASTROENTEROLOGY | Facility: HOSPITAL | Age: 61
End: 2021-12-22

## 2021-12-22 ENCOUNTER — ANESTHESIA EVENT (OUTPATIENT)
Dept: GASTROENTEROLOGY | Facility: HOSPITAL | Age: 61
End: 2021-12-22

## 2021-12-22 VITALS
HEART RATE: 89 BPM | OXYGEN SATURATION: 99 % | TEMPERATURE: 96.6 F | HEIGHT: 66 IN | DIASTOLIC BLOOD PRESSURE: 58 MMHG | BODY MASS INDEX: 21.93 KG/M2 | WEIGHT: 136.47 LBS | SYSTOLIC BLOOD PRESSURE: 100 MMHG | RESPIRATION RATE: 18 BRPM

## 2021-12-22 DIAGNOSIS — R13.10 DYSPHAGIA, UNSPECIFIED TYPE: ICD-10-CM

## 2021-12-22 DIAGNOSIS — R10.10 PAIN OF UPPER ABDOMEN: ICD-10-CM

## 2021-12-22 DIAGNOSIS — R19.7 DIARRHEA, UNSPECIFIED TYPE: ICD-10-CM

## 2021-12-22 DIAGNOSIS — E53.8 FOLATE DEFICIENCY: ICD-10-CM

## 2021-12-22 DIAGNOSIS — R10.13 EPIGASTRIC PAIN: ICD-10-CM

## 2021-12-22 DIAGNOSIS — D50.8 OTHER IRON DEFICIENCY ANEMIA: ICD-10-CM

## 2021-12-22 LAB — GLUCOSE BLDC GLUCOMTR-MCNC: 79 MG/DL (ref 70–130)

## 2021-12-22 PROCEDURE — 25010000002 PROPOFOL 10 MG/ML EMULSION: Performed by: NURSE ANESTHETIST, CERTIFIED REGISTERED

## 2021-12-22 PROCEDURE — 43239 EGD BIOPSY SINGLE/MULTIPLE: CPT | Performed by: INTERNAL MEDICINE

## 2021-12-22 PROCEDURE — 45380 COLONOSCOPY AND BIOPSY: CPT | Performed by: INTERNAL MEDICINE

## 2021-12-22 PROCEDURE — 25010000002 FENTANYL CITRATE (PF) 50 MCG/ML SOLUTION: Performed by: NURSE ANESTHETIST, CERTIFIED REGISTERED

## 2021-12-22 PROCEDURE — 25010000002 MIDAZOLAM PER 1 MG: Performed by: NURSE ANESTHETIST, CERTIFIED REGISTERED

## 2021-12-22 PROCEDURE — 82962 GLUCOSE BLOOD TEST: CPT

## 2021-12-22 RX ORDER — FENTANYL CITRATE 50 UG/ML
INJECTION, SOLUTION INTRAMUSCULAR; INTRAVENOUS AS NEEDED
Status: DISCONTINUED | OUTPATIENT
Start: 2021-12-22 | End: 2021-12-22 | Stop reason: SURG

## 2021-12-22 RX ORDER — MIDAZOLAM HYDROCHLORIDE 1 MG/ML
INJECTION INTRAMUSCULAR; INTRAVENOUS AS NEEDED
Status: DISCONTINUED | OUTPATIENT
Start: 2021-12-22 | End: 2021-12-22 | Stop reason: SURG

## 2021-12-22 RX ORDER — LIDOCAINE HYDROCHLORIDE 20 MG/ML
INJECTION, SOLUTION INTRAVENOUS AS NEEDED
Status: DISCONTINUED | OUTPATIENT
Start: 2021-12-22 | End: 2021-12-22 | Stop reason: SURG

## 2021-12-22 RX ORDER — DEXTROSE AND SODIUM CHLORIDE 5; .45 G/100ML; G/100ML
30 INJECTION, SOLUTION INTRAVENOUS CONTINUOUS PRN
Status: DISCONTINUED | OUTPATIENT
Start: 2021-12-22 | End: 2021-12-22 | Stop reason: HOSPADM

## 2021-12-22 RX ORDER — PROPOFOL 10 MG/ML
VIAL (ML) INTRAVENOUS AS NEEDED
Status: DISCONTINUED | OUTPATIENT
Start: 2021-12-22 | End: 2021-12-22 | Stop reason: SURG

## 2021-12-22 RX ADMIN — FENTANYL CITRATE 100 MCG: 50 INJECTION INTRAMUSCULAR; INTRAVENOUS at 11:19

## 2021-12-22 RX ADMIN — MIDAZOLAM HYDROCHLORIDE 2 MG: 1 INJECTION, SOLUTION INTRAMUSCULAR; INTRAVENOUS at 11:19

## 2021-12-22 RX ADMIN — LIDOCAINE HYDROCHLORIDE 100 MG: 20 INJECTION, SOLUTION INTRAVENOUS at 11:20

## 2021-12-22 RX ADMIN — PROPOFOL 50 MG: 10 INJECTION, EMULSION INTRAVENOUS at 11:20

## 2021-12-22 RX ADMIN — PROPOFOL 100 MG: 10 INJECTION, EMULSION INTRAVENOUS at 11:33

## 2021-12-22 RX ADMIN — DEXTROSE AND SODIUM CHLORIDE 30 ML/HR: 5; 450 INJECTION, SOLUTION INTRAVENOUS at 10:17

## 2021-12-22 NOTE — ANESTHESIA POSTPROCEDURE EVALUATION
Patient: Sravanthi Ellison    Procedure Summary     Date: 12/22/21 Room / Location: [facility] ENDOSCOPY 3 / [facility] ENDOSCOPY    Anesthesia Start: 1118 Anesthesia Stop: 1139    Procedures:       ESOPHAGOGASTRODUODENOSCOPY WITH DILATATION--bx (N/A )      COLONOSCOPY (N/A ) Diagnosis:       Other iron deficiency anemia      Folate deficiency      Diarrhea, unspecified type      Pain of upper abdomen      Epigastric pain      Dysphagia, unspecified type      (Other iron deficiency anemia [D50.8])      (Folate deficiency [E53.8])      (Diarrhea, unspecified type [R19.7])      (Pain of upper abdomen [R10.10])      (Epigastric pain [R10.13])      (Dysphagia, unspecified type [R13.10])    Surgeons: Marshal Staley MD Provider: Naomi Cronin CRNA    Anesthesia Type: MAC ASA Status: 2          Anesthesia Type: MAC    Vitals  No vitals data found for the desired time range.          Post Anesthesia Care and Evaluation    Patient location during evaluation: PACU  Patient participation: complete - patient participated  Level of consciousness: awake and alert  Pain score: 0  Pain management: adequate  Airway patency: patent  Anesthetic complications: No anesthetic complications  PONV Status: none  Cardiovascular status: acceptable  Respiratory status: acceptable  Hydration status: acceptable

## 2021-12-22 NOTE — ANESTHESIA PREPROCEDURE EVALUATION
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, has PTSD, no other residual effects from injury  GI/Hepatic/Renal/Endo    (+)  hiatal hernia, GERD,  diabetes mellitus type 2 well controlled,     Musculoskeletal     Abdominal    Substance History      OB/GYN          Other                Anesthesia Evaluation     no history of anesthetic complications:  NPO Solid Status: > 8 hours  NPO Liquid Status: > 2 hours     Airway   Mallampati: II  TM distance: >3 FB  Neck ROM: full  no difficulty expected  Dental - normal exam     Pulmonary - negative pulmonary ROS and normal exam   Cardiovascular - negative cardio ROS and normal exam        Neuro/Psych    ROS Comment: 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               Anesthesia Plan    ASA 2     MAC     intravenous induction     Anesthetic plan, all risks, benefits, and alternatives have been provided, discussed and informed consent has been obtained with: patient.    Plan discussed with CRNA.

## 2021-12-27 LAB
LAB AP CASE REPORT: NORMAL
PATH REPORT.FINAL DX SPEC: NORMAL

## 2022-01-18 ENCOUNTER — OFFICE VISIT (OUTPATIENT)
Dept: GASTROENTEROLOGY | Facility: CLINIC | Age: 62
End: 2022-01-18

## 2022-01-18 VITALS
WEIGHT: 143 LBS | SYSTOLIC BLOOD PRESSURE: 111 MMHG | DIASTOLIC BLOOD PRESSURE: 73 MMHG | BODY MASS INDEX: 22.98 KG/M2 | HEART RATE: 81 BPM | HEIGHT: 66 IN

## 2022-01-18 DIAGNOSIS — K21.00 GASTROESOPHAGEAL REFLUX DISEASE WITH ESOPHAGITIS WITHOUT HEMORRHAGE: ICD-10-CM

## 2022-01-18 DIAGNOSIS — R19.7 DIARRHEA, UNSPECIFIED TYPE: ICD-10-CM

## 2022-01-18 DIAGNOSIS — D50.8 OTHER IRON DEFICIENCY ANEMIA: Primary | ICD-10-CM

## 2022-01-18 DIAGNOSIS — E53.8 FOLATE DEFICIENCY: ICD-10-CM

## 2022-01-18 PROCEDURE — 99214 OFFICE O/P EST MOD 30 MIN: CPT | Performed by: PHYSICIAN ASSISTANT

## 2022-01-18 RX ORDER — SEMAGLUTIDE 1.34 MG/ML
2 INJECTION, SOLUTION SUBCUTANEOUS
COMMUNITY
Start: 2021-11-16

## 2022-01-20 RX ORDER — FLUTICASONE PROPIONATE 50 MCG
SPRAY, SUSPENSION (ML) NASAL
Qty: 16 G | Refills: 11 | Status: SHIPPED | OUTPATIENT
Start: 2022-01-20 | End: 2022-03-04 | Stop reason: SDUPTHER

## 2022-03-04 ENCOUNTER — OFFICE VISIT (OUTPATIENT)
Dept: OTOLARYNGOLOGY | Facility: CLINIC | Age: 62
End: 2022-03-04

## 2022-03-04 VITALS — OXYGEN SATURATION: 100 % | WEIGHT: 148 LBS | HEIGHT: 66 IN | BODY MASS INDEX: 23.78 KG/M2

## 2022-03-04 DIAGNOSIS — J31.0 CHRONIC RHINITIS: Primary | ICD-10-CM

## 2022-03-04 DIAGNOSIS — H61.23 BILATERAL IMPACTED CERUMEN: ICD-10-CM

## 2022-03-04 PROCEDURE — 69210 REMOVE IMPACTED EAR WAX UNI: CPT | Performed by: OTOLARYNGOLOGY

## 2022-03-04 PROCEDURE — 99213 OFFICE O/P EST LOW 20 MIN: CPT | Performed by: OTOLARYNGOLOGY

## 2022-03-04 RX ORDER — FLUTICASONE PROPIONATE 50 MCG
2 SPRAY, SUSPENSION (ML) NASAL DAILY
Qty: 48 G | Refills: 3 | Status: SHIPPED | OUTPATIENT
Start: 2022-03-04 | End: 2023-02-20

## 2022-03-04 RX ORDER — MONTELUKAST SODIUM 10 MG/1
10 TABLET ORAL DAILY
Qty: 90 TABLET | Refills: 3 | Status: SHIPPED | OUTPATIENT
Start: 2022-03-04 | End: 2023-03-03 | Stop reason: SDUPTHER

## 2022-03-04 RX ORDER — AZELASTINE 1 MG/ML
2 SPRAY, METERED NASAL 2 TIMES DAILY
Qty: 90 ML | Refills: 3 | Status: SHIPPED | OUTPATIENT
Start: 2022-03-04 | End: 2023-03-03 | Stop reason: SDUPTHER

## 2022-03-04 NOTE — PROGRESS NOTES
Subjective   Sravanthi Ellsion is a 61 y.o. female.       History of Present Illness   Patient is followed with hearing loss that is been stable on serial audiograms.  Also has trouble with cerumen impactions and chronic allergic rhinitis.  States she is doing better since she has been using Astelin, Singulair, and Flonase along with an allergy medicine to desensitize her to dogs.  Says her ears feel stopped up.      The following portions of the patient's history were reviewed and updated as appropriate: allergies, current medications, past family history, past medical history, past social history, past surgical history and problem list.     reports that she has never smoked. She has never used smokeless tobacco. She reports that she does not drink alcohol and does not use drugs.   Patient is not a tobacco user and has not been counseled for use of tobacco products      Review of Systems        Objective   Physical Exam  Both ear canals show cerumen impactions  Using the binocular microscope for visualization, cerumen impaction was removed from bilateral ear canal(s) using instrumentation. This was personally performed by Osiel Menon MD  Following cerumen removal tympanic membranes are noted to be intact no infection or effusion  Nares: Boggy mucosa no discharge or purulence    Assessment/Plan   Diagnoses and all orders for this visit:    1. Chronic rhinitis (Primary)    2. Bilateral impacted cerumen    Other orders  -     azelastine (ASTELIN) 0.1 % nasal spray; 2 sprays into the nostril(s) as directed by provider 2 (Two) Times a Day. Use in each nostril as directed  Dispense: 90 mL; Refill: 3  -     fluticasone (FLONASE) 50 MCG/ACT nasal spray; 2 sprays by Each Nare route Daily.  Dispense: 48 g; Refill: 3  -     montelukast (SINGULAIR) 10 MG tablet; Take 1 tablet by mouth Daily.  Dispense: 90 tablet; Refill: 3      Plan: Cerumen removed as described above.  Will refill her Astelin, Flonase, and  Singulair.  Return in a year with an audiogram at that time.

## 2022-03-29 RX ORDER — FOLIC ACID 1 MG/1
1 TABLET ORAL DAILY
Qty: 30 TABLET | Refills: 1 | Status: SHIPPED | OUTPATIENT
Start: 2022-03-29 | End: 2022-06-07 | Stop reason: SDUPTHER

## 2022-04-11 RX ORDER — LANOLIN ALCOHOL/MO/W.PET/CERES
325 CREAM (GRAM) TOPICAL
Qty: 30 TABLET | Refills: 0 | Status: SHIPPED | OUTPATIENT
Start: 2022-04-11 | End: 2022-05-10

## 2022-04-26 ENCOUNTER — OFFICE VISIT (OUTPATIENT)
Dept: GASTROENTEROLOGY | Facility: CLINIC | Age: 62
End: 2022-04-26

## 2022-04-26 VITALS
SYSTOLIC BLOOD PRESSURE: 113 MMHG | HEART RATE: 79 BPM | BODY MASS INDEX: 22.98 KG/M2 | WEIGHT: 143 LBS | DIASTOLIC BLOOD PRESSURE: 69 MMHG | HEIGHT: 66 IN

## 2022-04-26 DIAGNOSIS — E53.8 FOLATE DEFICIENCY: ICD-10-CM

## 2022-04-26 DIAGNOSIS — D50.8 OTHER IRON DEFICIENCY ANEMIA: Primary | ICD-10-CM

## 2022-04-26 DIAGNOSIS — K21.00 GASTROESOPHAGEAL REFLUX DISEASE WITH ESOPHAGITIS WITHOUT HEMORRHAGE: ICD-10-CM

## 2022-04-26 DIAGNOSIS — R10.10 PAIN OF UPPER ABDOMEN: ICD-10-CM

## 2022-04-26 PROCEDURE — 99213 OFFICE O/P EST LOW 20 MIN: CPT | Performed by: PHYSICIAN ASSISTANT

## 2022-05-10 RX ORDER — LANOLIN ALCOHOL/MO/W.PET/CERES
325 CREAM (GRAM) TOPICAL
Qty: 30 TABLET | Refills: 2 | Status: SHIPPED | OUTPATIENT
Start: 2022-05-10 | End: 2023-05-10

## 2022-06-07 RX ORDER — FOLIC ACID 1 MG/1
1 TABLET ORAL DAILY
Qty: 30 TABLET | Refills: 2 | Status: SHIPPED | OUTPATIENT
Start: 2022-06-07 | End: 2022-09-13

## 2022-09-13 ENCOUNTER — OFFICE VISIT (OUTPATIENT)
Dept: GASTROENTEROLOGY | Facility: CLINIC | Age: 62
End: 2022-09-13

## 2022-09-13 VITALS
DIASTOLIC BLOOD PRESSURE: 69 MMHG | BODY MASS INDEX: 22.9 KG/M2 | SYSTOLIC BLOOD PRESSURE: 100 MMHG | WEIGHT: 142.5 LBS | HEART RATE: 107 BPM | HEIGHT: 66 IN

## 2022-09-13 DIAGNOSIS — D50.8 OTHER IRON DEFICIENCY ANEMIA: Primary | ICD-10-CM

## 2022-09-13 DIAGNOSIS — K59.00 CONSTIPATION, UNSPECIFIED CONSTIPATION TYPE: ICD-10-CM

## 2022-09-13 DIAGNOSIS — R10.10 PAIN OF UPPER ABDOMEN: ICD-10-CM

## 2022-09-13 DIAGNOSIS — K21.00 GASTROESOPHAGEAL REFLUX DISEASE WITH ESOPHAGITIS WITHOUT HEMORRHAGE: ICD-10-CM

## 2022-09-13 PROCEDURE — 99213 OFFICE O/P EST LOW 20 MIN: CPT | Performed by: PHYSICIAN ASSISTANT

## 2022-09-13 RX ORDER — COVID-19 MOLECULAR TEST ASSAY
KIT MISCELLANEOUS
COMMUNITY
Start: 2022-07-15 | End: 2023-02-01

## 2022-09-13 RX ORDER — TIZANIDINE 4 MG/1
TABLET ORAL
COMMUNITY
Start: 2022-08-29 | End: 2023-04-03

## 2022-09-13 RX ORDER — METHYLPREDNISOLONE 4 MG/1
TABLET ORAL
COMMUNITY
Start: 2022-07-22 | End: 2023-04-03

## 2022-09-13 RX ORDER — CYCLOBENZAPRINE HCL 5 MG
5 TABLET ORAL 3 TIMES DAILY PRN
COMMUNITY
Start: 2022-07-25 | End: 2023-04-03

## 2022-09-13 RX ORDER — TRAMADOL HYDROCHLORIDE 50 MG/1
50 TABLET ORAL EVERY 4 HOURS PRN
COMMUNITY
Start: 2022-06-08 | End: 2023-04-03

## 2022-09-13 RX ORDER — MAGNESIUM OXIDE 400 MG/1
TABLET ORAL
COMMUNITY
Start: 2022-06-16

## 2022-09-13 RX ORDER — HYDROCODONE BITARTRATE AND ACETAMINOPHEN 7.5; 325 MG/1; MG/1
1 TABLET ORAL EVERY 6 HOURS PRN
COMMUNITY
Start: 2022-08-29

## 2022-09-13 NOTE — PROGRESS NOTES
Chief Complaint   Patient presents with   • Follow-up   • Anemia       ENDO PROCEDURE ORDERED:    Subjective    Sravanthi Ellison is a 62 y.o. female. she is here today for follow-up.    History of Present Illness    Patient seen on a recheck of her GERD, anemia. Last seen 04/26/2022. She was given a trial on Levsin. She did have her back surgery 4 weeks ago. She is still healing from that. She has been feeling like she is belching food, things seem to sit substernally. She is on Prilosec 40 mg daily. She tends to be constipated. She states she may go up to a week without a bowel movement and cannot eat because of it. Her weight is down half a pound since last visit. She has tried Levsin, it is not clear if that has helped her. She had a previous EGD/colonoscopy 12/22/2021 showed esophagitis, gastritis, hemorrhoids.     Patient had laboratories with Ijeoma Joseph 05/19/2022: Showed normal cholesterol. CMP showed glucose 110, otherwise normal. A1C 6.4%. Normal TSH. CBC was okay with hemoglobin of 12.0. Ferritin was 6, folate was greater than 24. She states she had more laboratories a few weeks ago. She had laboratories 08/02/2022: CMP showed glucose 108. CBC showed hemoglobin 14. Normal TSH, T3, T4. A1c 6.6%. Normal uric acid, cholesterol and vitamin D. B12 1500. Magnesium low at 1.6. Laboratories on 08/04/2022: Urinalysis showed glucose. BMP showed glucose 118, otherwise normal. CBC showed hemoglobin 13.5, INR normal.    ASSESSMENT/PLAN: Patient with iron and folate deficiency, anemia, these seem to have improved. She may stop the folic acid. I suggested she continue on the iron for a bit longer. Will give her a trial on Linzess 72 mcg for the constipation. Would consider further studies but will let her recover from her surgeries. Will plan followup in a few months, sooner if needed, further pending clinical course and results of the above.       The following portions of the patient's history were reviewed and  updated as appropriate:   Past Medical History:   Diagnosis Date   • Diabetes mellitus (HCC)    • Diarrhea    • Diverticular disease of colon    • Gastroesophageal reflux disease    • Hiatal hernia    • Irritable bowel syndrome    • Nausea    • Right upper quadrant pain    • Traumatic brain injury (HCC) 06/2017     Past Surgical History:   Procedure Laterality Date   • BREAST BIOPSY     • CARPAL TUNNEL INJECTION     • CATARACT EXTRACTION     • CHOLECYSTECTOMY     • COLONOSCOPY  12/12/2011    Mild diverticulosis in sigmoid colon. Stool collected for study.   • COLONOSCOPY N/A 12/6/2017    Procedure: COLONOSCOPY--look TI, bx;  Surgeon: Marshal Staley MD;  Location: [facility] ENDOSCOPY;  Service:    • COLONOSCOPY N/A 12/22/2021    Procedure: COLONOSCOPY;  Surgeon: Marshal Staley MD;  Location: [facility] ENDOSCOPY;  Service: Gastroenterology;  Laterality: N/A;   • ENDOSCOPY N/A 12/6/2017    Procedure: ESOPHAGOGASTRODUODENOSCOPY--small bowel biopsies;  Surgeon: Marshal Staley MD;  Location: [facility] ENDOSCOPY;  Service:    • ENDOSCOPY N/A 12/22/2021    Procedure: ESOPHAGOGASTRODUODENOSCOPY WITH DILATATION--bx;  Surgeon: Marshal Staley MD;  Location: [facility] ENDOSCOPY;  Service: Gastroenterology;  Laterality: N/A;   • ENDOSCOPY W/ PEG TUBE PLACEMENT  12/12/2011    Normal hypopharynx, esophagus, symmetrical & patent pylorus. Hiatus hernia in GE junction. Non-erosive gastritis in stomach. Multiple biopsies taken. Normal duodenum. Multiple biopsies taken.   • SHOULDER ARTHROSCOPY     • TONSILLECTOMY     • UPPER GASTROINTESTINAL ENDOSCOPY  12/12/2011   • UPPER GASTROINTESTINAL ENDOSCOPY  12/06/2017   • UPPER GASTROINTESTINAL ENDOSCOPY  12/22/2021     Family History   Problem Relation Age of Onset   • Breast cancer Other    • Diabetes Other    • Heart disease Other    • Hypertension Other    • Skin cancer Other      OB History    No obstetric history on file.       Allergies   Allergen Reactions   • Other GI Intolerance     Regulo  gal   • Adhesive Tape Dermatitis     Itching, redness, rash     Social History     Socioeconomic History   • Marital status:    Tobacco Use   • Smoking status: Never Smoker   • Smokeless tobacco: Never Used   Vaping Use   • Vaping Use: Never used   Substance and Sexual Activity   • Alcohol use: No   • Drug use: No   • Sexual activity: Defer     Current Medications:  Prior to Admission medications    Medication Sig Start Date End Date Taking? Authorizing Provider   ALLERGY SERUM INJECTION Inject 1 each under the skin into the appropriate area as directed.   Yes Vishal Hsu MD   azelastine (ASTELIN) 0.1 % nasal spray 2 sprays into the nostril(s) as directed by provider 2 (Two) Times a Day. Use in each nostril as directed 3/4/22  Yes Osiel Menon MD   BinaxNOW COVID-19 Ag Home Test kit TEST AS DIRECTED TODAY 7/15/22  Yes Vishal Hsu MD   busPIRone (BUSPAR) 7.5 MG tablet Take 7.5 mg by mouth 2 (Two) Times a Day. 3/29/18  Yes Vishal Hsu MD   Cyanocobalamin (VITAMIN B-12 ER PO) Take 1,000 mg by mouth Daily.   Yes Vishal Hsu MD   cyclobenzaprine (FLEXERIL) 5 MG tablet Take 5 mg by mouth 3 (Three) Times a Day As Needed. for muscle spams 7/25/22  Yes Vishal Hsu MD   DULoxetine (CYMBALTA) 30 MG capsule take 1 tablet by mouth every morning for anxiety depression 12/20/17  Yes Vishal Hsu MD   empagliflozin (JARDIANCE) 25 MG tablet tablet Take 25 mg by mouth Daily.   Yes Vishal Hsu MD   ferrous sulfate 325 (65 FE) MG EC tablet TAKE 1 TABLET BY MOUTH DAILY WITH BREAKFAST 5/10/22 5/10/23 Yes Abel Laboy PA-C   fluticasone (FLONASE) 50 MCG/ACT nasal spray 2 sprays by Each Nare route Daily. 3/4/22  Yes Osiel Menon MD   folic acid (FOLVITE) 1 MG tablet Take 1 tablet by mouth Daily. 6/7/22  Yes Abel Laboy PA-C   HYDROcodone-acetaminophen (NORCO) 7.5-325 MG per tablet Take 1 tablet by mouth Every 6 (Six) Hours As  "Needed. 8/29/22  Yes Vishal Hsu MD   hydrOXYzine (ATARAX) 10 MG tablet Take 10 mg by mouth 3 (Three) Times a Day As Needed for Itching.   Yes Vishal Hsu MD   hyoscyamine (LEVSIN) 0.125 MG SL tablet Take 1 tablet by mouth Every 4 (Four) Hours As Needed for Cramping (abd pain). 4/26/22  Yes Abel Laboy PA-C   magnesium oxide (MAG-OX) 400 MG tablet TAKE 1 TABLET(400 MG) BY MOUTH DAILY 6/16/22  Yes Vishal Hsu MD   meloxicam (MOBIC) 15 MG tablet Take 15 mg by mouth Daily.   Yes Vishal Hsu MD   metFORMIN (GLUCOPHAGE) 1000 MG tablet Take 1,000 mg by mouth 2 (Two) Times a Day With Meals.   Yes Vishal Hsu MD   methylPREDNISolone (MEDROL) 4 MG dose pack follow package directions 7/22/22  Yes Vishal Hsu MD   montelukast (SINGULAIR) 10 MG tablet Take 1 tablet by mouth Daily. 3/4/22  Yes Osiel Menon MD   omeprazole (priLOSEC) 40 MG capsule Take 40 mg by mouth Daily.   Yes Vishal sHu MD   pioglitazone (ACTOS) 30 MG tablet Take 30 mg by mouth Daily.   Yes Vishal Hsu MD   pravastatin (PRAVACHOL) 20 MG tablet Take 20 mg by mouth Daily.   Yes Vishal Hsu MD   Semaglutide, 1 MG/DOSE, (Ozempic, 1 MG/DOSE,) 4 MG/3ML solution pen-injector INJECT 1 MG EVERY 7 DAYS 11/16/21  Yes Vishal Hsu MD   tiZANidine (ZANAFLEX) 4 MG tablet  8/29/22  Yes Vishal Hsu MD   traMADol (ULTRAM) 50 MG tablet Take 50 mg by mouth Every 4 (Four) Hours As Needed. 6/8/22  Yes Vishal Hsu MD     Review of Systems  Review of Systems       Objective    /69 (BP Location: Left arm)   Pulse 107   Ht 167.6 cm (66\")   Wt 64.6 kg (142 lb 8 oz)   BMI 23.00 kg/m²   Physical Exam  Vitals and nursing note reviewed.   Constitutional:       General: She is not in acute distress.     Appearance: She is well-developed. She is ill-appearing.   HENT:      Head: Normocephalic and atraumatic.   Eyes:      Pupils: Pupils are " equal, round, and reactive to light.   Cardiovascular:      Rate and Rhythm: Normal rate and regular rhythm.      Heart sounds: Normal heart sounds.   Pulmonary:      Effort: Pulmonary effort is normal.      Breath sounds: Normal breath sounds.   Abdominal:      General: Bowel sounds are normal. There is no distension or abdominal bruit.      Palpations: Abdomen is soft. Abdomen is not rigid. There is no shifting dullness or mass.      Tenderness: There is abdominal tenderness. There is no guarding or rebound.      Hernia: No hernia is present. There is no hernia in the ventral area.   Musculoskeletal:         General: Normal range of motion.      Cervical back: Normal range of motion.   Skin:     General: Skin is warm and dry.   Neurological:      Mental Status: She is alert and oriented to person, place, and time.   Psychiatric:         Behavior: Behavior normal.         Thought Content: Thought content normal.         Judgment: Judgment normal.       Assessment & Plan      1. Other iron deficiency anemia    2. Gastroesophageal reflux disease with esophagitis without hemorrhage    3. Pain of upper abdomen    4. Constipation, unspecified constipation type    .   Diagnoses and all orders for this visit:    1. Other iron deficiency anemia (Primary)    2. Gastroesophageal reflux disease with esophagitis without hemorrhage    3. Pain of upper abdomen    4. Constipation, unspecified constipation type    Other orders  -     linaclotide (Linzess) 72 MCG capsule capsule; Take 1 capsule by mouth Every Morning Before Breakfast.  Dispense: 30 capsule; Refill: 3        Orders placed during this encounter include:  No orders of the defined types were placed in this encounter.      Medications prescribed:  New Medications Ordered This Visit   Medications   • linaclotide (Linzess) 72 MCG capsule capsule     Sig: Take 1 capsule by mouth Every Morning Before Breakfast.     Dispense:  30 capsule     Refill:  3       Requested  Prescriptions     Signed Prescriptions Disp Refills   • linaclotide (Linzess) 72 MCG capsule capsule 30 capsule 3     Sig: Take 1 capsule by mouth Every Morning Before Breakfast.       Review and/or summary of lab tests, radiology, procedures, medications. Review and summary of old records and obtaining of history. The risks and benefits of my recommendations, as well as other treatment options were discussed with the patient today. Questions were answered.    Follow-up: Return in about 3 months (around 12/13/2022), or if symptoms worsen or fail to improve.     * Surgery not found *      This document has been electronically signed by Abel Laboy PA-C on September 30, 2022 15:13 CDT      Results for orders placed or performed during the hospital encounter of 12/22/21   Tissue Pathology Exam    Specimen: A: Small Intestine, Duodenum; Tissue    B: Gastric, Antrum; Tissue    C: Esophagus, Distal; Tissue    D: Large Intestine; Tissue   Result Value Ref Range    Case Report       Surgical Pathology Report                         Case: [id number]                                  Authorizing Provider:  Marshal Staley MD        Collected:           12/22/2021 11:27 AM          Ordering Location:     [facility]   Received:            12/22/2021 01:12 PM                                 [facility]                                                     Pathologist:           Raphael Noe MD                                                           Specimens:   1) - Small Intestine, Duodenum, small bowel bx                                                      2) - Gastric, Antrum, antrum bx                                                                     3) - Esophagus, Distal, distal esophagus bx                                                         4) - Large Intestine, colonic mucosa bx                                                    Final Diagnosis       SEE SCANNED REPORT        POC Glucose Once    Specimen: Blood   Result Value Ref Range    Glucose 79 70 - 130 mg/dL   Results for orders placed or performed in visit on 12/20/21   COVID-19, [facility] IN-HOUSE, NP SWAB IN TRANSPORT MEDIA 8-10 HR TAT - Swab, Nasopharynx    Specimen: Nasopharynx; Swab   Result Value Ref Range    COVID19 Not Detected Not Detected - Ref. Range   Results for orders placed or performed in visit on 05/01/18   Alpha - Gal Panel    Specimen: Blood   Result Value Ref Range    Beef 0.16 <0.35 kU/L    Class Description 0/1     Lamb <0.10 <0.35 kU/L    Class Interpretation 0     Pork <0.10 <0.35 kU/L    Class Interpretation 0     Alpha Gal IgE 1.34 (H) <0.35 kU/L   Results for orders placed or performed during the hospital encounter of 12/06/17   Tissue Pathology Exam - Tissue, Small Intestine, Duodenum    Specimen: A: Small Intestine, Duodenum; Tissue    B: Gastric, Antrum; Tissue    C: Small Intestine, Ileum; Tissue    D: Large Intestine; Tissue   Result Value Ref Range    Case Report       Surgical Pathology Report                         Case: [id number]                                  Authorizing Provider:  Marshal Staley MD         Collected:           12/06/2017 10:50 AM          Ordering Location:     [facility]             Received:            12/06/2017 12:01 PM                                 [facility]                                                     Pathologist:           Jared Kelley MD                                                          Specimens:   1) - Small Intestine, Duodenum, small bowel bx                                                      2) - Gastric, Antrum, antrum bx                                                                     3) - Small Intestine, Ileum, ti bx                                                                  4) - Large Intestine, colonic mucosa bx                                                    Final Diagnosis       1.  MUCOSA,  DUODENUM:  NO SIGNIFICANT HISTOLOGIC ABNORMALITY.    2.  MUCOSA, ANTRUM OF STOMACH:  CHRONIC GASTRITIS WITH BENIGN LYMPHOID AGGREGATES AND CONGESTION.  NEGATIVE FOR HELICOBACTER PYLORI (HP IMMUNOSTAIN).    3.  MUCOSA, TERMINAL ILEUM:  NO SIGNIFICANT HISTOLOGIC ABNORMALITY.    4.  MUCOSA, COLON:  NO SIGNIFICANT HISTOLOGIC ABNORMALITY.      Comment       Helicobacter pylori (HP) immunostain is performed because an appropriate inflammatory milieu is present and organisms are not seen on H & E stained slides.    HP immunostain was developed and its performance characteristics [facility].  It has not been cleared or approved by the U.S. Food and Drug Administration.  The FDA has determined that such clearance or approval is not necessary.  This test is used for clinical purposes.  It should not be regarded as investigational or for research.  This laboratory is certified under the Clinical Laboratory Improvement Amendments of 1988 (CLIA-88) as qualified to perform high complexity clinical laboratory testing.            Gross Description       Received for examination are 4 containers, each of which have nodular bits of white soft tissue measuring 0.3-0.5 cc in aggregate.  All specimens are embedded as labeled.  1A duodenum; 2A antrum of stomach; 3A terminal ileum; 4A mucosa of colon.      Embedded Images     POC Glucose Fingerstick    Specimen: Blood   Result Value Ref Range    Glucose 81 70 - 130 mg/dL   Results for orders placed or performed in visit on 11/28/17   Gastrointestinal Panel, PCR - Stool, Per Rectum    Specimen: Per Rectum; Stool   Result Value Ref Range    Campylobacter Not Detected Not Detected    Clostridium difficile (toxin A/B) Not Detected Not Detected, NA formed stool, C diff not applicable on patient less than one year of age    Plesiomonas shigelloides Not Detected Not Detected    Salmonella Not Detected Not Detected    Vibrio Not Detected Not  Detected    Vibrio cholerae Not Detected Not Detected    Yersinia enterocolitica Not Detected Not Detected    Enteroaggregative E. coli (EAEC) Not Detected Not Detected    Enteropathogenic E. coli (EPEC) Not Detected Not Detected    Enterotoxigenic E. coli (ETEC) lt/st Not Detected Not Detected    Shiga-like toxin-producing E. coli (STEC) stx1/stx2 Not Detected Not Detected    E. coli O157 Not Detected Not Detected    Shigella/Enteroinvasive E. coli (EIEC) Not Detected Not Detected    Cryptosporidium Not Detected Not Detected    Cyclospora cayetanensis Not Detected Not Detected    Entamoeba histolytica Not Detected Not Detected    Giardia lamblia Not Detected Not Detected    Adenovirus F40/41 Not Detected Not Detected    Astrovirus Not Detected Not Detected    Norovirus GI/GII Not Detected Not Detected    Rotavirus A Not Detected Not Detected    Sapovirus (I, II, IV or V) Not Detected Not Detected   Glia(IgA / G) & TTG(IgA / G)    Specimen: Blood   Result Value Ref Range    Gliadin Deamidated Peptide Ab, IgA 5 0 - 19 units    Deaminated Gliadin Ab IgG 4 0 - 19 units    Tissue Transglutaminase IgA <2 0 - 3 U/mL    Tissue Transglutaminase IgG <2 0 - 5 U/mL   Allergens (12) Foods    Specimen: Blood   Result Value Ref Range    Class Description Comment     Egg White <0.10 Class 0 kU/L    Milk, Cow's <0.10 Class 0 kU/L    CodFish <0.10 Class 0 kU/L    Sesame Seed <0.10 Class 0 kU/L    Peanut <0.10 Class 0 kU/L    Soybean <0.10 Class 0 kU/L    Hazelnut <0.10 Class 0 kU/L    Shrimp <0.10 Class 0 kU/L    Scallop <0.10 Class 0 kU/L    Gluten <0.10 Class 0 kU/L    [place] <0.10 Class 0 kU/L    Wheat <0.10 Class 0 kU/L   Trypsin    Specimen: Blood   Result Value Ref Range    Trypsin 471 169 - 773 ng/mL   Lipase    Specimen: Blood   Result Value Ref Range    Lipase 104 23 - 300 U/L   Amylase    Specimen: Blood   Result Value Ref Range    Amylase 60 25 - 140 U/L   Results for orders placed or performed in visit on 11/07/16   [facility]  COLONOSCOPY   Result Value Ref Range     Colonoscopy completed; ordered by Dr. Ijeoma Joseph    Results for orders placed or performed in visit on 12/12/11   Converted Surgical Pathology    Specimen: Tissue   Result Value Ref Range    Spec Descr 1 SPECIMEN(S): A SMALL BOWEL BIOPSY     Specimen description 2 SPECIMEN(S): B GASTRIC BIOPSY     Specimen description 3 SPECIMEN(S): C TERMINAL ILEUM BIOPSY     Specimen description 4 SPECIMEN(S): D MUCOSAL COLON BIOPSY     Preoperative Diagnosis   PREOPERATIVE DIAGNOSIS:  Diarrhea       Postoperative Diagnosis         POSTOPERATIVE DIAGNOSIS:  Small bowel (A), gastric mucosa (B), terminal ileum (C), colonic mucosa  (D)      Gross Description         GROSS DESCRIPTION:  In 4 containers, each of these show mucosal biopsies measuring up to 0.3  cm in greatest dimension.  Embedded accordingly.  (A) small bowel, (B)  gastric mucosa, (C) terminal ileum, (D) colonic mucosa.      Final Diagnosis         FINAL DIAGNOSIS:  A.  SMALL BOWEL, MUCOSAL BIOPSY:             NO SIGNIFICANT PATHOLOGIC DIAGNOSIS.  B.  GASTRIC MUCOSAL BIOPSY:             MILD CHRONIC INFLAMMATION.             NO EVIDENCE OF HELICOBACTER PYLORI (IP STAIN PERFORMED).  C.  TERMINAL ILEUM, MUCOSAL BIOPSY:             NO SIGNIFICANT PATHOLOGIC DIAGNOSIS.  D.  COLONIC MUCOSAL BIOPSY, RANDOM:             NO SIGNIFICANT PATHOLOGIC DIAGNOSIS.      Comment         COMMENT:  Helicobacter immunoperoxidase stain performed on block B.  HP immunostain was developed and its performance characteristics  determined by [facility].  It has not been  cleared or approved by the U.S. Food and Drug Administration.  The FDA  has determined that such clearance or approval is not necessary.  This  test is used for clinical purposes.  It should not be regarded as  investigational or for research.  This laboratory is certified under the  Clinical Laboratory Improvement Amendments of 1988 (CLIA-88)  "as  qualified to perform high complexity clinical laboratory testing.      CONVERTED (HISTORICAL) FINAL PATHOLOGIST       Diagnostician:  BESSIE ESTES M.D.  Pathologist  Electronically Signed 12/13/2011      Diagnosis Code   DIAGNOSIS CODE:  4      Results for orders placed or performed in visit on 11/25/03   Converted Surgical Pathology    Specimen: Tissue   Result Value Ref Range    Spec Descr 1 SPECIMEN(S): A GASTRIC BIOPSY     Gross Description         GROSS DESCRIPTION:  The specimen is labeled \"gastric mucosa\" and consists of two tan  fragments measuring 0.8 x 0.2 x 0.3 cm together.  Totally submitted.      Final Diagnosis         FINAL DIAGNOSIS:  GASTRIC MUCOSA, BIOPSY:            CHRONIC GASTRITIS SHOWING MINIMAL ACTIVITY.            A SPECIAL STAIN FOR HELICOBACTER PYLORI IS NEGATIVE.  pgl    DIAGNOSIS CODE:  6      Comment   CLINICAL DIAGNOSIS:  Gastric mucosa biopsy       CONVERTED (HISTORICAL) FINAL PATHOLOGIST       Diagnostician:  JOY ALVARADO M.D.  Pathologist  Electronically Signed 11/28/2003     Results for orders placed or performed in visit on 04/01/03   Converted Surgical Pathology    Specimen: Tissue   Result Value Ref Range    Spec Descr 1 SPECIMEN(S): A MUCOSAL COLON BIOPSY     Gross Description         GROSS DESCRIPTION:  The container is labeled \"mucosa, colon (cold biopsy)\" and has nodular  bits of white soft tissue 0.6 cc in aggregate.  The entire specimen is  embedded.      Final Diagnosis         FINAL DIAGNOSIS:  MUCOSA, COLON:              FRAGMENTS OF MUCOSA OF THE COLON WITH A FEW BENIGN                   LYMPHOID NODULES.  [facility]    DIAGNOSIS CODE:  2      Comment   CLINICAL DIAGNOSIS:  Diarrhea       CONVERTED (HISTORICAL) FINAL PATHOLOGIST       Diagnostician:  ALVARO HEREDIA M.D.  Pathologist  Electronically Signed 04/02/2003         "

## 2022-12-20 ENCOUNTER — OFFICE VISIT (OUTPATIENT)
Dept: GASTROENTEROLOGY | Facility: CLINIC | Age: 62
End: 2022-12-20
Payer: COMMERCIAL

## 2022-12-20 VITALS
DIASTOLIC BLOOD PRESSURE: 74 MMHG | BODY MASS INDEX: 22.92 KG/M2 | HEART RATE: 101 BPM | WEIGHT: 142 LBS | SYSTOLIC BLOOD PRESSURE: 95 MMHG

## 2022-12-20 DIAGNOSIS — K21.00 GASTROESOPHAGEAL REFLUX DISEASE WITH ESOPHAGITIS WITHOUT HEMORRHAGE: ICD-10-CM

## 2022-12-20 DIAGNOSIS — R10.13 EPIGASTRIC PAIN: ICD-10-CM

## 2022-12-20 DIAGNOSIS — D50.8 OTHER IRON DEFICIENCY ANEMIA: Primary | ICD-10-CM

## 2022-12-20 DIAGNOSIS — R10.10 PAIN OF UPPER ABDOMEN: ICD-10-CM

## 2022-12-20 PROCEDURE — 99214 OFFICE O/P EST MOD 30 MIN: CPT | Performed by: PHYSICIAN ASSISTANT

## 2022-12-20 NOTE — PROGRESS NOTES
Chief Complaint   Patient presents with   • Follow-up       ENDO PROCEDURE ORDERED: M2A capsule fe def    Subjective    Sravanthi Ellison is a 62 y.o. female. she is here today for follow-up.    History of Present Illness    Patient seen on a recheck of her GERD, abdominal pain, IBS. Last seen 09/13/2022, was given Linzess. She states she is taking it. She is on iron. She is on a low dose Linzess. She is still having burning in the upper abdomen despite taking the Prilosec 40 mg daily. Weight is down less than 1 pound since last visit. Last EGD/colonoscopy 12/22/2021 showed esophagitis, gastritis, hemorrhoids. She had negative small bowel biopsy at that time.     Laboratory 11/21/2022 showed normal cholesterol, CMP showed glucose 113 otherwise normal. A1c 6.3%. CBC was ok with a hemoglobin of 14.1, ferritin was 11. Last month Ijeoma Joseph had her increase her iron to twice a day with a recommended repeat at 3 months.     A/P: Patient is still moderately tender epigastrically. Would consider CT imaging. I am concerned about her persistent abdominal pain and iron deficiency. I recommended small bowel evaluation, will do dummy capsule first, presuming this is negative will need to schedule for M2a capsule. She does state she has laboratories ordered by Ijeoma Joseph and will try to get those. Will plan follow-up in 1 month, further pending clinical course and the results of the above.         The following portions of the patient's history were reviewed and updated as appropriate:   Past Medical History:   Diagnosis Date   • Diabetes mellitus (HCC)    • Diarrhea    • Diverticular disease of colon    • Gastroesophageal reflux disease    • Hiatal hernia    • Irritable bowel syndrome    • Nausea    • Right upper quadrant pain    • Traumatic brain injury 06/2017     Past Surgical History:   Procedure Laterality Date   • BREAST BIOPSY     • CARPAL TUNNEL INJECTION     • CATARACT EXTRACTION     • CHOLECYSTECTOMY     •  COLONOSCOPY  12/12/2011    Mild diverticulosis in sigmoid colon. Stool collected for study.   • COLONOSCOPY N/A 12/6/2017    Procedure: COLONOSCOPY--look TI, bx;  Surgeon: Marshal Staley MD;  Location: [facility] ENDOSCOPY;  Service:    • COLONOSCOPY N/A 12/22/2021    Procedure: COLONOSCOPY;  Surgeon: Marshal Staley MD;  Location: [facility] ENDOSCOPY;  Service: Gastroenterology;  Laterality: N/A;   • ENDOSCOPY N/A 12/6/2017    Procedure: ESOPHAGOGASTRODUODENOSCOPY--small bowel biopsies;  Surgeon: Marshal Staley MD;  Location: [facility] ENDOSCOPY;  Service:    • ENDOSCOPY N/A 12/22/2021    Procedure: ESOPHAGOGASTRODUODENOSCOPY WITH DILATATION--bx;  Surgeon: Marshal Staley MD;  Location: [facility] ENDOSCOPY;  Service: Gastroenterology;  Laterality: N/A;   • ENDOSCOPY W/ PEG TUBE PLACEMENT  12/12/2011    Normal hypopharynx, esophagus, symmetrical & patent pylorus. Hiatus hernia in GE junction. Non-erosive gastritis in stomach. Multiple biopsies taken. Normal duodenum. Multiple biopsies taken.   • SHOULDER ARTHROSCOPY     • TONSILLECTOMY     • UPPER GASTROINTESTINAL ENDOSCOPY  12/12/2011   • UPPER GASTROINTESTINAL ENDOSCOPY  12/06/2017   • UPPER GASTROINTESTINAL ENDOSCOPY  12/22/2021     Family History   Problem Relation Age of Onset   • Breast cancer Other    • Diabetes Other    • Heart disease Other    • Hypertension Other    • Skin cancer Other      OB History    No obstetric history on file.       Allergies   Allergen Reactions   • Other GI Intolerance     Regulo gal   • Adhesive Tape Dermatitis     Itching, redness, rash     Social History     Socioeconomic History   • Marital status:    Tobacco Use   • Smoking status: Never   • Smokeless tobacco: Never   Vaping Use   • Vaping Use: Never used   Substance and Sexual Activity   • Alcohol use: No   • Drug use: No   • Sexual activity: Defer     Current Medications:  Prior to Admission medications    Medication Sig Start Date End Date Taking? Authorizing Provider    ALLERGY SERUM INJECTION Inject 1 each under the skin into the appropriate area as directed.    Vishal Hsu MD   azelastine (ASTELIN) 0.1 % nasal spray 2 sprays into the nostril(s) as directed by provider 2 (Two) Times a Day. Use in each nostril as directed 3/4/22   Osiel Menon MD   BinaxNOW COVID-19 Ag Home Test kit TEST AS DIRECTED TODAY 7/15/22   Vishal Hsu MD   busPIRone (BUSPAR) 7.5 MG tablet Take 7.5 mg by mouth 2 (Two) Times a Day. 3/29/18   Vishal Hsu MD   Cyanocobalamin (VITAMIN B-12 ER PO) Take 1,000 mg by mouth Daily.    Vishal Hsu MD   cyclobenzaprine (FLEXERIL) 5 MG tablet Take 5 mg by mouth 3 (Three) Times a Day As Needed. for muscle spams 7/25/22   Vishal Hsu MD   DULoxetine (CYMBALTA) 30 MG capsule take 1 tablet by mouth every morning for anxiety depression 12/20/17   Vishal Hsu MD   empagliflozin (JARDIANCE) 25 MG tablet tablet Take 25 mg by mouth Daily.    Vishal Hsu MD   ferrous sulfate 325 (65 FE) MG EC tablet TAKE 1 TABLET BY MOUTH DAILY WITH BREAKFAST 5/10/22 5/10/23  Abel Laboy PA-C   fluticasone (FLONASE) 50 MCG/ACT nasal spray 2 sprays by Each Nare route Daily. 3/4/22   Osiel Menon MD   HYDROcodone-acetaminophen (NORCO) 7.5-325 MG per tablet Take 1 tablet by mouth Every 6 (Six) Hours As Needed. 8/29/22   Vishal Hsu MD   hydrOXYzine (ATARAX) 10 MG tablet Take 10 mg by mouth 3 (Three) Times a Day As Needed for Itching.    Vishal Hsu MD   hyoscyamine (LEVSIN) 0.125 MG SL tablet Take 1 tablet by mouth Every 4 (Four) Hours As Needed for Cramping (abd pain). 4/26/22   Abel Laboy PA-C   linaclotide (Linzess) 72 MCG capsule capsule Take 1 capsule by mouth Every Morning Before Breakfast. 9/13/22   Abel Laboy PA-C   magnesium oxide (MAG-OX) 400 MG tablet TAKE 1 TABLET(400 MG) BY MOUTH DAILY 6/16/22   Provider, MD Vishal   meloxicam (MOBIC) 15 MG tablet  Take 15 mg by mouth Daily.    Vishal Hsu MD   metFORMIN (GLUCOPHAGE) 1000 MG tablet Take 1,000 mg by mouth 2 (Two) Times a Day With Meals.    Vishal Hsu MD   methylPREDNISolone (MEDROL) 4 MG dose pack follow package directions 7/22/22   Vishal Hsu MD   montelukast (SINGULAIR) 10 MG tablet Take 1 tablet by mouth Daily. 3/4/22   Osiel Menon MD   omeprazole (priLOSEC) 40 MG capsule Take 40 mg by mouth Daily.    Vishal Hsu MD   pioglitazone (ACTOS) 30 MG tablet Take 30 mg by mouth Daily.    Vishal Hsu MD   pravastatin (PRAVACHOL) 20 MG tablet Take 20 mg by mouth Daily.    Vishal Hsu MD   Semaglutide, 1 MG/DOSE, (Ozempic, 1 MG/DOSE,) 4 MG/3ML solution pen-injector 2 mg. 11/16/21   Vishal Hsu MD   tiZANidine (ZANAFLEX) 4 MG tablet  8/29/22   Vishal Hsu MD   traMADol (ULTRAM) 50 MG tablet Take 50 mg by mouth Every 4 (Four) Hours As Needed. 6/8/22   Vishal Hsu MD     Review of Systems  Review of Systems       Objective    BP 95/74   Pulse 101   Wt 64.4 kg (142 lb)   BMI 22.92 kg/m²   Physical Exam  Vitals and nursing note reviewed.   Constitutional:       General: She is not in acute distress.     Appearance: She is well-developed.   HENT:      Head: Normocephalic and atraumatic.   Eyes:      Pupils: Pupils are equal, round, and reactive to light.   Cardiovascular:      Rate and Rhythm: Normal rate and regular rhythm.      Heart sounds: Normal heart sounds.   Pulmonary:      Effort: Pulmonary effort is normal.      Breath sounds: Normal breath sounds.   Abdominal:      General: Bowel sounds are normal. There is no distension or abdominal bruit.      Palpations: Abdomen is soft. Abdomen is not rigid. There is no shifting dullness or mass.      Tenderness: There is abdominal tenderness. There is no guarding or rebound.      Hernia: No hernia is present. There is no hernia in the ventral area.   Musculoskeletal:          General: Normal range of motion.      Cervical back: Normal range of motion.   Skin:     General: Skin is warm and dry.   Neurological:      Mental Status: She is alert and oriented to person, place, and time.   Psychiatric:         Behavior: Behavior normal.         Thought Content: Thought content normal.         Judgment: Judgment normal.       Assessment & Plan      1. Other iron deficiency anemia    2. Epigastric pain    3. Gastroesophageal reflux disease with esophagitis without hemorrhage    4. Pain of upper abdomen    .   Diagnoses and all orders for this visit:    1. Other iron deficiency anemia (Primary)  -     Cancel: Endoscopy, GI With Capsule  -     XR Abdomen KUB  -     Cancel: Endoscopy, GI With Capsule; Future  -     simethicone (MYLICON) 40 MG/0.6ML drops 333 mg  -     Case Request; Standing  -     dextrose 5 % and sodium chloride 0.45 % infusion  -     Case Request    2. Epigastric pain  -     Cancel: Endoscopy, GI With Capsule  -     XR Abdomen KUB  -     Case Request; Standing  -     dextrose 5 % and sodium chloride 0.45 % infusion  -     Case Request    3. Gastroesophageal reflux disease with esophagitis without hemorrhage    4. Pain of upper abdomen  -     Cancel: Endoscopy, GI With Capsule  -     XR Abdomen KUB  -     Case Request; Standing  -     dextrose 5 % and sodium chloride 0.45 % infusion  -     Case Request    Other orders  -     Obtain Informed Consent; Standing  -     Post Capsule Ingestion Diet Orders; Standing  -     Nurse to \"Check Out\"  Patient Once Pill Ingested Successfully and Patient Stable.; Standing  -     Discharge patient; Standing  -     Post Capsule Ingestion Ambulation Instructions; Standing  -     Post Capsule Ingestion Teaching; Standing  -     Follow Anesthesia Guidelines / Protocol; Future  -     Obtain Informed Consent; Future  -     Obtain Informed Consent; Standing  -     POC Glucose Once; Standing        Orders placed during this encounter  include:  Orders Placed This Encounter   Procedures   • XR Abdomen KUB     Order Specific Question:   Reason for Exam:     Answer:   M2A patency capsule, please note location   • Obtain Informed Consent     Standing Status:   Future     Order Specific Question:   Informed Consent Given For     Answer:   ESOPHAGOGASTRODUODENOSCOPY       Medications prescribed:  No orders of the defined types were placed in this encounter.      Requested Prescriptions      No prescriptions requested or ordered in this encounter       Review and/or summary of lab tests, radiology, procedures, medications. Review and summary of old records and obtaining of history. The risks and benefits of my recommendations, as well as other treatment options were discussed with the patient today. Questions were answered.    Follow-up: Return in about 1 month (around 1/20/2023), or if symptoms worsen or fail to improve.     CAPSULE ENDOSCOPY M2A (N/A)      This document has been electronically signed by Abel Laboy PA-C on January 12, 2023 13:36 CST      Results for orders placed or performed during the hospital encounter of 12/22/21   Tissue Pathology Exam    Specimen: A: Small Intestine, Duodenum; Tissue    B: Gastric, Antrum; Tissue    C: Esophagus, Distal; Tissue    D: Large Intestine; Tissue   Result Value Ref Range    Case Report       Surgical Pathology Report                         Case: [id number]                                  Authorizing Provider:  Marshal Staley MD        Collected:           12/22/2021 11:27 AM          Ordering Location:     [facility]   Received:            12/22/2021 01:12 PM                                 [facility]                                                     Pathologist:           Raphael Noe MD                                                           Specimens:   1) - Small Intestine, Duodenum, small bowel bx                                                      2)  - Gastric, Antrum, antrum bx                                                                     3) - Esophagus, Distal, distal esophagus bx                                                         4) - Large Intestine, colonic mucosa bx                                                    Final Diagnosis       SEE SCANNED REPORT       POC Glucose Once    Specimen: Blood   Result Value Ref Range    Glucose 79 70 - 130 mg/dL   Results for orders placed or performed in visit on 12/20/21   COVID-19, [facility] IN-HOUSE, NP SWAB IN TRANSPORT MEDIA 8-10 HR TAT - Swab, Nasopharynx    Specimen: Nasopharynx; Swab   Result Value Ref Range    COVID19 Not Detected Not Detected - Ref. Range   Results for orders placed or performed in visit on 05/01/18   Alpha - Gal Panel    Specimen: Blood   Result Value Ref Range    Beef 0.16 <0.35 kU/L    Class Description 0/1     Lamb <0.10 <0.35 kU/L    Class Interpretation 0     Pork <0.10 <0.35 kU/L    Class Interpretation 0     Alpha Gal IgE 1.34 (H) <0.35 kU/L   Results for orders placed or performed during the hospital encounter of 12/06/17   Tissue Pathology Exam - Tissue, Small Intestine, Duodenum    Specimen: A: Small Intestine, Duodenum; Tissue    B: Gastric, Antrum; Tissue    C: Small Intestine, Ileum; Tissue    D: Large Intestine; Tissue   Result Value Ref Range    Case Report       Surgical Pathology Report                         Case: [id number]                                  Authorizing Provider:  Marshal Staley MD         Collected:           12/06/2017 10:50 AM          [facility]ing [facility]     [facility]             Received:            12/06/2017 12:01 PM                                 [facility]                                                     Pathologist:           Jared Kelley MD                                                          Specimens:   1) - Small Intestine, Duodenum, small bowel bx                                                      2)  - Gastric, Antrum, antrum bx                                                                     3) - Small Intestine, Ileum, ti bx                                                                  4) - Large Intestine, colonic mucosa bx                                                    Final Diagnosis       1.  MUCOSA, DUODENUM:  NO SIGNIFICANT HISTOLOGIC ABNORMALITY.    2.  MUCOSA, ANTRUM OF STOMACH:  CHRONIC GASTRITIS WITH BENIGN LYMPHOID AGGREGATES AND CONGESTION.  NEGATIVE FOR HELICOBACTER PYLORI (HP IMMUNOSTAIN).    3.  MUCOSA, TERMINAL ILEUM:  NO SIGNIFICANT HISTOLOGIC ABNORMALITY.    4.  MUCOSA, COLON:  NO SIGNIFICANT HISTOLOGIC ABNORMALITY.      Comment       Helicobacter pylori (HP) immunostain is performed because an appropriate inflammatory milieu is present and organisms are not seen on H & E stained slides.    HP immunostain was developed and its performance characteristics [facility].  It has not been cleared or approved by the U.S. Food and Drug Administration.  The FDA has determined that such clearance or approval is not necessary.  This test is used for clinical purposes.  It should not be regarded as investigational or for research.  This laboratory is certified under the Clinical Laboratory Improvement Amendments of 1988 (CLIA-88) as qualified to perform high complexity clinical laboratory testing.            Gross Description       Received for examination are 4 containers, each of which have nodular bits of white soft tissue measuring 0.3-0.5 cc in aggregate.  All specimens are embedded as labeled.  1A duodenum; 2A antrum of stomach; 3A terminal ileum; 4A mucosa of colon.      Embedded Images     POC Glucose Fingerstick    Specimen: Blood   Result Value Ref Range    Glucose 81 70 - 130 mg/dL   Results for orders placed or performed in visit on 11/28/17   Gastrointestinal Panel, PCR - Stool, Per Rectum    Specimen: Per Rectum; Stool   Result  Value Ref Range    Campylobacter Not Detected Not Detected    Clostridium difficile (toxin A/B) Not Detected Not Detected, NA formed stool, C diff not applicable on patient less than one year of age    Plesiomonas shigelloides Not Detected Not Detected    Salmonella Not Detected Not Detected    Vibrio Not Detected Not Detected    Vibrio cholerae Not Detected Not Detected    Yersinia enterocolitica Not Detected Not Detected    Enteroaggregative E. coli (EAEC) Not Detected Not Detected    Enteropathogenic E. coli (EPEC) Not Detected Not Detected    Enterotoxigenic E. coli (ETEC) lt/st Not Detected Not Detected    Shiga-like toxin-producing E. coli (STEC) stx1/stx2 Not Detected Not Detected    E. coli O157 Not Detected Not Detected    Shigella/Enteroinvasive E. coli (EIEC) Not Detected Not Detected    Cryptosporidium Not Detected Not Detected    Cyclospora cayetanensis Not Detected Not Detected    Entamoeba histolytica Not Detected Not Detected    Giardia lamblia Not Detected Not Detected    Adenovirus F40/41 Not Detected Not Detected    Astrovirus Not Detected Not Detected    Norovirus GI/GII Not Detected Not Detected    Rotavirus A Not Detected Not Detected    Sapovirus (I, II, IV or V) Not Detected Not Detected   Glia(IgA / G) & TTG(IgA / G)    Specimen: Blood   Result Value Ref Range    Gliadin Deamidated Peptide Ab, IgA 5 0 - 19 units    Deaminated Gliadin Ab IgG 4 0 - 19 units    Tissue Transglutaminase IgA <2 0 - 3 U/mL    Tissue Transglutaminase IgG <2 0 - 5 U/mL   Allergens (12) Foods    Specimen: Blood   Result Value Ref Range    Class Description Comment     Egg White <0.10 Class 0 kU/L    Milk, Cow's <0.10 Class 0 kU/L    CodFish <0.10 Class 0 kU/L    Sesame Seed <0.10 Class 0 kU/L    Peanut <0.10 Class 0 kU/L    Soybean <0.10 Class 0 kU/L    Hazelnut <0.10 Class 0 kU/L    Shrimp <0.10 Class 0 kU/L    Scallop <0.10 Class 0 kU/L    Gluten <0.10 Class 0 kU/L    [employer] <0.10 Class 0 kU/L    Wheat <0.10 Class 0  kU/L   Trypsin    Specimen: Blood   Result Value Ref Range    Trypsin 471 169 - 773 ng/mL   Lipase    Specimen: Blood   Result Value Ref Range    Lipase 104 23 - 300 U/L   Amylase    Specimen: Blood   Result Value Ref Range    Amylase 60 25 - 140 U/L   Results for orders placed or performed in visit on 11/07/16    COLONOSCOPY   Result Value Ref Range     Colonoscopy completed; ordered by Dr. Ijeoma Joseph    Results for orders placed or performed in visit on 12/12/11   Converted Surgical Pathology    Specimen: Tissue   Result Value Ref Range    Spec Descr 1 SPECIMEN(S): A SMALL BOWEL BIOPSY     Specimen description 2 SPECIMEN(S): B GASTRIC BIOPSY     Specimen description 3 SPECIMEN(S): C TERMINAL ILEUM BIOPSY     Specimen description 4 SPECIMEN(S): D MUCOSAL COLON BIOPSY     Preoperative Diagnosis   PREOPERATIVE DIAGNOSIS:  Diarrhea       Postoperative Diagnosis         POSTOPERATIVE DIAGNOSIS:  Small bowel (A), gastric mucosa (B), terminal ileum (C), colonic mucosa  (D)      Gross Description         GROSS DESCRIPTION:  In 4 containers, each of these show mucosal biopsies measuring up to 0.3  cm in greatest dimension.  Embedded accordingly.  (A) small bowel, (B)  gastric mucosa, (C) terminal ileum, (D) colonic mucosa.      Final Diagnosis         FINAL DIAGNOSIS:  A.  SMALL BOWEL, MUCOSAL BIOPSY:             NO SIGNIFICANT PATHOLOGIC DIAGNOSIS.  B.  GASTRIC MUCOSAL BIOPSY:             MILD CHRONIC INFLAMMATION.             NO EVIDENCE OF HELICOBACTER PYLORI (IP STAIN PERFORMED).  C.  TERMINAL ILEUM, MUCOSAL BIOPSY:             NO SIGNIFICANT PATHOLOGIC DIAGNOSIS.  D.  COLONIC MUCOSAL BIOPSY, RANDOM:             NO SIGNIFICANT PATHOLOGIC DIAGNOSIS.      Comment         COMMENT:  Helicobacter immunoperoxidase stain performed on block B.  HP immunostain was developed and its performance characteristics  determined by [facility].  It has not been  cleared or approved by the U.S. Food  and Drug Administration.  The FDA  has determined that such clearance or approval is not necessary.  This  test is used for clinical purposes.  It should not be regarded as  investigational or for research.  This laboratory is certified under the  Clinical Laboratory Improvement Amendments of 1988 (CLIA-88) as  qualified to perform high complexity clinical laboratory testing.      CONVERTED (HISTORICAL) FINAL PATHOLOGIST       Diagnostician:  BESSIE ESTES M.D.  Pathologist  Electronically Signed 12/13/2011      Diagnosis Code   DIAGNOSIS CODE:  4      Results for orders placed or performed in visit on 11/25/03   Converted Surgical Pathology    Specimen: Tissue   Result Value Ref Range    Spec Descr 1 SPECIMEN(S): A GASTRIC BIOPSY     Gross Description         GROSS DESCRIPTION:  The specimen is labeled \"gastric mucosa\" and consists of two tan  fragments measuring 0.8 x 0.2 x 0.3 cm together.  Totally submitted.      Final Diagnosis         FINAL DIAGNOSIS:  GASTRIC MUCOSA, BIOPSY:            CHRONIC GASTRITIS SHOWING MINIMAL ACTIVITY.            A SPECIAL STAIN FOR HELICOBACTER PYLORI IS NEGATIVE.  [facility]    DIAGNOSIS CODE:  6      Comment   CLINICAL DIAGNOSIS:  Gastric mucosa biopsy       CONVERTED (HISTORICAL) FINAL PATHOLOGIST       Diagnostician:  JOY ALVARADO M.D.  Pathologist  Electronically Signed 11/28/2003     Results for orders placed or performed in visit on 04/01/03   Converted Surgical Pathology    Specimen: Tissue   Result Value Ref Range    Spec Descr 1 SPECIMEN(S): A MUCOSAL COLON BIOPSY     Gross Description         GROSS DESCRIPTION:  The container is labeled \"mucosa, colon (cold biopsy)\" and has nodular  bits of white soft tissue 0.6 cc in aggregate.  The entire specimen is  embedded.      Final Diagnosis         FINAL DIAGNOSIS:  MUCOSA, COLON:              FRAGMENTS OF MUCOSA OF THE COLON WITH A FEW BENIGN                   LYMPHOID NODULES.  [facility]    DIAGNOSIS CODE:  2      Comment   CLINICAL  DIAGNOSIS:  Diarrhea       CONVERTED (HISTORICAL) FINAL PATHOLOGIST       Diagnostician:  ALVARO HEREDIA M.D.  Pathologist  Electronically Signed 04/02/2003

## 2023-01-03 RX ORDER — LINACLOTIDE 72 UG/1
CAPSULE, GELATIN COATED ORAL
Qty: 30 CAPSULE | Refills: 1 | Status: SHIPPED | OUTPATIENT
Start: 2023-01-03

## 2023-01-09 RX ORDER — SIMETHICONE 20 MG/.3ML
333 EMULSION ORAL ONCE
Status: CANCELLED | OUTPATIENT
Start: 2023-01-09 | End: 2023-01-09

## 2023-01-12 RX ORDER — DEXTROSE AND SODIUM CHLORIDE 5; .45 G/100ML; G/100ML
30 INJECTION, SOLUTION INTRAVENOUS CONTINUOUS PRN
Status: CANCELLED | OUTPATIENT
Start: 2023-01-12

## 2023-01-16 ENCOUNTER — HOSPITAL ENCOUNTER (OUTPATIENT)
Dept: GASTROENTEROLOGY | Facility: HOSPITAL | Age: 63
Discharge: HOME | End: 2023-01-16
Admitting: INTERNAL MEDICINE
Payer: COMMERCIAL

## 2023-01-16 PROCEDURE — C1889 IMPLANT/INSERT DEVICE, NOC: HCPCS

## 2023-01-16 PROCEDURE — 91110 GI TRC IMG INTRAL ESOPH-ILE: CPT

## 2023-01-16 NOTE — NURSING NOTE
Room arrival time________0805_______________________    Does patient have a pacemaker or implantable device (if yes, this is contraindicated) :       B/P:102/58    Heart Rate:92    O2 Sat %:96    Temp:96.8      Pain:none            If yes, location and VAS _____________________________    Allergies:none  NPO status: midnight  Pillcam Lot# :[id number]  Pillcam expiration date: 12/27/2023    Patient swallowed capsule @ ___0815__________      Patient arrived to Endoscopy department ambulatory, alert and oriented.  Procedure explained to patient, patient verbalized understanding.  Consent obtained.  Patient swallowed capsule without difficulty.  Dietary instructions given and patient instructed on time to return to the Endoscopy department.    Discharged from endo time_________________________

## 2023-02-01 ENCOUNTER — LAB (OUTPATIENT)
Dept: LAB | Facility: HOSPITAL | Age: 63
End: 2023-02-01
Payer: COMMERCIAL

## 2023-02-01 ENCOUNTER — OFFICE VISIT (OUTPATIENT)
Dept: GASTROENTEROLOGY | Facility: CLINIC | Age: 63
End: 2023-02-01
Payer: COMMERCIAL

## 2023-02-01 VITALS
OXYGEN SATURATION: 98 % | SYSTOLIC BLOOD PRESSURE: 106 MMHG | WEIGHT: 140.8 LBS | HEART RATE: 92 BPM | BODY MASS INDEX: 22.63 KG/M2 | DIASTOLIC BLOOD PRESSURE: 58 MMHG | HEIGHT: 66 IN

## 2023-02-01 DIAGNOSIS — D50.8 OTHER IRON DEFICIENCY ANEMIA: Primary | ICD-10-CM

## 2023-02-01 DIAGNOSIS — K63.89 MASS OF SMALL INTESTINE: ICD-10-CM

## 2023-02-01 DIAGNOSIS — R10.13 EPIGASTRIC PAIN: ICD-10-CM

## 2023-02-01 DIAGNOSIS — K21.00 GASTROESOPHAGEAL REFLUX DISEASE WITH ESOPHAGITIS WITHOUT HEMORRHAGE: ICD-10-CM

## 2023-02-01 DIAGNOSIS — K59.00 CONSTIPATION, UNSPECIFIED CONSTIPATION TYPE: ICD-10-CM

## 2023-02-01 LAB
ALPHA-FETOPROTEIN: 5.86 NG/ML (ref 0–8.3)
CEA SERPL-MCNC: 2.34 NG/ML

## 2023-02-01 PROCEDURE — 86003 ALLG SPEC IGE CRUDE XTRC EA: CPT | Performed by: PHYSICIAN ASSISTANT

## 2023-02-01 PROCEDURE — 82785 ASSAY OF IGE: CPT | Performed by: PHYSICIAN ASSISTANT

## 2023-02-01 PROCEDURE — 86008 ALLG SPEC IGE RECOMB EA: CPT | Performed by: PHYSICIAN ASSISTANT

## 2023-02-01 PROCEDURE — 82378 CARCINOEMBRYONIC ANTIGEN: CPT | Performed by: PHYSICIAN ASSISTANT

## 2023-02-01 PROCEDURE — 86316 IMMUNOASSAY TUMOR OTHER: CPT | Performed by: PHYSICIAN ASSISTANT

## 2023-02-01 PROCEDURE — 86364 TISS TRNSGLTMNASE EA IG CLAS: CPT | Performed by: PHYSICIAN ASSISTANT

## 2023-02-01 PROCEDURE — 86258 DGP ANTIBODY EACH IG CLASS: CPT | Performed by: PHYSICIAN ASSISTANT

## 2023-02-01 PROCEDURE — 99214 OFFICE O/P EST MOD 30 MIN: CPT | Performed by: PHYSICIAN ASSISTANT

## 2023-02-01 PROCEDURE — 82105 ALPHA-FETOPROTEIN SERUM: CPT | Performed by: PHYSICIAN ASSISTANT

## 2023-02-01 PROCEDURE — 36415 COLL VENOUS BLD VENIPUNCTURE: CPT | Performed by: PHYSICIAN ASSISTANT

## 2023-02-01 PROCEDURE — 82941 ASSAY OF GASTRIN: CPT | Performed by: PHYSICIAN ASSISTANT

## 2023-02-01 RX ORDER — SUCRALFATE 1 G/1
1 TABLET ORAL 2 TIMES DAILY
Qty: 60 TABLET | Refills: 3 | Status: SHIPPED | OUTPATIENT
Start: 2023-02-01 | End: 2023-04-03

## 2023-02-01 NOTE — PROGRESS NOTES
Chief Complaint   Patient presents with   • Follow-up     After Pillcam   • Anemia       ENDO PROCEDURE ORDERED:    Subjective    Sravanthi Ellison is a 62 y.o. female. she is here today for follow-up.    History of Present Illness    Patient is seen on a recheck of her anemia, GERD, IBS-C. Last seen on 12/20/2022 and was scheduled for M2A capsule. This was done on 01/16/2023. Transit time was 6 hours. There is a questionable polypoid lesion of the terminal ileum. Could be normal ileocecal valve or polyp. I did review the images with the patient. It is difficult to determine. We will discuss with Dr. Ireland. She states when she eats she feels like she cannot do anything. She hurts in the epigastric region and has burning. She is on Prilosec 40 mg daily. Denied nausea, vomiting or dysphagia. She is on low-dose Linzess for constipation. Weight is down 1.2 pounds since last visit. Last EGD/colonoscopy on 12/22/2021 showed esophagitis, gastritis, hemorrhoids. She had had a patency capsule, negative KUB on 01/07/2023 with the capsules in the right lower quadrant and the cecum prior to having her capsule done.     ASSESSMENT/PLAN:  Patient with questionable lesion of the terminal ileum. Consider malignant or benign etiologies. I did recommend further testing to include a serum gastrin, urinary 5-HIAA, chromogranin A, CEA, AFP. We will check alpha-gal recurrent GI distress panel. Would consider CT imaging. We will try adding Carafate b.i.d. Continue on the Prilosec. She states she is due for follow-up and laboratories with Dr. Botello, her endocrinologist. We will plan follow-up in 1 month, sooner if needed. Further pending clinical course and the results of the above. Discussed possibly repeating a colonoscopy but we will review with Dr. Ireland. Further pending clinical course and the results of the above.      The following portions of the patient's history were reviewed and updated as appropriate:   Past Medical  History:   Diagnosis Date   • Diabetes mellitus (HCC)    • Diarrhea    • Diverticular disease of colon    • Gastroesophageal reflux disease    • Hiatal hernia    • Irritable bowel syndrome    • Nausea    • Right upper quadrant pain    • Traumatic brain injury 06/2017     Past Surgical History:   Procedure Laterality Date   • BREAST BIOPSY     • CARPAL TUNNEL INJECTION     • CATARACT EXTRACTION     • CHOLECYSTECTOMY     • COLONOSCOPY  12/12/2011    Mild diverticulosis in sigmoid colon. Stool collected for study.   • COLONOSCOPY N/A 12/6/2017    Procedure: COLONOSCOPY--look TI, bx;  Surgeon: Marshal Staley MD;  Location: [facility] ENDOSCOPY;  Service:    • COLONOSCOPY N/A 12/22/2021    Procedure: COLONOSCOPY;  Surgeon: Marshal Staley MD;  Location: [facility] ENDOSCOPY;  Service: Gastroenterology;  Laterality: N/A;   • ENDOSCOPY N/A 12/6/2017    Procedure: ESOPHAGOGASTRODUODENOSCOPY--small bowel biopsies;  Surgeon: Marshal Staley MD;  Location: [facility] ENDOSCOPY;  Service:    • ENDOSCOPY N/A 12/22/2021    Procedure: ESOPHAGOGASTRODUODENOSCOPY WITH DILATATION--bx;  Surgeon: Marshal Staley MD;  Location: [facility] ENDOSCOPY;  Service: Gastroenterology;  Laterality: N/A;   • ENDOSCOPY W/ PEG TUBE PLACEMENT  12/12/2011    Normal hypopharynx, esophagus, symmetrical & patent pylorus. Hiatus hernia in GE junction. Non-erosive gastritis in stomach. Multiple biopsies taken. Normal duodenum. Multiple biopsies taken.   • SHOULDER ARTHROSCOPY     • TONSILLECTOMY     • UPPER GASTROINTESTINAL ENDOSCOPY  12/12/2011   • UPPER GASTROINTESTINAL ENDOSCOPY  12/06/2017   • UPPER GASTROINTESTINAL ENDOSCOPY  12/22/2021     Family History   Problem Relation Age of Onset   • Breast cancer Other    • Diabetes Other    • Heart disease Other    • Hypertension Other    • Skin cancer Other      OB History    No obstetric history on file.       Allergies   Allergen Reactions   • Other GI Intolerance     Regulo gal   • Adhesive Tape Dermatitis     Itching,  redness, rash     Social History     Socioeconomic History   • Marital status:    Tobacco Use   • Smoking status: Never   • Smokeless tobacco: Never   Vaping Use   • Vaping Use: Never used   Substance and Sexual Activity   • Alcohol use: No   • Drug use: No   • Sexual activity: Defer     Current Medications:  Prior to Admission medications    Medication Sig Start Date End Date Taking? Authorizing Provider   ALLERGY SERUM INJECTION Inject 1 each under the skin into the appropriate area as directed.   Yes Vishal Hsu MD   azelastine (ASTELIN) 0.1 % nasal spray 2 sprays into the nostril(s) as directed by provider 2 (Two) Times a Day. Use in each nostril as directed 3/4/22  Yes Osiel Menon MD   busPIRone (BUSPAR) 7.5 MG tablet Take 7.5 mg by mouth 2 (Two) Times a Day. 3/29/18  Yes Vishal Hsu MD   Cyanocobalamin (VITAMIN B-12 ER PO) Take 1,000 mg by mouth Daily.   Yes Vishal Hsu MD   cyclobenzaprine (FLEXERIL) 5 MG tablet Take 5 mg by mouth 3 (Three) Times a Day As Needed. for muscle spams 7/25/22  Yes Vishal Hsu MD   DULoxetine (CYMBALTA) 30 MG capsule take 1 tablet by mouth every morning for anxiety depression 12/20/17  Yes Vishal Hsu MD   empagliflozin (JARDIANCE) 25 MG tablet tablet Take 25 mg by mouth Daily.   Yes Vishal Hsu MD   ferrous sulfate 325 (65 FE) MG EC tablet TAKE 1 TABLET BY MOUTH DAILY WITH BREAKFAST 5/10/22 5/10/23 Yes Abel Laboy PA-C   fluticasone (FLONASE) 50 MCG/ACT nasal spray 2 sprays by Each Nare route Daily. 3/4/22  Yes Osiel Menon MD   hydrOXYzine (ATARAX) 10 MG tablet Take 10 mg by mouth 3 (Three) Times a Day As Needed for Itching.   Yes Vishal Hsu MD   hyoscyamine (LEVSIN) 0.125 MG SL tablet Take 1 tablet by mouth Every 4 (Four) Hours As Needed for Cramping (abd pain). 4/26/22  Yes Abel Laboy PA-C   Linzess 72 MCG capsule capsule TAKE 1 CAPSULE BY MOUTH EVERY MORNING  "BEFORE BREAKFAST 1/3/23  Yes Abel Laboy PA-C   magnesium oxide (MAG-OX) 400 MG tablet TAKE 1 TABLET(400 MG) BY MOUTH DAILY 6/16/22  Yes Vishal Hsu MD   meloxicam (MOBIC) 15 MG tablet Take 15 mg by mouth Daily.   Yes Vishal Hsu MD   metFORMIN (GLUCOPHAGE) 1000 MG tablet Take 1,000 mg by mouth 2 (Two) Times a Day With Meals.   Yes Vishal Hsu MD   montelukast (SINGULAIR) 10 MG tablet Take 1 tablet by mouth Daily. 3/4/22  Yes Osiel Menon MD   omeprazole (priLOSEC) 40 MG capsule Take 40 mg by mouth Daily.   Yes Vishal Hsu MD   pioglitazone (ACTOS) 30 MG tablet Take 30 mg by mouth Daily.   Yes Vishal Hsu MD   pravastatin (PRAVACHOL) 20 MG tablet Take 20 mg by mouth Daily.   Yes Vishal Hsu MD   Semaglutide, 1 MG/DOSE, (Ozempic, 1 MG/DOSE,) 4 MG/3ML solution pen-injector 2 mg. 11/16/21  Yes Vishal Hsu MD   HYDROcodone-acetaminophen (NORCO) 7.5-325 MG per tablet Take 1 tablet by mouth Every 6 (Six) Hours As Needed. 8/29/22   Vishal Hsu MD   methylPREDNISolone (MEDROL) 4 MG dose pack follow package directions 7/22/22   Vishal Hsu MD   tiZANidine (ZANAFLEX) 4 MG tablet  8/29/22   Vishal Hsu MD   traMADol (ULTRAM) 50 MG tablet Take 50 mg by mouth Every 4 (Four) Hours As Needed. 6/8/22   Provider, Historical, MD   BinaxNOW COVID-19 Ag Home Test kit TEST AS DIRECTED TODAY 7/15/22 2/1/23  Vishal Hsu MD     Review of Systems  Review of Systems       Objective    /58 (BP Location: Left arm, Patient Position: Sitting)   Pulse 92   Ht 167.6 cm (66\")   Wt 63.9 kg (140 lb 12.8 oz)   SpO2 98%   BMI 22.73 kg/m²   Physical Exam  Vitals and nursing note reviewed.   Constitutional:       General: She is not in acute distress.     Appearance: She is well-developed. She is ill-appearing.   HENT:      Head: Normocephalic and atraumatic.   Eyes:      Pupils: Pupils are equal, round, and reactive " to light.   Cardiovascular:      Rate and Rhythm: Normal rate and regular rhythm.      Heart sounds: Normal heart sounds.   Pulmonary:      Effort: Pulmonary effort is normal.      Breath sounds: Normal breath sounds.   Abdominal:      General: Bowel sounds are normal. There is no distension or abdominal bruit.      Palpations: Abdomen is soft. Abdomen is not rigid. There is no shifting dullness or mass.      Tenderness: There is abdominal tenderness. There is no guarding or rebound.      Hernia: No hernia is present. There is no hernia in the ventral area.   Musculoskeletal:         General: Normal range of motion.      Cervical back: Normal range of motion.   Skin:     General: Skin is warm and dry.   Neurological:      Mental Status: She is alert and oriented to person, place, and time.   Psychiatric:         Behavior: Behavior normal.         Thought Content: Thought content normal.         Judgment: Judgment normal.       Assessment & Plan      1. Other iron deficiency anemia    2. Epigastric pain    3. Gastroesophageal reflux disease with esophagitis without hemorrhage    4. Constipation, unspecified constipation type    5. Mass of small intestine    .   Diagnoses and all orders for this visit:    1. Other iron deficiency anemia (Primary)  -     sucralfate (Carafate) 1 g tablet; Take 1 tablet by mouth 2 (Two) Times a Day.  Dispense: 60 tablet; Refill: 3  -     Gastrin  -     CEA  -     AFP Tumor Marker  -     5 HIAA, Urine, Quantitative, 24 Hour - Urine, Clean Catch  -     Chromogranin A  -     Recurrent Gastrointestinal Distress  -     Alpha-Gal IgE Panel    2. Epigastric pain  -     sucralfate (Carafate) 1 g tablet; Take 1 tablet by mouth 2 (Two) Times a Day.  Dispense: 60 tablet; Refill: 3  -     Gastrin  -     CEA  -     AFP Tumor Marker  -     5 HIAA, Urine, Quantitative, 24 Hour - Urine, Clean Catch  -     Chromogranin A  -     Recurrent Gastrointestinal Distress  -     Alpha-Gal IgE Panel    3.  Gastroesophageal reflux disease with esophagitis without hemorrhage    4. Constipation, unspecified constipation type    5. Mass of small intestine  -     sucralfate (Carafate) 1 g tablet; Take 1 tablet by mouth 2 (Two) Times a Day.  Dispense: 60 tablet; Refill: 3  -     Gastrin  -     CEA  -     AFP Tumor Marker  -     5 HIAA, Urine, Quantitative, 24 Hour - Urine, Clean Catch  -     Chromogranin A  -     Recurrent Gastrointestinal Distress  -     Alpha-Gal IgE Panel        Orders placed during this encounter include:  Orders Placed This Encounter   Procedures   • Gastrin     Order Specific Question:   Release to patient     Answer:   Routine Release   • CEA     Order Specific Question:   Release to patient     Answer:   Routine Release   • AFP Tumor Marker     Order Specific Question:   Release to patient     Answer:   Routine Release   • 5 HIAA, Urine, Quantitative, 24 Hour - Urine, Clean Catch   • Chromogranin A     Order Specific Question:   Release to patient     Answer:   Routine Release   • Recurrent Gastrointestinal Distress     Order Specific Question:   Release to patient     Answer:   Routine Release   • Alpha-Gal IgE Panel   • Allergens (12) Foods     Order Specific Question:   Release to patient     Answer:   Routine Release   • Glia(IgA / G) & TTG(IgA / G)     Order Specific Question:   Release to patient     Answer:   Routine Release       Medications prescribed:  New Medications Ordered This Visit   Medications   • sucralfate (Carafate) 1 g tablet     Sig: Take 1 tablet by mouth 2 (Two) Times a Day.     Dispense:  60 tablet     Refill:  3     Discontinued Medications       Reason for Discontinue     BinaxNOW COVID-19 Ag Home Test kit    *Therapy completed        Requested Prescriptions     Signed Prescriptions Disp Refills   • sucralfate (Carafate) 1 g tablet 60 tablet 3     Sig: Take 1 tablet by mouth 2 (Two) Times a Day.       Review and/or summary of lab tests, radiology, procedures, medications.  Review and summary of old records and obtaining of history. The risks and benefits of my recommendations, as well as other treatment options were discussed with the patient today. Questions were answered.    Follow-up: Return in about 1 month (around 3/1/2023), or if symptoms worsen or fail to improve.     * Surgery not found *      This document has been electronically signed by Abel Laboy PA-C on February 13, 2023 18:30 CST      Results for orders placed or performed in visit on 02/01/23   Alpha-Gal IgE Panel    Specimen: Blood   Result Value Ref Range    Class Description Comment     IgE 167 6 - 495 IU/mL    [id number] Alpha-Gal 1.65 (A) Class III kU/L    Beef 0.11 (A) Class 0/I kU/L    Pork <0.10 Class 0 kU/L    Lamb 0.11 (A) Class 0/I kU/L   Glia(IgA / G) & TTG(IgA / G)    Specimen: Blood   Result Value Ref Range    Gliadin Deamidated Peptide Ab, IgA 5 0 - 19 units    Deaminated Gliadin Ab IgG 8 0 - 19 units    Tissue Transglutaminase IgA <2 0 - 3 U/mL    Tissue Transglutaminase IgG 5 0 - 5 U/mL   Allergens (12) Foods    Specimen: Blood   Result Value Ref Range    Class Description Comment     Egg White <0.10 Class 0 kU/L    Milk, Cow's <0.10 Class 0 kU/L    CodFish <0.10 Class 0 kU/L    Sesame Seed <0.10 Class 0 kU/L    Peanut <0.10 Class 0 kU/L    Soybean <0.10 Class 0 kU/L    Hazelnut <0.10 Class 0 kU/L    Shrimp <0.10 Class 0 kU/L    Scallop <0.10 Class 0 kU/L    Gluten <0.10 Class 0 kU/L    [place] <0.10 Class 0 kU/L    Wheat <0.10 Class 0 kU/L   Chromogranin A    Specimen: Blood   Result Value Ref Range    CHROMOGRANIN A 597.9 (H) 0.0 - 101.8 ng/mL   AFP Tumor Marker    Specimen: Blood   Result Value Ref Range    ALPHA-FETOPROTEIN 5.86 0 - 8.3 ng/mL   Gastrin    Specimen: Blood   Result Value Ref Range    Gastrin 159 (H) 0 - 115 pg/mL   CEA    Specimen: Blood   Result Value Ref Range    CEA 2.34 ng/mL   Results for orders placed or performed during the hospital encounter of 12/22/21   Tissue Pathology  Exam    Specimen: A: Small Intestine, Duodenum; Tissue    B: Gastric, Antrum; Tissue    C: Esophagus, Distal; Tissue    D: Large Intestine; Tissue   Result Value Ref Range    Case Report       Surgical Pathology Report                         Case: [id number]                                  Authorizing Provider:  Marshal Staley MD        Collected:           12/22/2021 11:27 AM          Ordering Location:     [facility]   Received:            12/22/2021 01:12 PM                                 [facility]                                                     Pathologist:           Raphael Noe MD                                                           Specimens:   1) - Small Intestine, Duodenum, small bowel bx                                                      2) - Gastric, Antrum, antrum bx                                                                     3) - Esophagus, Distal, distal esophagus bx                                                         4) - Large Intestine, colonic mucosa bx                                                    Final Diagnosis       SEE SCANNED REPORT       POC Glucose Once    Specimen: Blood   Result Value Ref Range    Glucose 79 70 - 130 mg/dL   Results for orders placed or performed in visit on 12/20/21   COVID-19, [facility] IN-HOUSE, NP SWAB IN TRANSPORT MEDIA 8-10 HR TAT - Swab, Nasopharynx    Specimen: Nasopharynx; Swab   Result Value Ref Range    COVID19 Not Detected Not Detected - Ref. Range   Results for orders placed or performed in visit on 05/01/18   Alpha - Gal Panel    Specimen: Blood   Result Value Ref Range    Beef 0.16 <0.35 kU/L    Class Description 0/1     Lamb <0.10 <0.35 kU/L    Class Interpretation 0     Pork <0.10 <0.35 kU/L    Class Interpretation 0     Alpha Gal IgE 1.34 (H) <0.35 kU/L   Results for orders placed or performed during the hospital encounter of 12/06/17   Tissue Pathology Exam - Tissue, Small Intestine, Duodenum     Specimen: A: Small Intestine, Duodenum; Tissue    B: Gastric, Antrum; Tissue    C: Small Intestine, Ileum; Tissue    D: Large Intestine; Tissue   Result Value Ref Range    Case Report       Surgical Pathology Report                         Case: [id number]                                  Authorizing Provider:  Marshal Staley MD         Collected:           12/06/2017 10:50 AM          Ordering Location:     [facility]             Received:            12/06/2017 12:01 PM                                 [facility]                                                     Pathologist:           Jared Kelley MD                                                          Specimens:   1) - Small Intestine, Duodenum, small bowel bx                                                      2) - Gastric, Antrum, antrum bx                                                                     3) - Small Intestine, Ileum, ti bx                                                                  4) - Large Intestine, colonic mucosa bx                                                    Final Diagnosis       1.  MUCOSA, DUODENUM:  NO SIGNIFICANT HISTOLOGIC ABNORMALITY.    2.  MUCOSA, ANTRUM OF STOMACH:  CHRONIC GASTRITIS WITH BENIGN LYMPHOID AGGREGATES AND CONGESTION.  NEGATIVE FOR HELICOBACTER PYLORI (HP IMMUNOSTAIN).    3.  MUCOSA, TERMINAL ILEUM:  NO SIGNIFICANT HISTOLOGIC ABNORMALITY.    4.  MUCOSA, COLON:  NO SIGNIFICANT HISTOLOGIC ABNORMALITY.      Comment       Helicobacter pylori (HP) immunostain is performed because an appropriate inflammatory milieu is present and organisms are not seen on H & E stained slides.    HP immunostain was developed and its performance characteristics determined by [facility].  It has not been cleared or approved by the U.S. Food and Drug Administration.  The FDA has determined that such clearance or approval is not necessary.  This test is used for  clinical purposes.  It should not be regarded as investigational or for research.  This laboratory is certified under the Clinical Laboratory Improvement Amendments of 1988 (CLIA-88) as qualified to perform high complexity clinical laboratory testing.            Gross Description       Received for examination are 4 containers, each of which have nodular bits of white soft tissue measuring 0.3-0.5 cc in aggregate.  All specimens are embedded as labeled.  1A duodenum; 2A antrum of stomach; 3A terminal ileum; 4A mucosa of colon.      Embedded Images     POC Glucose Fingerstick    Specimen: Blood   Result Value Ref Range    Glucose 81 70 - 130 mg/dL   Results for orders placed or performed in visit on 11/28/17   Gastrointestinal Panel, PCR - Stool, Per Rectum    Specimen: Per Rectum; Stool   Result Value Ref Range    Campylobacter Not Detected Not Detected    Clostridium difficile (toxin A/B) Not Detected Not Detected, NA formed stool, C diff not applicable on patient less than one year of age    Plesiomonas shigelloides Not Detected Not Detected    Salmonella Not Detected Not Detected    Vibrio Not Detected Not Detected    Vibrio cholerae Not Detected Not Detected    Yersinia enterocolitica Not Detected Not Detected    Enteroaggregative E. coli (EAEC) Not Detected Not Detected    Enteropathogenic E. coli (EPEC) Not Detected Not Detected    Enterotoxigenic E. coli (ETEC) lt/st Not Detected Not Detected    Shiga-like toxin-producing E. coli (STEC) stx1/stx2 Not Detected Not Detected    E. coli O157 Not Detected Not Detected    Shigella/Enteroinvasive E. coli (EIEC) Not Detected Not Detected    Cryptosporidium Not Detected Not Detected    Cyclospora cayetanensis Not Detected Not Detected    Entamoeba histolytica Not Detected Not Detected    Giardia lamblia Not Detected Not Detected    Adenovirus F40/41 Not Detected Not Detected    Astrovirus Not Detected Not Detected    Norovirus GI/GII Not Detected Not Detected     Rotavirus A Not Detected Not Detected    Sapovirus (I, II, IV or V) Not Detected Not Detected   Glia(IgA / G) & TTG(IgA / G)    Specimen: Blood   Result Value Ref Range    Gliadin Deamidated Peptide Ab, IgA 5 0 - 19 units    Deaminated Gliadin Ab IgG 4 0 - 19 units    Tissue Transglutaminase IgA <2 0 - 3 U/mL    Tissue Transglutaminase IgG <2 0 - 5 U/mL   Allergens (12) Foods    Specimen: Blood   Result Value Ref Range    Class Description Comment     Egg White <0.10 Class 0 kU/L    Milk, Cow's <0.10 Class 0 kU/L    CodFish <0.10 Class 0 kU/L    Sesame Seed <0.10 Class 0 kU/L    Peanut <0.10 Class 0 kU/L    Soybean <0.10 Class 0 kU/L    Hazelnut <0.10 Class 0 kU/L    Shrimp <0.10 Class 0 kU/L    Scallop <0.10 Class 0 kU/L    Gluten <0.10 Class 0 kU/L    [place] <0.10 Class 0 kU/L    Wheat <0.10 Class 0 kU/L   Trypsin    Specimen: Blood   Result Value Ref Range    Trypsin 471 169 - 773 ng/mL   Lipase    Specimen: Blood   Result Value Ref Range    Lipase 104 23 - 300 U/L   Amylase    Specimen: Blood   Result Value Ref Range    Amylase 60 25 - 140 U/L   Results for orders placed or performed in visit on 11/07/16    COLONOSCOPY   Result Value Ref Range     Colonoscopy completed; ordered by Dr. Ijeoma Joseph    Results for orders placed or performed in visit on 12/12/11   Converted Surgical Pathology    Specimen: Tissue   Result Value Ref Range    Spec Descr 1 SPECIMEN(S): A SMALL BOWEL BIOPSY     Specimen description 2 SPECIMEN(S): B GASTRIC BIOPSY     Specimen description 3 SPECIMEN(S): C TERMINAL ILEUM BIOPSY     Specimen description 4 SPECIMEN(S): D MUCOSAL COLON BIOPSY     Preoperative Diagnosis   PREOPERATIVE DIAGNOSIS:  Diarrhea       Postoperative Diagnosis         POSTOPERATIVE DIAGNOSIS:  Small bowel (A), gastric mucosa (B), terminal ileum (C), colonic mucosa  (D)      Gross Description         GROSS DESCRIPTION:  In 4 containers, each of these show mucosal biopsies measuring up to 0.3  cm in greatest  "dimension.  Embedded accordingly.  (A) small bowel, (B)  gastric mucosa, (C) terminal ileum, (D) colonic mucosa.      Final Diagnosis         FINAL DIAGNOSIS:  A.  SMALL BOWEL, MUCOSAL BIOPSY:             NO SIGNIFICANT PATHOLOGIC DIAGNOSIS.  B.  GASTRIC MUCOSAL BIOPSY:             MILD CHRONIC INFLAMMATION.             NO EVIDENCE OF HELICOBACTER PYLORI (IP STAIN PERFORMED).  C.  TERMINAL ILEUM, MUCOSAL BIOPSY:             NO SIGNIFICANT PATHOLOGIC DIAGNOSIS.  D.  COLONIC MUCOSAL BIOPSY, RANDOM:             NO SIGNIFICANT PATHOLOGIC DIAGNOSIS.      Comment         COMMENT:  Helicobacter immunoperoxidase stain performed on block B.  HP immunostain was developed and its performance characteristics  determined by [facility].  It has not been  cleared or approved by the U.S. Food and Drug Administration.  The FDA  has determined that such clearance or approval is not necessary.  This  test is used for clinical purposes.  It should not be regarded as  investigational or for research.  This laboratory is certified under the  Clinical Laboratory Improvement Amendments of 1988 (CLIA-88) as  qualified to perform high complexity clinical laboratory testing.      CONVERTED (HISTORICAL) FINAL PATHOLOGIST       Diagnostician:  BESSIE ESTES M.D.  Pathologist  Electronically Signed 12/13/2011      Diagnosis Code   DIAGNOSIS CODE:  4      Results for orders placed or performed in visit on 11/25/03   Converted Surgical Pathology    Specimen: Tissue   Result Value Ref Range    Spec Descr 1 SPECIMEN(S): A GASTRIC BIOPSY     Gross Description         GROSS DESCRIPTION:  The specimen is labeled \"gastric mucosa\" and consists of two tan  fragments measuring 0.8 x 0.2 x 0.3 cm together.  Totally submitted.      Final Diagnosis         FINAL DIAGNOSIS:  GASTRIC MUCOSA, BIOPSY:            CHRONIC GASTRITIS SHOWING MINIMAL ACTIVITY.            A SPECIAL STAIN FOR HELICOBACTER PYLORI IS " NEGATIVE.  pgl    DIAGNOSIS CODE:  6      Comment   CLINICAL DIAGNOSIS:  Gastric mucosa biopsy       CONVERTED (HISTORICAL) FINAL PATHOLOGIST       Diagnostician:  JOY ALVARADO M.D.  Pathologist  Electronically Signed 11/28/2003       *Note: Due to a large number of results and/or encounters for the requested time period, some results have not been displayed. A complete set of results can be found in Results Review.

## 2023-02-02 LAB
CGA SERPL-MCNC: 597.9 NG/ML (ref 0–101.8)
GLIADIN PEPTIDE IGA SER-ACNC: 5 UNITS (ref 0–19)
GLIADIN PEPTIDE IGG SER-ACNC: 8 UNITS (ref 0–19)
TTG IGA SER-ACNC: <2 U/ML (ref 0–3)
TTG IGG SER-ACNC: 5 U/ML (ref 0–5)

## 2023-02-03 LAB — GASTRIN SERPL-MCNC: 159 PG/ML (ref 0–115)

## 2023-02-06 ENCOUNTER — LAB (OUTPATIENT)
Dept: LAB | Facility: OTHER | Age: 63
End: 2023-02-06
Payer: COMMERCIAL

## 2023-02-06 PROCEDURE — 83497 ASSAY OF 5-HIAA: CPT | Performed by: PHYSICIAN ASSISTANT

## 2023-02-06 PROCEDURE — 81050 URINALYSIS VOLUME MEASURE: CPT | Performed by: PHYSICIAN ASSISTANT

## 2023-02-08 LAB
ALPHA-GAL IGE QN: 1.65 KU/L
BEEF IGE QN: 0.11 KU/L
CODFISH IGE QN: <0.1 KU/L
CONV CLASS DESCRIPTION: ABNORMAL
CONV CLASS DESCRIPTION: NORMAL
COW MILK IGE QN: <0.1 KU/L
EGG WHITE IGE QN: <0.1 KU/L
GLUTEN IGE QN: <0.1 KU/L
HAZELNUT IGE QN: <0.1 KU/L
IGE SERPL-ACNC: 167 IU/ML (ref 6–495)
LAMB IGE QN: 0.11 KU/L
PEANUT IGE QN: <0.1 KU/L
PORK IGE QN: <0.1 KU/L
SCALLOP IGE QN: <0.1 KU/L
SESAME SEED IGE QN: <0.1 KU/L
SHRIMP IGE QN: <0.1 KU/L
SOYBEAN IGE QN: <0.1 KU/L
WALNUT IGE QN: <0.1 KU/L
WHEAT IGE QN: <0.1 KU/L

## 2023-02-14 DIAGNOSIS — R97.0 ELEVATED CARCINOEMBRYONIC ANTIGEN (CEA): ICD-10-CM

## 2023-02-14 DIAGNOSIS — R19.7 DIARRHEA, UNSPECIFIED TYPE: ICD-10-CM

## 2023-02-14 DIAGNOSIS — R97.8 ABNORMAL TUMOR MARKERS: ICD-10-CM

## 2023-02-14 DIAGNOSIS — D50.8 OTHER IRON DEFICIENCY ANEMIA: Primary | ICD-10-CM

## 2023-02-14 DIAGNOSIS — K63.89 MASS OF SMALL INTESTINE: ICD-10-CM

## 2023-02-15 ENCOUNTER — TELEPHONE (OUTPATIENT)
Dept: GASTROENTEROLOGY | Facility: CLINIC | Age: 63
End: 2023-02-15
Payer: COMMERCIAL

## 2023-02-15 NOTE — TELEPHONE ENCOUNTER
Patient was notified with Date and Time of CT Scan..... 02/23/23 @ 0900. She voiced understanding and will  contrast from Radiology. She was advised to not eat or drink 4 hours prior to procedure. She voiced understanding.      ----- Message from Abel Laboy PA-C sent at 2/14/2023  7:53 PM CST -----  I recommend CT. Order placed.  ----- Message -----  From: Lab, Background User  Sent: 2/1/2023   8:10 PM CST  To: Abel Laboy PA-C

## 2023-02-17 LAB
5OH-INDOLEACETATE 24H UR-MCNC: 4.7 MG/L
5OH-INDOLEACETATE 24H UR-MRATE: 4 MG/24 HR (ref 0–14.9)

## 2023-02-20 RX ORDER — FLUTICASONE PROPIONATE 50 MCG
SPRAY, SUSPENSION (ML) NASAL
Qty: 16 G | Refills: 11 | Status: SHIPPED | OUTPATIENT
Start: 2023-02-20 | End: 2023-03-03 | Stop reason: SDUPTHER

## 2023-02-22 DIAGNOSIS — R10.84 GENERALIZED ABDOMINAL PAIN: Primary | ICD-10-CM

## 2023-02-23 ENCOUNTER — LAB (OUTPATIENT)
Dept: LAB | Facility: OTHER | Age: 63
End: 2023-02-23
Payer: COMMERCIAL

## 2023-02-23 LAB
ALBUMIN SERPL-MCNC: 4.1 G/DL (ref 3.5–5)
ALBUMIN/GLOB SERPL: 1.5 G/DL (ref 1.1–1.8)
ALP SERPL-CCNC: 71 U/L (ref 38–126)
ALT SERPL W P-5'-P-CCNC: 28 U/L
ANION GAP SERPL CALCULATED.3IONS-SCNC: 9 MMOL/L (ref 5–15)
AST SERPL-CCNC: 26 U/L (ref 14–36)
BILIRUB SERPL-MCNC: 0.4 MG/DL (ref 0.2–1.3)
BUN SERPL-MCNC: 12 MG/DL (ref 7–23)
BUN/CREAT SERPL: 20.3 (ref 7–25)
CALCIUM SPEC-SCNC: 9.5 MG/DL (ref 8.4–10.2)
CHLORIDE SERPL-SCNC: 101 MMOL/L (ref 101–112)
CO2 SERPL-SCNC: 29 MMOL/L (ref 22–30)
CREAT SERPL-MCNC: 0.59 MG/DL (ref 0.52–1.04)
EGFRCR SERPLBLD CKD-EPI 2021: 103.3 ML/MIN/1.73
GLOBULIN UR ELPH-MCNC: 2.7 GM/DL (ref 2.3–3.5)
GLUCOSE SERPL-MCNC: 137 MG/DL (ref 70–99)
POTASSIUM SERPL-SCNC: 3.9 MMOL/L (ref 3.4–5)
PROT SERPL-MCNC: 6.8 G/DL (ref 6.3–8.6)
SODIUM SERPL-SCNC: 139 MMOL/L (ref 137–145)

## 2023-02-23 PROCEDURE — 36415 COLL VENOUS BLD VENIPUNCTURE: CPT | Performed by: PHYSICIAN ASSISTANT

## 2023-02-23 PROCEDURE — 80053 COMPREHEN METABOLIC PANEL: CPT | Performed by: PHYSICIAN ASSISTANT

## 2023-03-02 ENCOUNTER — OFFICE VISIT (OUTPATIENT)
Dept: GASTROENTEROLOGY | Facility: CLINIC | Age: 63
End: 2023-03-02
Payer: COMMERCIAL

## 2023-03-02 VITALS
BODY MASS INDEX: 22.37 KG/M2 | WEIGHT: 139.2 LBS | HEIGHT: 66 IN | DIASTOLIC BLOOD PRESSURE: 62 MMHG | OXYGEN SATURATION: 98 % | HEART RATE: 95 BPM | SYSTOLIC BLOOD PRESSURE: 112 MMHG

## 2023-03-02 DIAGNOSIS — R10.13 EPIGASTRIC PAIN: ICD-10-CM

## 2023-03-02 DIAGNOSIS — R97.0 ELEVATED CARCINOEMBRYONIC ANTIGEN (CEA): ICD-10-CM

## 2023-03-02 DIAGNOSIS — K63.89 MASS OF SMALL INTESTINE: ICD-10-CM

## 2023-03-02 DIAGNOSIS — R10.84 GENERALIZED ABDOMINAL PAIN: Primary | ICD-10-CM

## 2023-03-02 DIAGNOSIS — R93.3 ABNORMAL FINDING ON GI TRACT IMAGING: ICD-10-CM

## 2023-03-02 DIAGNOSIS — R97.8 ABNORMAL TUMOR MARKERS: ICD-10-CM

## 2023-03-02 DIAGNOSIS — K21.00 GASTROESOPHAGEAL REFLUX DISEASE WITH ESOPHAGITIS WITHOUT HEMORRHAGE: ICD-10-CM

## 2023-03-02 PROCEDURE — 99214 OFFICE O/P EST MOD 30 MIN: CPT | Performed by: PHYSICIAN ASSISTANT

## 2023-03-02 RX ORDER — DEXTROSE AND SODIUM CHLORIDE 5; .45 G/100ML; G/100ML
30 INJECTION, SOLUTION INTRAVENOUS CONTINUOUS PRN
Status: CANCELLED | OUTPATIENT
Start: 2023-03-22

## 2023-03-02 RX ORDER — ESOMEPRAZOLE MAGNESIUM 40 MG/1
40 CAPSULE, DELAYED RELEASE ORAL 2 TIMES DAILY
Qty: 60 CAPSULE | Refills: 2 | Status: SHIPPED | OUTPATIENT
Start: 2023-03-02

## 2023-03-02 NOTE — H&P (VIEW-ONLY)
Chief Complaint   Patient presents with   • Follow-up     1 Month   • Anemia   • Heartburn   • IBS-C       ENDO PROCEDURE ORDERED:EGDwith small bowel enteroscopy, abnl jejunum on CT. ? Lesion on M2A    Subjective    Sravanthi Ellison is a 63 y.o. female. she is here today for follow-up.    History of Present Illness    Patient seen on a recheck of her GERD, persistent anemia, IBS, questionable lesion of the ileocecal valve on M2A capsule. Last seen 03/01/2023. She had had an EGD/colonoscopy 12/22/2021, showed esophagitis, gastritis, hemorrhoids. She states she is having 2 out of 10 abdominal pain that is more of a burning sensation. She cannot miss the Prilosec without severe symptoms. She is having difficulty swallowing with a constant globus sensation. When she lies down she feels very tender in her mid abdomen. It often hurts after she eats in her stomach. Weight is down 1-1/2 pounds since last visit.     Patient had laboratory 02/09/2023 showed normal TSH, T3, T4, vitamin D. Urinalysis did show glucose, normal CBC, uric acid was normal, magnesium was low at 1.5, A1c 6.5%, B12 of 1500, fairly normal cholesterol. CMP showed a potassium 3.3, otherwise normal. She had laboratories turned in on 02/17/2023 that showed a normal urinary 5-HIAA, serum gastrin slightly increased at 159. CEA was 2.34, normal AFP, Chromogranin A was 597.9, Alpha-gal was slightly positive at 1.6 with beef 0.11, lamb 0.11. The rest of the recurrent GI distress panel was negative. She underwent further studies on 02/23/2023. CT abdomen and pelvis with contrast showed multiple liver cysts, post cholecystectomy with 9 mm common bile duct, right renal cyst, mild jejunal wall thickening in the mid abdomen, increased stool but normal appearing terminal ileum, degeneration of the spine. CMP at that time showed a glucose 137, otherwise normal. I had tried to give her Carafate at her last visit but she states it made her sicker.     A/P: Patient with  severe esophagitis, gastritis and burning. Will try increasing her to Nexium 40 mg BID. I have recommended small bowel enteroscopy. I did talk to Dr. Staley. Her endoscopies did not show any colonic lesion. M2A capsule showed a possible lesion around the ileocecal valve. On CT she is showing mid jejunal thickening. Will try to get to this area endoscopically and will try to do biopsies. May need to consider endoscopic ultrasound but it is not clear if this would be any more successful. Will plan follow-up after the above, further pending clinical course and the results of the above.       The following portions of the patient's history were reviewed and updated as appropriate:   Past Medical History:   Diagnosis Date   • Diabetes mellitus (HCC)    • Diarrhea    • Diverticular disease of colon    • Gastroesophageal reflux disease    • Hiatal hernia    • Irritable bowel syndrome    • Nausea    • Right upper quadrant pain    • Traumatic brain injury 06/2017     Past Surgical History:   Procedure Laterality Date   • BREAST BIOPSY     • CARPAL TUNNEL INJECTION     • CATARACT EXTRACTION     • CHOLECYSTECTOMY     • COLONOSCOPY  12/12/2011    Mild diverticulosis in sigmoid colon. Stool collected for study.   • COLONOSCOPY N/A 12/6/2017    Procedure: COLONOSCOPY--look TI, bx;  Surgeon: Marshal Staley MD;  Location: [facility] ENDOSCOPY;  Service:    • COLONOSCOPY N/A 12/22/2021    Procedure: COLONOSCOPY;  Surgeon: Marshal Staley MD;  Location: [facility] ENDOSCOPY;  Service: Gastroenterology;  Laterality: N/A;   • ENDOSCOPY N/A 12/6/2017    Procedure: ESOPHAGOGASTRODUODENOSCOPY--small bowel biopsies;  Surgeon: Marshal Staley MD;  Location: [facility] ENDOSCOPY;  Service:    • ENDOSCOPY N/A 12/22/2021    Procedure: ESOPHAGOGASTRODUODENOSCOPY WITH DILATATION--bx;  Surgeon: Marshal Staley MD;  Location: [facility] ENDOSCOPY;  Service: Gastroenterology;  Laterality: N/A;   • ENDOSCOPY W/ PEG TUBE PLACEMENT  12/12/2011    Normal  hypopharynx, esophagus, symmetrical & patent pylorus. Hiatus hernia in GE junction. Non-erosive gastritis in stomach. Multiple biopsies taken. Normal duodenum. Multiple biopsies taken.   • SHOULDER ARTHROSCOPY     • TONSILLECTOMY     • UPPER GASTROINTESTINAL ENDOSCOPY  12/12/2011   • UPPER GASTROINTESTINAL ENDOSCOPY  12/06/2017   • UPPER GASTROINTESTINAL ENDOSCOPY  12/22/2021     Family History   Problem Relation Age of Onset   • Breast cancer Other    • Diabetes Other    • Heart disease Other    • Hypertension Other    • Skin cancer Other      OB History    No obstetric history on file.       Allergies   Allergen Reactions   • Other GI Intolerance     Regulo gal   • Adhesive Tape Dermatitis     Itching, redness, rash     Social History     Socioeconomic History   • Marital status:    Tobacco Use   • Smoking status: Never   • Smokeless tobacco: Never   Vaping Use   • Vaping Use: Never used   Substance and Sexual Activity   • Alcohol use: No   • Drug use: No   • Sexual activity: Defer     Current Medications:  Prior to Admission medications    Medication Sig Start Date End Date Taking? Authorizing Provider   ALLERGY SERUM INJECTION Inject 1 each under the skin into the appropriate area as directed.   Yes Vishal Hsu MD   azelastine (ASTELIN) 0.1 % nasal spray 2 sprays into the nostril(s) as directed by provider 2 (Two) Times a Day. Use in each nostril as directed 3/4/22  Yes Osiel Menon MD   busPIRone (BUSPAR) 7.5 MG tablet Take 1 tablet by mouth 2 (Two) Times a Day. 3/29/18  Yes Vishal Hsu MD   Cyanocobalamin (VITAMIN B-12 ER PO) Take 1,000 mg by mouth Daily.   Yes Vishal Hsu MD   DULoxetine (CYMBALTA) 30 MG capsule take 1 tablet by mouth every morning for anxiety depression 12/20/17  Yes Vishal Hsu MD   empagliflozin (JARDIANCE) 25 MG tablet tablet Take 1 tablet by mouth Daily.   Yes Vishal Hsu MD   ferrous sulfate 325 (65 FE) MG EC tablet TAKE  1 TABLET BY MOUTH DAILY WITH BREAKFAST 5/10/22 5/10/23 Yes Abel Laboy PA-C   fluticasone (FLONASE) 50 MCG/ACT nasal spray SHAKE LIQUID AND USE 2 SPRAYS IN EACH NOSTRIL DAILY 2/20/23  Yes Osiel Menon MD   hydrOXYzine (ATARAX) 10 MG tablet Take 1 tablet by mouth 3 (Three) Times a Day As Needed for Itching.   Yes Vishal Hsu MD   hyoscyamine (LEVSIN) 0.125 MG SL tablet Take 1 tablet by mouth Every 4 (Four) Hours As Needed for Cramping (abd pain). 4/26/22  Yes Abel Laboy PA-C   Linzess 72 MCG capsule capsule TAKE 1 CAPSULE BY MOUTH EVERY MORNING BEFORE BREAKFAST 1/3/23  Yes Abel Laboy PA-C   magnesium oxide (MAG-OX) 400 MG tablet TAKE 1 TABLET(400 MG) BY MOUTH DAILY 6/16/22  Yes Vishal Hsu MD   meloxicam (MOBIC) 15 MG tablet Take 1 tablet by mouth Daily.   Yes Vishal Hsu MD   metFORMIN (GLUCOPHAGE) 1000 MG tablet Take 1 tablet by mouth 2 (Two) Times a Day With Meals.   Yes Vishal Hsu MD   montelukast (SINGULAIR) 10 MG tablet Take 1 tablet by mouth Daily. 3/4/22  Yes Osiel Menon MD   omeprazole (priLOSEC) 40 MG capsule Take 1 capsule by mouth Daily.   Yes Vishal Hsu MD   pioglitazone (ACTOS) 30 MG tablet Take 1 tablet by mouth Daily.   Yes Vishal Hsu MD   pravastatin (PRAVACHOL) 20 MG tablet Take 1 tablet by mouth Daily.   Yes Vishal Hsu MD   Semaglutide, 1 MG/DOSE, (Ozempic, 1 MG/DOSE,) 4 MG/3ML solution pen-injector 2 mg. 11/16/21  Yes Vishal Hsu MD   sucralfate (Carafate) 1 g tablet Take 1 tablet by mouth 2 (Two) Times a Day. 2/1/23  Yes Abel Laboy PA-C   traMADol (ULTRAM) 50 MG tablet Take 1 tablet by mouth Every 4 (Four) Hours As Needed. 6/8/22  Yes Vishal Hsu MD   cyclobenzaprine (FLEXERIL) 5 MG tablet Take 1 tablet by mouth 3 (Three) Times a Day As Needed. for muscle spams 7/25/22   Provider, MD Vishal   HYDROcodone-acetaminophen (NORCO) 7.5-325 MG per tablet  "Take 1 tablet by mouth Every 6 (Six) Hours As Needed. 8/29/22   Vishal Hsu MD   methylPREDNISolone (MEDROL) 4 MG dose pack follow package directions 7/22/22   Vishal Hsu MD   tiZANidine (ZANAFLEX) 4 MG tablet  8/29/22   Vishal Hsu MD     Review of Systems  Review of Systems   Constitutional: Positive for unexpected weight change.   HENT: Positive for trouble swallowing.    Gastrointestinal: Positive for abdominal pain and nausea.          Objective    /62 (BP Location: Left arm, Patient Position: Sitting)   Pulse 95   Ht 167.6 cm (66\")   Wt 63.1 kg (139 lb 3.2 oz)   SpO2 98%   BMI 22.47 kg/m²   Physical Exam  Vitals and nursing note reviewed.   Constitutional:       General: She is not in acute distress.     Appearance: She is well-developed. She is ill-appearing.   HENT:      Head: Normocephalic and atraumatic.   Eyes:      Pupils: Pupils are equal, round, and reactive to light.   Cardiovascular:      Rate and Rhythm: Normal rate and regular rhythm.      Heart sounds: Normal heart sounds.   Pulmonary:      Effort: Pulmonary effort is normal.      Breath sounds: Normal breath sounds.   Abdominal:      General: Bowel sounds are normal. There is no distension or abdominal bruit.      Palpations: Abdomen is soft. Abdomen is not rigid. There is no shifting dullness or mass.      Tenderness: There is abdominal tenderness. There is no guarding or rebound.      Hernia: No hernia is present. There is no hernia in the ventral area.   Musculoskeletal:         General: Normal range of motion.      Cervical back: Normal range of motion.   Skin:     General: Skin is warm and dry.   Neurological:      Mental Status: She is alert and oriented to person, place, and time.   Psychiatric:         Behavior: Behavior normal.         Thought Content: Thought content normal.         Judgment: Judgment normal.       Assessment & Plan      1. Generalized abdominal pain    2. Mass of small " intestine    3. Abnormal tumor markers    4. Epigastric pain    5. Gastroesophageal reflux disease with esophagitis without hemorrhage    6. Elevated carcinoembryonic antigen (CEA)    7. Abnormal finding on GI tract imaging    .   Diagnoses and all orders for this visit:    1. Generalized abdominal pain (Primary)  -     Case Request; Standing  -     dextrose 5 % and sodium chloride 0.45 % infusion  -     Case Request    2. Mass of small intestine  -     Case Request; Standing  -     dextrose 5 % and sodium chloride 0.45 % infusion  -     Case Request    3. Abnormal tumor markers    4. Epigastric pain    5. Gastroesophageal reflux disease with esophagitis without hemorrhage    6. Elevated carcinoembryonic antigen (CEA)    7. Abnormal finding on GI tract imaging  -     Case Request; Standing  -     dextrose 5 % and sodium chloride 0.45 % infusion  -     Case Request    Other orders  -     esomeprazole (nexIUM) 40 MG capsule; Take 1 capsule by mouth 2 (Two) Times a Day.  Dispense: 60 capsule; Refill: 2  -     Follow Anesthesia Guidelines / Protocol; Future  -     Obtain Informed Consent; Future  -     Obtain Informed Consent; Standing  -     POC Glucose Once; Standing        Orders placed during this encounter include:  Orders Placed This Encounter   Procedures   • Obtain Informed Consent     Standing Status:   Future     Order Specific Question:   Informed Consent Given For     Answer:   ESOPHAGOGASTRODUODENOSCOPY       Medications prescribed:  New Medications Ordered This Visit   Medications   • esomeprazole (nexIUM) 40 MG capsule     Sig: Take 1 capsule by mouth 2 (Two) Times a Day.     Dispense:  60 capsule     Refill:  2       Requested Prescriptions     Signed Prescriptions Disp Refills   • esomeprazole (nexIUM) 40 MG capsule 60 capsule 2     Sig: Take 1 capsule by mouth 2 (Two) Times a Day.       Review and/or summary of lab tests, radiology, procedures, medications. Review and summary of old records and  obtaining of history. The risks and benefits of my recommendations, as well as other treatment options were discussed with the patient today. Questions were answered.    Follow-up: Return in about 1 month (around 4/2/2023), or if symptoms worsen or fail to improve.     ESOPHAGOGASTRODUODENOSCOPY WITH SMALL BOWEL ENTEROSCOPY--bx. abnl jejunum (N/A)      This document has been electronically signed by Abel Laboy PA-C on March 16, 2023 17:52 CDT      Results for orders placed or performed in visit on 02/22/23   Comprehensive Metabolic Panel    Specimen: Blood   Result Value Ref Range    Glucose 137 (H) 70 - 99 mg/dL    BUN 12 7 - 23 mg/dL    Creatinine 0.59 0.52 - 1.04 mg/dL    Sodium 139 137 - 145 mmol/L    Potassium 3.9 3.4 - 5.0 mmol/L    Chloride 101 101 - 112 mmol/L    CO2 29.0 22.0 - 30.0 mmol/L    Calcium 9.5 8.4 - 10.2 mg/dL    Total Protein 6.8 6.3 - 8.6 g/dL    Albumin 4.1 3.5 - 5.0 g/dL    ALT (SGPT) 28 <=35 U/L    AST (SGOT) 26 14 - 36 U/L    Alkaline Phosphatase 71 38 - 126 U/L    Total Bilirubin 0.4 0.2 - 1.3 mg/dL    Globulin 2.7 2.3 - 3.5 gm/dL    A/G Ratio 1.5 1.1 - 1.8 g/dL    BUN/Creatinine Ratio 20.3 7.0 - 25.0    Anion Gap 9.0 5.0 - 15.0 mmol/L    eGFR 103.3 >60.0 mL/min/1.73   Results for orders placed or performed in visit on 02/01/23   Alpha-Gal IgE Panel    Specimen: Blood   Result Value Ref Range    Class Description Comment     IgE 167 6 - 495 IU/mL    [id number] Alpha-Gal 1.65 (A) Class III kU/L    Beef 0.11 (A) Class 0/I kU/L    Pork <0.10 Class 0 kU/L    Lamb 0.11 (A) Class 0/I kU/L   Glia(IgA / G) & TTG(IgA / G)    Specimen: Blood   Result Value Ref Range    Gliadin Deamidated Peptide Ab, IgA 5 0 - 19 units    Deaminated Gliadin Ab IgG 8 0 - 19 units    Tissue Transglutaminase IgA <2 0 - 3 U/mL    Tissue Transglutaminase IgG 5 0 - 5 U/mL   Allergens (12) Foods    Specimen: Blood   Result Value Ref Range    Class Description Comment     Egg White <0.10 Class 0 kU/L    Milk, Cow's  <0.10 Class 0 kU/L    CodFish <0.10 Class 0 kU/L    Sesame Seed <0.10 Class 0 kU/L    Peanut <0.10 Class 0 kU/L    Soybean <0.10 Class 0 kU/L    Hazelnut <0.10 Class 0 kU/L    Shrimp <0.10 Class 0 kU/L    Scallop <0.10 Class 0 kU/L    Gluten <0.10 Class 0 kU/L    [place] <0.10 Class 0 kU/L    Wheat <0.10 Class 0 kU/L   Chromogranin A    Specimen: Blood   Result Value Ref Range    CHROMOGRANIN A 597.9 (H) 0.0 - 101.8 ng/mL   AFP Tumor Marker    Specimen: Blood   Result Value Ref Range    ALPHA-FETOPROTEIN 5.86 0 - 8.3 ng/mL   5 HIAA, Urine, Quantitative, 24 Hour - Urine, Clean Catch    Specimen: Urine, Clean Catch; 24 Hour Urine   Result Value Ref Range    5-HIAA, Urine 4.7 Undefined mg/L    5-HIAA, 24H Ur 4.0 0.0 - 14.9 mg/24 hr   Gastrin    Specimen: Blood   Result Value Ref Range    Gastrin 159 (H) 0 - 115 pg/mL   CEA    Specimen: Blood   Result Value Ref Range    CEA 2.34 ng/mL   Results for orders placed or performed during the hospital encounter of 12/22/21   Tissue Pathology Exam    Specimen: A: Small Intestine, Duodenum; Tissue    B: Gastric, Antrum; Tissue    C: Esophagus, Distal; Tissue    D: Large Intestine; Tissue   Result Value Ref Range    Case Report       Surgical Pathology Report                         Case: [id number]                                  Authorizing Provider:  Marshal Staley MD        Collected:           12/22/2021 11:27 AM          Ordering Location:     [facility]   Received:            12/22/2021 01:12 PM                                 [facility]                                                     Pathologist:           Raphael Noe MD                                                           Specimens:   1) - Small Intestine, Duodenum, small bowel bx                                                      2) - Gastric, Antrum, antrum bx                                                                     3) - Esophagus, Distal, distal esophagus bx                                                          4) - Large Intestine, colonic mucosa bx                                                    Final Diagnosis       SEE SCANNED REPORT       POC Glucose Once    Specimen: Blood   Result Value Ref Range    Glucose 79 70 - 130 mg/dL   Results for orders placed or performed in visit on 12/20/21   COVID-19, [facility] IN-HOUSE, NP SWAB IN TRANSPORT MEDIA 8-10 HR TAT - Swab, Nasopharynx    Specimen: Nasopharynx; Swab   Result Value Ref Range    COVID19 Not Detected Not Detected - Ref. Range   Results for orders placed or performed in visit on 05/01/18   Alpha - Gal Panel    Specimen: Blood   Result Value Ref Range    Beef 0.16 <0.35 kU/L    Class Description 0/1     Lamb <0.10 <0.35 kU/L    Class Interpretation 0     Pork <0.10 <0.35 kU/L    Class Interpretation 0     Alpha Gal IgE 1.34 (H) <0.35 kU/L   Results for orders placed or performed during the hospital encounter of 12/06/17   Tissue Pathology Exam - Tissue, Small Intestine, Duodenum    Specimen: A: Small Intestine, Duodenum; Tissue    B: Gastric, Antrum; Tissue    C: Small Intestine, Ileum; Tissue    D: Large Intestine; Tissue   Result Value Ref Range    Case Report       Surgical Pathology Report                         Case: [id number]                                  Authorizing Provider:  Marshal Staley MD         Collected:           12/06/2017 10:50 AM          [facility]             Received:            12/06/2017 12:01 PM                                 [facility]                                                     Pathologist:           Jared Kelley MD                                                          Specimens:   1) - Small Intestine, Duodenum, small bowel bx                                                      2) - Gastric, Antrum, antrum bx                                                                     3) - Small Intestine, Ileum, ti bx                                                                   4) - Large Intestine, colonic mucosa bx                                                    Final Diagnosis       1.  MUCOSA, DUODENUM:  NO SIGNIFICANT HISTOLOGIC ABNORMALITY.    2.  MUCOSA, ANTRUM OF STOMACH:  CHRONIC GASTRITIS WITH BENIGN LYMPHOID AGGREGATES AND CONGESTION.  NEGATIVE FOR HELICOBACTER PYLORI (HP IMMUNOSTAIN).    3.  MUCOSA, TERMINAL ILEUM:  NO SIGNIFICANT HISTOLOGIC ABNORMALITY.    4.  MUCOSA, COLON:  NO SIGNIFICANT HISTOLOGIC ABNORMALITY.      Comment       Helicobacter pylori (HP) immunostain is performed because an appropriate inflammatory milieu is present and organisms are not seen on H & E stained slides.    HP immunostain was developed and [facility].  It has not been cleared or approved by the U.S. Food and Drug Administration.  The FDA has determined that such clearance or approval is not necessary.  This test is used for clinical purposes.  It should not be regarded as investigational or for research.  This laboratory is certified under the Clinical Laboratory Improvement Amendments of 1988 (CLIA-88) as qualified to perform high complexity clinical laboratory testing.            Gross Description       Received for examination are 4 containers, each of which have nodular bits of white soft tissue measuring 0.3-0.5 cc in aggregate.  All specimens are embedded as labeled.  1A duodenum; 2A antrum of stomach; 3A terminal ileum; 4A mucosa of colon.      Embedded Images     POC Glucose Fingerstick    Specimen: Blood   Result Value Ref Range    Glucose 81 70 - 130 mg/dL   Results for orders placed or performed in visit on 11/28/17   Gastrointestinal Panel, PCR - Stool, Per Rectum    Specimen: Per Rectum; Stool   Result Value Ref Range    Campylobacter Not Detected Not Detected    Clostridium difficile (toxin A/B) Not Detected Not Detected, NA formed stool, C diff  not applicable on patient less than one year of age    Plesiomonas shigelloides Not Detected Not Detected    Salmonella Not Detected Not Detected    Vibrio Not Detected Not Detected    Vibrio cholerae Not Detected Not Detected    Yersinia enterocolitica Not Detected Not Detected    Enteroaggregative E. coli (EAEC) Not Detected Not Detected    Enteropathogenic E. coli (EPEC) Not Detected Not Detected    Enterotoxigenic E. coli (ETEC) lt/st Not Detected Not Detected    Shiga-like toxin-producing E. coli (STEC) stx1/stx2 Not Detected Not Detected    E. coli O157 Not Detected Not Detected    Shigella/Enteroinvasive E. coli (EIEC) Not Detected Not Detected    Cryptosporidium Not Detected Not Detected    Cyclospora cayetanensis Not Detected Not Detected    Entamoeba histolytica Not Detected Not Detected    Giardia lamblia Not Detected Not Detected    Adenovirus F40/41 Not Detected Not Detected    Astrovirus Not Detected Not Detected    Norovirus GI/GII Not Detected Not Detected    Rotavirus A Not Detected Not Detected    Sapovirus (I, II, IV or V) Not Detected Not Detected   Glia(IgA / G) & TTG(IgA / G)    Specimen: Blood   Result Value Ref Range    Gliadin Deamidated Peptide Ab, IgA 5 0 - 19 units    Deaminated Gliadin Ab IgG 4 0 - 19 units    Tissue Transglutaminase IgA <2 0 - 3 U/mL    Tissue Transglutaminase IgG <2 0 - 5 U/mL   Allergens (12) Foods    Specimen: Blood   Result Value Ref Range    Class Description Comment     Egg White <0.10 Class 0 kU/L    Milk, Cow's <0.10 Class 0 kU/L    CodFish <0.10 Class 0 kU/L    Sesame Seed <0.10 Class 0 kU/L    Peanut <0.10 Class 0 kU/L    Soybean <0.10 Class 0 kU/L    Hazelnut <0.10 Class 0 kU/L    Shrimp <0.10 Class 0 kU/L    Scallop <0.10 Class 0 kU/L    Gluten <0.10 Class 0 kU/L    [place] <0.10 Class 0 kU/L    Wheat <0.10 Class 0 kU/L     *Note: Due to a large number of results and/or encounters for the requested time period, some results have not been displayed. A complete  set of results can be found in Results Review.

## 2023-03-02 NOTE — PROGRESS NOTES
Chief Complaint   Patient presents with   • Follow-up     1 Month   • Anemia   • Heartburn   • IBS-C       ENDO PROCEDURE ORDERED:EGDwith small bowel enteroscopy, abnl jejunum on CT. ? Lesion on M2A    Subjective    Sravanthi Ellison is a 63 y.o. female. she is here today for follow-up.    History of Present Illness    Patient seen on a recheck of her GERD, persistent anemia, IBS, questionable lesion of the ileocecal valve on M2A capsule. Last seen 03/01/2023. She had had an EGD/colonoscopy 12/22/2021, showed esophagitis, gastritis, hemorrhoids. She states she is having 2 out of 10 abdominal pain that is more of a burning sensation. She cannot miss the Prilosec without severe symptoms. She is having difficulty swallowing with a constant globus sensation. When she lies down she feels very tender in her mid abdomen. It often hurts after she eats in her stomach. Weight is down 1-1/2 pounds since last visit.     Patient had laboratory 02/09/2023 showed normal TSH, T3, T4, vitamin D. Urinalysis did show glucose, normal CBC, uric acid was normal, magnesium was low at 1.5, A1c 6.5%, B12 of 1500, fairly normal cholesterol. CMP showed a potassium 3.3, otherwise normal. She had laboratories turned in on 02/17/2023 that showed a normal urinary 5-HIAA, serum gastrin slightly increased at 159. CEA was 2.34, normal AFP, Chromogranin A was 597.9, Alpha-gal was slightly positive at 1.6 with beef 0.11, lamb 0.11. The rest of the recurrent GI distress panel was negative. She underwent further studies on 02/23/2023. CT abdomen and pelvis with contrast showed multiple liver cysts, post cholecystectomy with 9 mm common bile duct, right renal cyst, mild jejunal wall thickening in the mid abdomen, increased stool but normal appearing terminal ileum, degeneration of the spine. CMP at that time showed a glucose 137, otherwise normal. I had tried to give her Carafate at her last visit but she states it made her sicker.     A/P: Patient with  severe esophagitis, gastritis and burning. Will try increasing her to Nexium 40 mg BID. I have recommended small bowel enteroscopy. I did talk to Dr. Staley. Her endoscopies did not show any colonic lesion. M2A capsule showed a possible lesion around the ileocecal valve. On CT she is showing mid jejunal thickening. Will try to get to this area endoscopically and will try to do biopsies. May need to consider endoscopic ultrasound but it is not clear if this would be any more successful. Will plan follow-up after the above, further pending clinical course and the results of the above.       The following portions of the patient's history were reviewed and updated as appropriate:   Past Medical History:   Diagnosis Date   • Diabetes mellitus (HCC)    • Diarrhea    • Diverticular disease of colon    • Gastroesophageal reflux disease    • Hiatal hernia    • Irritable bowel syndrome    • Nausea    • Right upper quadrant pain    • Traumatic brain injury 06/2017     Past Surgical History:   Procedure Laterality Date   • BREAST BIOPSY     • CARPAL TUNNEL INJECTION     • CATARACT EXTRACTION     • CHOLECYSTECTOMY     • COLONOSCOPY  12/12/2011    Mild diverticulosis in sigmoid colon. Stool collected for study.   • COLONOSCOPY N/A 12/6/2017    Procedure: COLONOSCOPY--look TI, bx;  Surgeon: Marshal Staley MD;  Location: [facility] ENDOSCOPY;  Service:    • COLONOSCOPY N/A 12/22/2021    Procedure: COLONOSCOPY;  Surgeon: Marshal Staley MD;  Location: [facility] ENDOSCOPY;  Service: Gastroenterology;  Laterality: N/A;   • ENDOSCOPY N/A 12/6/2017    Procedure: ESOPHAGOGASTRODUODENOSCOPY--small bowel biopsies;  Surgeon: Marshal Staley MD;  Location: [facility] ENDOSCOPY;  Service:    • ENDOSCOPY N/A 12/22/2021    Procedure: ESOPHAGOGASTRODUODENOSCOPY WITH DILATATION--bx;  Surgeon: Marshal Staley MD;  Location: [facility] ENDOSCOPY;  Service: Gastroenterology;  Laterality: N/A;   • ENDOSCOPY W/ PEG TUBE PLACEMENT  12/12/2011    Normal  hypopharynx, esophagus, symmetrical & patent pylorus. Hiatus hernia in GE junction. Non-erosive gastritis in stomach. Multiple biopsies taken. Normal duodenum. Multiple biopsies taken.   • SHOULDER ARTHROSCOPY     • TONSILLECTOMY     • UPPER GASTROINTESTINAL ENDOSCOPY  12/12/2011   • UPPER GASTROINTESTINAL ENDOSCOPY  12/06/2017   • UPPER GASTROINTESTINAL ENDOSCOPY  12/22/2021     Family History   Problem Relation Age of Onset   • Breast cancer Other    • Diabetes Other    • Heart disease Other    • Hypertension Other    • Skin cancer Other      OB History    No obstetric history on file.       Allergies   Allergen Reactions   • Other GI Intolerance     Regulo gal   • Adhesive Tape Dermatitis     Itching, redness, rash     Social History     Socioeconomic History   • Marital status:    Tobacco Use   • Smoking status: Never   • Smokeless tobacco: Never   Vaping Use   • Vaping Use: Never used   Substance and Sexual Activity   • Alcohol use: No   • Drug use: No   • Sexual activity: Defer     Current Medications:  Prior to Admission medications    Medication Sig Start Date End Date Taking? Authorizing Provider   ALLERGY SERUM INJECTION Inject 1 each under the skin into the appropriate area as directed.   Yes Vishal Hsu MD   azelastine (ASTELIN) 0.1 % nasal spray 2 sprays into the nostril(s) as directed by provider 2 (Two) Times a Day. Use in each nostril as directed 3/4/22  Yes Osiel Menon MD   busPIRone (BUSPAR) 7.5 MG tablet Take 1 tablet by mouth 2 (Two) Times a Day. 3/29/18  Yes Vishal Hsu MD   Cyanocobalamin (VITAMIN B-12 ER PO) Take 1,000 mg by mouth Daily.   Yes Vishal Hsu MD   DULoxetine (CYMBALTA) 30 MG capsule take 1 tablet by mouth every morning for anxiety depression 12/20/17  Yes Vishal Hsu MD   empagliflozin (JARDIANCE) 25 MG tablet tablet Take 1 tablet by mouth Daily.   Yes Vishal Hsu MD   ferrous sulfate 325 (65 FE) MG EC tablet TAKE  1 TABLET BY MOUTH DAILY WITH BREAKFAST 5/10/22 5/10/23 Yes Abel Laboy PA-C   fluticasone (FLONASE) 50 MCG/ACT nasal spray SHAKE LIQUID AND USE 2 SPRAYS IN EACH NOSTRIL DAILY 2/20/23  Yes Osiel Menon MD   hydrOXYzine (ATARAX) 10 MG tablet Take 1 tablet by mouth 3 (Three) Times a Day As Needed for Itching.   Yes Vishal Hsu MD   hyoscyamine (LEVSIN) 0.125 MG SL tablet Take 1 tablet by mouth Every 4 (Four) Hours As Needed for Cramping (abd pain). 4/26/22  Yes Abel Laboy PA-C   Linzess 72 MCG capsule capsule TAKE 1 CAPSULE BY MOUTH EVERY MORNING BEFORE BREAKFAST 1/3/23  Yes Abel Laboy PA-C   magnesium oxide (MAG-OX) 400 MG tablet TAKE 1 TABLET(400 MG) BY MOUTH DAILY 6/16/22  Yes Vishal Hsu MD   meloxicam (MOBIC) 15 MG tablet Take 1 tablet by mouth Daily.   Yes Vishal Hsu MD   metFORMIN (GLUCOPHAGE) 1000 MG tablet Take 1 tablet by mouth 2 (Two) Times a Day With Meals.   Yes Vishal Hsu MD   montelukast (SINGULAIR) 10 MG tablet Take 1 tablet by mouth Daily. 3/4/22  Yes Osiel Menon MD   omeprazole (priLOSEC) 40 MG capsule Take 1 capsule by mouth Daily.   Yes Vishal Hsu MD   pioglitazone (ACTOS) 30 MG tablet Take 1 tablet by mouth Daily.   Yes Vishal Hsu MD   pravastatin (PRAVACHOL) 20 MG tablet Take 1 tablet by mouth Daily.   Yes Vishal Hsu MD   Semaglutide, 1 MG/DOSE, (Ozempic, 1 MG/DOSE,) 4 MG/3ML solution pen-injector 2 mg. 11/16/21  Yes Vishal Hsu MD   sucralfate (Carafate) 1 g tablet Take 1 tablet by mouth 2 (Two) Times a Day. 2/1/23  Yes Abel Laboy PA-C   traMADol (ULTRAM) 50 MG tablet Take 1 tablet by mouth Every 4 (Four) Hours As Needed. 6/8/22  Yes Vishal Hsu MD   cyclobenzaprine (FLEXERIL) 5 MG tablet Take 1 tablet by mouth 3 (Three) Times a Day As Needed. for muscle spams 7/25/22   Provider, MD Vishal   HYDROcodone-acetaminophen (NORCO) 7.5-325 MG per tablet  "Take 1 tablet by mouth Every 6 (Six) Hours As Needed. 8/29/22   Vishal Hsu MD   methylPREDNISolone (MEDROL) 4 MG dose pack follow package directions 7/22/22   Vishal Hsu MD   tiZANidine (ZANAFLEX) 4 MG tablet  8/29/22   Vishal Hsu MD     Review of Systems  Review of Systems   Constitutional: Positive for unexpected weight change.   HENT: Positive for trouble swallowing.    Gastrointestinal: Positive for abdominal pain and nausea.          Objective    /62 (BP Location: Left arm, Patient Position: Sitting)   Pulse 95   Ht 167.6 cm (66\")   Wt 63.1 kg (139 lb 3.2 oz)   SpO2 98%   BMI 22.47 kg/m²   Physical Exam  Vitals and nursing note reviewed.   Constitutional:       General: She is not in acute distress.     Appearance: She is well-developed. She is ill-appearing.   HENT:      Head: Normocephalic and atraumatic.   Eyes:      Pupils: Pupils are equal, round, and reactive to light.   Cardiovascular:      Rate and Rhythm: Normal rate and regular rhythm.      Heart sounds: Normal heart sounds.   Pulmonary:      Effort: Pulmonary effort is normal.      Breath sounds: Normal breath sounds.   Abdominal:      General: Bowel sounds are normal. There is no distension or abdominal bruit.      Palpations: Abdomen is soft. Abdomen is not rigid. There is no shifting dullness or mass.      Tenderness: There is abdominal tenderness. There is no guarding or rebound.      Hernia: No hernia is present. There is no hernia in the ventral area.   Musculoskeletal:         General: Normal range of motion.      Cervical back: Normal range of motion.   Skin:     General: Skin is warm and dry.   Neurological:      Mental Status: She is alert and oriented to person, place, and time.   Psychiatric:         Behavior: Behavior normal.         Thought Content: Thought content normal.         Judgment: Judgment normal.       Assessment & Plan      1. Generalized abdominal pain    2. Mass of small " intestine    3. Abnormal tumor markers    4. Epigastric pain    5. Gastroesophageal reflux disease with esophagitis without hemorrhage    6. Elevated carcinoembryonic antigen (CEA)    7. Abnormal finding on GI tract imaging    .   Diagnoses and all orders for this visit:    1. Generalized abdominal pain (Primary)  -     Case Request; Standing  -     dextrose 5 % and sodium chloride 0.45 % infusion  -     Case Request    2. Mass of small intestine  -     Case Request; Standing  -     dextrose 5 % and sodium chloride 0.45 % infusion  -     Case Request    3. Abnormal tumor markers    4. Epigastric pain    5. Gastroesophageal reflux disease with esophagitis without hemorrhage    6. Elevated carcinoembryonic antigen (CEA)    7. Abnormal finding on GI tract imaging  -     Case Request; Standing  -     dextrose 5 % and sodium chloride 0.45 % infusion  -     Case Request    Other orders  -     esomeprazole (nexIUM) 40 MG capsule; Take 1 capsule by mouth 2 (Two) Times a Day.  Dispense: 60 capsule; Refill: 2  -     Follow Anesthesia Guidelines / Protocol; Future  -     Obtain Informed Consent; Future  -     Obtain Informed Consent; Standing  -     POC Glucose Once; Standing        Orders placed during this encounter include:  Orders Placed This Encounter   Procedures   • Obtain Informed Consent     Standing Status:   Future     Order Specific Question:   Informed Consent Given For     Answer:   ESOPHAGOGASTRODUODENOSCOPY       Medications prescribed:  New Medications Ordered This Visit   Medications   • esomeprazole (nexIUM) 40 MG capsule     Sig: Take 1 capsule by mouth 2 (Two) Times a Day.     Dispense:  60 capsule     Refill:  2       Requested Prescriptions     Signed Prescriptions Disp Refills   • esomeprazole (nexIUM) 40 MG capsule 60 capsule 2     Sig: Take 1 capsule by mouth 2 (Two) Times a Day.       Review and/or summary of lab tests, radiology, procedures, medications. Review and summary of old records and  obtaining of history. The risks and benefits of my recommendations, as well as other treatment options were discussed with the patient today. Questions were answered.    Follow-up: Return in about 1 month (around 4/2/2023), or if symptoms worsen or fail to improve.     ESOPHAGOGASTRODUODENOSCOPY WITH SMALL BOWEL ENTEROSCOPY--bx. abnl jejunum (N/A)      This document has been electronically signed by Abel Laboy PA-C on March 16, 2023 17:52 CDT      Results for orders placed or performed in visit on 02/22/23   Comprehensive Metabolic Panel    Specimen: Blood   Result Value Ref Range    Glucose 137 (H) 70 - 99 mg/dL    BUN 12 7 - 23 mg/dL    Creatinine 0.59 0.52 - 1.04 mg/dL    Sodium 139 137 - 145 mmol/L    Potassium 3.9 3.4 - 5.0 mmol/L    Chloride 101 101 - 112 mmol/L    CO2 29.0 22.0 - 30.0 mmol/L    Calcium 9.5 8.4 - 10.2 mg/dL    Total Protein 6.8 6.3 - 8.6 g/dL    Albumin 4.1 3.5 - 5.0 g/dL    ALT (SGPT) 28 <=35 U/L    AST (SGOT) 26 14 - 36 U/L    Alkaline Phosphatase 71 38 - 126 U/L    Total Bilirubin 0.4 0.2 - 1.3 mg/dL    Globulin 2.7 2.3 - 3.5 gm/dL    A/G Ratio 1.5 1.1 - 1.8 g/dL    BUN/Creatinine Ratio 20.3 7.0 - 25.0    Anion Gap 9.0 5.0 - 15.0 mmol/L    eGFR 103.3 >60.0 mL/min/1.73   Results for orders placed or performed in visit on 02/01/23   Alpha-Gal IgE Panel    Specimen: Blood   Result Value Ref Range    Class Description Comment     IgE 167 6 - 495 IU/mL    [id number] Alpha-Gal 1.65 (A) Class III kU/L    Beef 0.11 (A) Class 0/I kU/L    Pork <0.10 Class 0 kU/L    Lamb 0.11 (A) Class 0/I kU/L   Glia(IgA / G) & TTG(IgA / G)    Specimen: Blood   Result Value Ref Range    Gliadin Deamidated Peptide Ab, IgA 5 0 - 19 units    Deaminated Gliadin Ab IgG 8 0 - 19 units    Tissue Transglutaminase IgA <2 0 - 3 U/mL    Tissue Transglutaminase IgG 5 0 - 5 U/mL   Allergens (12) Foods    Specimen: Blood   Result Value Ref Range    Class Description Comment     Egg White <0.10 Class 0 kU/L    Milk, Cow's  <0.10 Class 0 kU/L    CodFish <0.10 Class 0 kU/L    Sesame Seed <0.10 Class 0 kU/L    Peanut <0.10 Class 0 kU/L    Soybean <0.10 Class 0 kU/L    Hazelnut <0.10 Class 0 kU/L    Shrimp <0.10 Class 0 kU/L    Scallop <0.10 Class 0 kU/L    Gluten <0.10 Class 0 kU/L    [place] <0.10 Class 0 kU/L    Wheat <0.10 Class 0 kU/L   Chromogranin A    Specimen: Blood   Result Value Ref Range    CHROMOGRANIN A 597.9 (H) 0.0 - 101.8 ng/mL   AFP Tumor Marker    Specimen: Blood   Result Value Ref Range    ALPHA-FETOPROTEIN 5.86 0 - 8.3 ng/mL   5 HIAA, Urine, Quantitative, 24 Hour - Urine, Clean Catch    Specimen: Urine, Clean Catch; 24 Hour Urine   Result Value Ref Range    5-HIAA, Urine 4.7 Undefined mg/L    5-HIAA, 24H Ur 4.0 0.0 - 14.9 mg/24 hr   Gastrin    Specimen: Blood   Result Value Ref Range    Gastrin 159 (H) 0 - 115 pg/mL   CEA    Specimen: Blood   Result Value Ref Range    CEA 2.34 ng/mL   Results for orders placed or performed during the hospital encounter of 12/22/21   Tissue Pathology Exam    Specimen: A: Small Intestine, Duodenum; Tissue    B: Gastric, Antrum; Tissue    C: Esophagus, Distal; Tissue    D: Large Intestine; Tissue   Result Value Ref Range    Case Report       Surgical Pathology Report                         Case: [id number]                                  Authorizing Provider:  Marshal Staley MD        Collected:           12/22/2021 11:27 AM          Ordering Location:     [facility]   Received:            12/22/2021 01:12 PM                                 [facility]                                                     Pathologist:           Raphael Noe MD                                                           Specimens:   1) - Small Intestine, Duodenum, small bowel bx                                                      2) - Gastric, Antrum, antrum bx                                                                     3) - Esophagus, Distal, distal esophagus bx                                                          4) - Large Intestine, colonic mucosa bx                                                    Final Diagnosis       SEE SCANNED REPORT       POC Glucose Once    Specimen: Blood   Result Value Ref Range    Glucose 79 70 - 130 mg/dL   Results for orders placed or performed in visit on 12/20/21   COVID-19, [facility] IN-HOUSE, NP SWAB IN TRANSPORT MEDIA 8-10 HR TAT - Swab, Nasopharynx    Specimen: Nasopharynx; Swab   Result Value Ref Range    COVID19 Not Detected Not Detected - Ref. Range   Results for orders placed or performed in visit on 05/01/18   Alpha - Gal Panel    Specimen: Blood   Result Value Ref Range    Beef 0.16 <0.35 kU/L    Class Description 0/1     Lamb <0.10 <0.35 kU/L    Class Interpretation 0     Pork <0.10 <0.35 kU/L    Class Interpretation 0     Alpha Gal IgE 1.34 (H) <0.35 kU/L   Results for orders placed or performed during the hospital encounter of 12/06/17   Tissue Pathology Exam - Tissue, Small Intestine, Duodenum    Specimen: A: Small Intestine, Duodenum; Tissue    B: Gastric, Antrum; Tissue    C: Small Intestine, Ileum; Tissue    D: Large Intestine; Tissue   Result Value Ref Range    Case Report       Surgical Pathology Report                         Case: [id number]                                  Authorizing Provider:  Marshal Staley MD         Collected:           12/06/2017 10:50 AM          Ordering Location:     [facility]             Received:            12/06/2017 12:01 PM                                 [facility]                                                     Pathologist:           Jared Kelley MD                                                          Specimens:   1) - Small Intestine, Duodenum, small bowel bx                                                      2) - Gastric, Antrum, antrum bx                                                                     3) - Small Intestine, Ileum, ti bx                                                                   4) - Large Intestine, colonic mucosa bx                                                    Final Diagnosis       1.  MUCOSA, DUODENUM:  NO SIGNIFICANT HISTOLOGIC ABNORMALITY.    2.  MUCOSA, ANTRUM OF STOMACH:  CHRONIC GASTRITIS WITH BENIGN LYMPHOID AGGREGATES AND CONGESTION.  NEGATIVE FOR HELICOBACTER PYLORI (HP IMMUNOSTAIN).    3.  MUCOSA, TERMINAL ILEUM:  NO SIGNIFICANT HISTOLOGIC ABNORMALITY.    4.  MUCOSA, COLON:  NO SIGNIFICANT HISTOLOGIC ABNORMALITY.      Comment       Helicobacter pylori (HP) immunostain is performed because an appropriate inflammatory milieu is present and organisms are not seen on H & E stained slides.    HP immunostain was developed and [facility].  It has not been cleared or approved by the U.S. Food and Drug Administration.  The FDA has determined that such clearance or approval is not necessary.  This test is used for clinical purposes.  It should not be regarded as investigational or for research.  This laboratory is certified under the Clinical Laboratory Improvement Amendments of 1988 (CLIA-88) as qualified to perform high complexity clinical laboratory testing.            Gross Description       Received for examination are 4 containers, each of which have nodular bits of white soft tissue measuring 0.3-0.5 cc in aggregate.  All specimens are embedded as labeled.  1A duodenum; 2A antrum of stomach; 3A terminal ileum; 4A mucosa of colon.      Embedded Images     POC Glucose Fingerstick    Specimen: Blood   Result Value Ref Range    Glucose 81 70 - 130 mg/dL   Results for orders placed or performed in visit on 11/28/17   Gastrointestinal Panel, PCR - Stool, Per Rectum    Specimen: Per Rectum; Stool   Result Value Ref Range    Campylobacter Not Detected Not Detected    Clostridium difficile (toxin A/B) Not Detected Not Detected, NA formed stool, C diff  not applicable on patient less than one year of age    Plesiomonas shigelloides Not Detected Not Detected    Salmonella Not Detected Not Detected    Vibrio Not Detected Not Detected    Vibrio cholerae Not Detected Not Detected    Yersinia enterocolitica Not Detected Not Detected    Enteroaggregative E. coli (EAEC) Not Detected Not Detected    Enteropathogenic E. coli (EPEC) Not Detected Not Detected    Enterotoxigenic E. coli (ETEC) lt/st Not Detected Not Detected    Shiga-like toxin-producing E. coli (STEC) stx1/stx2 Not Detected Not Detected    E. coli O157 Not Detected Not Detected    Shigella/Enteroinvasive E. coli (EIEC) Not Detected Not Detected    Cryptosporidium Not Detected Not Detected    Cyclospora cayetanensis Not Detected Not Detected    Entamoeba histolytica Not Detected Not Detected    Giardia lamblia Not Detected Not Detected    Adenovirus F40/41 Not Detected Not Detected    Astrovirus Not Detected Not Detected    Norovirus GI/GII Not Detected Not Detected    Rotavirus A Not Detected Not Detected    Sapovirus (I, II, IV or V) Not Detected Not Detected   Glia(IgA / G) & TTG(IgA / G)    Specimen: Blood   Result Value Ref Range    Gliadin Deamidated Peptide Ab, IgA 5 0 - 19 units    Deaminated Gliadin Ab IgG 4 0 - 19 units    Tissue Transglutaminase IgA <2 0 - 3 U/mL    Tissue Transglutaminase IgG <2 0 - 5 U/mL   Allergens (12) Foods    Specimen: Blood   Result Value Ref Range    Class Description Comment     Egg White <0.10 Class 0 kU/L    Milk, Cow's <0.10 Class 0 kU/L    CodFish <0.10 Class 0 kU/L    Sesame Seed <0.10 Class 0 kU/L    Peanut <0.10 Class 0 kU/L    Soybean <0.10 Class 0 kU/L    Hazelnut <0.10 Class 0 kU/L    Shrimp <0.10 Class 0 kU/L    Scallop <0.10 Class 0 kU/L    Gluten <0.10 Class 0 kU/L    [place] <0.10 Class 0 kU/L    Wheat <0.10 Class 0 kU/L     *Note: Due to a large number of results and/or encounters for the requested time period, some results have not been displayed. A complete  set of results can be found in Results Review.

## 2023-03-03 ENCOUNTER — OFFICE VISIT (OUTPATIENT)
Dept: OTOLARYNGOLOGY | Facility: CLINIC | Age: 63
End: 2023-03-03
Payer: COMMERCIAL

## 2023-03-03 ENCOUNTER — CLINICAL SUPPORT (OUTPATIENT)
Dept: AUDIOLOGY | Facility: CLINIC | Age: 63
End: 2023-03-03
Payer: COMMERCIAL

## 2023-03-03 VITALS — OXYGEN SATURATION: 99 % | BODY MASS INDEX: 22.18 KG/M2 | HEIGHT: 66 IN | WEIGHT: 138 LBS

## 2023-03-03 DIAGNOSIS — H93.8X3 POPPING OF BOTH EARS: ICD-10-CM

## 2023-03-03 DIAGNOSIS — H90.3 SENSORINEURAL HEARING LOSS OF BOTH EARS: Primary | ICD-10-CM

## 2023-03-03 DIAGNOSIS — H93.19 TINNITUS, UNSPECIFIED LATERALITY: ICD-10-CM

## 2023-03-03 DIAGNOSIS — H90.3 SENSORINEURAL HEARING LOSS, ASYMMETRICAL: Primary | ICD-10-CM

## 2023-03-03 DIAGNOSIS — H60.63 CHRONIC NON-INFECTIVE OTITIS EXTERNA OF BOTH EARS, UNSPECIFIED TYPE: ICD-10-CM

## 2023-03-03 DIAGNOSIS — J31.0 CHRONIC RHINITIS: ICD-10-CM

## 2023-03-03 PROCEDURE — 92557 COMPREHENSIVE HEARING TEST: CPT | Performed by: AUDIOLOGIST

## 2023-03-03 PROCEDURE — 99214 OFFICE O/P EST MOD 30 MIN: CPT | Performed by: OTOLARYNGOLOGY

## 2023-03-03 PROCEDURE — 92567 TYMPANOMETRY: CPT | Performed by: AUDIOLOGIST

## 2023-03-03 RX ORDER — AZELASTINE 1 MG/ML
2 SPRAY, METERED NASAL 2 TIMES DAILY
Qty: 90 ML | Refills: 3 | Status: SHIPPED | OUTPATIENT
Start: 2023-03-03

## 2023-03-03 RX ORDER — FLUTICASONE PROPIONATE 50 MCG
2 SPRAY, SUSPENSION (ML) NASAL DAILY
Qty: 16 G | Refills: 11 | Status: SHIPPED | OUTPATIENT
Start: 2023-03-03

## 2023-03-03 RX ORDER — MONTELUKAST SODIUM 10 MG/1
10 TABLET ORAL DAILY
Qty: 90 TABLET | Refills: 3 | Status: SHIPPED | OUTPATIENT
Start: 2023-03-03 | End: 2023-03-16

## 2023-03-03 NOTE — PROGRESS NOTES
STANDARD AUDIOMETRIC EVALUATION      Name:  Sravanthi Ellison  :  1960  Age:  62 y.o.  Date of Evaluation:  3/3/2023      HISTORY    Reason for visit:  Sravanthi Ellison is seen today for a yearly hearing test at the request of Dr. Osiel Menon.  Patient reports she hears popping in both ears, and sometimes ringing in her ears.  She states her hearing seems about the same.  She reports a history of a left ruptured ear drum in the past.       EVALUATION    See Audiogram    RESULTS        Otoscopy and Tympanometry 226 Hz :  Right Ear:  Otoscopy:  Clear ear canal          Tympanometry:  Middle ear function within normal limits    Left Ear:   Otoscopy:  Clear ear canal        Tympanometry:  Middle ear function within normal limits    Test technique:  Standard Audiometry     Pure Tone Audiometry:   Patient responded to pure tones at 5-30 dB for 250-8000 Hz in right ear, and at 5-70 dB for 250-8000 Hz in left ear.       Speech Audiometry:        Right Ear:  Speech Reception Threshold (SRT) was obtained at 0 dBHL                 Speech Discrimination scores were 100% in quiet when words were presented at 40 dBHL       Left Ear:  Speech Reception Threshold (SRT) was obtained at 0 dBHL                 Speech Discrimination scores were 92% in quiet when words were presented at 40 dBHL    Reliability:   good    IMPRESSIONS:  1.  Tympanometry results are consistent with Middle ear function within normal limits in both ears.  2.  Pure tone results are consistent with within normal limits to mild sloping, high frequency   hearing loss for right ear, and within normal limits to moderately severe sloping, high frequency sensorineural hearing loss in left ear.       RECOMMENDATIONS:  Patient is seeing the Ear Nose and Throat physician immediately following this examination.  It was a pleasure seeing Sravanthi Ellison in Audiology today.  We would be happy to do further testing or discuss these test as  necessary.          This document has been electronically signed by Jacque Vega MS CCC-A on March 3, 2023 10:54 CST       Jacque Vega MS CCC-A  Licensed Audiologist

## 2023-03-03 NOTE — PROGRESS NOTES
Subjective   Sravanthi Ellison is a 62 y.o. female.       History of Present Illness   Patient is followed with mildly asymmetrical sensorineural hearing loss that has been stable on previous audiograms therefore has not been checked in 2 years.  Also has chronic otitis externa and chronic rhinitis.  Uses Astelin Flonase and Singulair.  Symptoms are reasonably well controlled.  No purulent rhinorrhea.  Says she feels like her hearing is reasonably stable.      The following portions of the patient's history were reviewed and updated as appropriate: allergies, current medications, past family history, past medical history, past social history, past surgical history and problem list.     reports that she has never smoked. She has never used smokeless tobacco. She reports that she does not drink alcohol and does not use drugs.   Patient is not a tobacco user and has not been counseled for use of tobacco products      Review of Systems        Objective   Physical Exam  Both ear canals show uninfected squamous debris that is cleaned under the microscope using instrumentation.  Beyond this tympanic membranes are intact and clear.  Nares: Boggy mucosa no discharge or purulence  Oral cavity: No masses or lesions  Pharynx: No erythema or exudate  Neck no adenopathy or mass  Audiogram is obtained and reviewed and shows a bilateral sensorineural hearing loss starting at 3000 Hz on the left and 6000 Hz on the right.  Tympanograms are type a bilaterally.  Discrimination is 100% on the right 92% on the left.  Compared to her last hearing test 2 years ago thresholds are stable         Assessment and Plan   Diagnoses and all orders for this visit:    1. Sensorineural hearing loss of both ears (Primary)    2. Chronic rhinitis    3. Chronic non-infective otitis externa of both ears, unspecified type    Other orders  -     azelastine (ASTELIN) 0.1 % nasal spray; 2 sprays into the nostril(s) as directed by provider 2 (Two) Times a Day.  Use in each nostril as directed  Dispense: 90 mL; Refill: 3  -     fluticasone (FLONASE) 50 MCG/ACT nasal spray; 2 sprays by Each Nare route Daily. Shake well before using.  Dispense: 16 g; Refill: 11  -     montelukast (SINGULAIR) 10 MG tablet; Take 1 tablet by mouth Daily.  Dispense: 90 tablet; Refill: 3               Plan: Ears cleaned as described above.  New prescriptions written for her medications x1 year.  Return in 1 year.  We will not check a hearing test for 2 years unless she notices a subjective change.

## 2023-03-16 RX ORDER — MONTELUKAST SODIUM 10 MG/1
10 TABLET ORAL DAILY
Qty: 90 TABLET | Refills: 3 | Status: SHIPPED | OUTPATIENT
Start: 2023-03-16

## 2023-03-22 ENCOUNTER — ANESTHESIA EVENT (OUTPATIENT)
Dept: GASTROENTEROLOGY | Facility: HOSPITAL | Age: 63
End: 2023-03-22
Payer: COMMERCIAL

## 2023-03-22 ENCOUNTER — HOSPITAL ENCOUNTER (OUTPATIENT)
Facility: HOSPITAL | Age: 63
Setting detail: HOSPITAL OUTPATIENT SURGERY
Discharge: HOME | End: 2023-03-22
Attending: INTERNAL MEDICINE | Admitting: INTERNAL MEDICINE
Payer: COMMERCIAL

## 2023-03-22 ENCOUNTER — ANESTHESIA (OUTPATIENT)
Dept: GASTROENTEROLOGY | Facility: HOSPITAL | Age: 63
End: 2023-03-22
Payer: COMMERCIAL

## 2023-03-22 VITALS
TEMPERATURE: 96.6 F | BODY MASS INDEX: 22.07 KG/M2 | SYSTOLIC BLOOD PRESSURE: 113 MMHG | OXYGEN SATURATION: 97 % | HEIGHT: 66 IN | RESPIRATION RATE: 18 BRPM | WEIGHT: 137.35 LBS | HEART RATE: 92 BPM | DIASTOLIC BLOOD PRESSURE: 64 MMHG

## 2023-03-22 DIAGNOSIS — K63.89 MASS OF SMALL INTESTINE: ICD-10-CM

## 2023-03-22 DIAGNOSIS — R93.3 ABNORMAL FINDING ON GI TRACT IMAGING: ICD-10-CM

## 2023-03-22 DIAGNOSIS — R10.84 GENERALIZED ABDOMINAL PAIN: ICD-10-CM

## 2023-03-22 LAB — GLUCOSE BLDC GLUCOMTR-MCNC: 86 MG/DL (ref 70–130)

## 2023-03-22 PROCEDURE — 25010000002 PROPOFOL 10 MG/ML EMULSION: Performed by: NURSE ANESTHETIST, CERTIFIED REGISTERED

## 2023-03-22 PROCEDURE — 44361 SMALL BOWEL ENDOSCOPY/BIOPSY: CPT | Performed by: INTERNAL MEDICINE

## 2023-03-22 PROCEDURE — 82962 GLUCOSE BLOOD TEST: CPT

## 2023-03-22 RX ORDER — PROPOFOL 10 MG/ML
VIAL (ML) INTRAVENOUS AS NEEDED
Status: DISCONTINUED | OUTPATIENT
Start: 2023-03-22 | End: 2023-03-22 | Stop reason: SURG

## 2023-03-22 RX ORDER — LIDOCAINE HYDROCHLORIDE 20 MG/ML
INJECTION, SOLUTION INFILTRATION; PERINEURAL AS NEEDED
Status: DISCONTINUED | OUTPATIENT
Start: 2023-03-22 | End: 2023-03-22 | Stop reason: SURG

## 2023-03-22 RX ORDER — DEXTROSE AND SODIUM CHLORIDE 5; .45 G/100ML; G/100ML
30 INJECTION, SOLUTION INTRAVENOUS CONTINUOUS PRN
Status: DISCONTINUED | OUTPATIENT
Start: 2023-03-22 | End: 2023-03-22 | Stop reason: HOSPADM

## 2023-03-22 RX ADMIN — PROPOFOL 30 MG: 10 INJECTION, EMULSION INTRAVENOUS at 14:25

## 2023-03-22 RX ADMIN — PROPOFOL 30 MG: 10 INJECTION, EMULSION INTRAVENOUS at 14:22

## 2023-03-22 RX ADMIN — PROPOFOL 30 MG: 10 INJECTION, EMULSION INTRAVENOUS at 14:19

## 2023-03-22 RX ADMIN — DEXTROSE AND SODIUM CHLORIDE 30 ML/HR: 5; 450 INJECTION, SOLUTION INTRAVENOUS at 13:58

## 2023-03-22 RX ADMIN — LIDOCAINE HYDROCHLORIDE 50 MG: 20 INJECTION, SOLUTION INFILTRATION; PERINEURAL at 14:16

## 2023-03-22 RX ADMIN — PROPOFOL 70 MG: 10 INJECTION, EMULSION INTRAVENOUS at 14:15

## 2023-03-22 NOTE — ANESTHESIA PREPROCEDURE EVALUATION
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, has PTSD, no other residual effects from injury  GI/Hepatic/Renal/Endo    (+)  hiatal hernia, GERD,  diabetes mellitus type 2 well controlled,     Musculoskeletal     Abdominal    Substance History      OB/GYN          Other                Anesthesia Evaluation     no history of anesthetic complications:  NPO Solid Status: > 8 hours  NPO Liquid Status: > 2 hours     Airway   Mallampati: II  TM distance: >3 FB  Neck ROM: full  no difficulty expected  Dental - normal exam     Pulmonary - negative pulmonary ROS and normal exam   Cardiovascular - negative cardio ROS and normal exam        Neuro/Psych    ROS Comment: 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               Anesthesia Plan    ASA 2     general   total IV anesthesia  intravenous induction     Anesthetic plan, risks, benefits, and alternatives have been provided, discussed and informed consent has been obtained with: patient.    Plan discussed with CRNA.

## 2023-03-22 NOTE — ANESTHESIA POSTPROCEDURE EVALUATION
Patient: Sravanthi Ellison    Procedure Summary     Date: 03/22/23 Room / Location: [facility] ENDOSCOPY 1 / [facility] ENDOSCOPY    Anesthesia Start: 1415 Anesthesia Stop: 1430    Procedure: ESOPHAGOGASTRODUODENOSCOPY WITH SMALL BOWEL ENTEROSCOPY--bx. abnl jejunum Diagnosis:       Generalized abdominal pain      Mass of small intestine      Abnormal finding on GI tract imaging      (Generalized abdominal pain [R10.84])      (Mass of small intestine [K63.89])      (Abnormal finding on GI tract imaging [R93.3])    Surgeons: Marshal Staley MD Provider: Migue Hahn CRNA    Anesthesia Type: general ASA Status: 2          Anesthesia Type: general    Vitals  No vitals data found for the desired time range.          Post Anesthesia Care and Evaluation    Patient location during evaluation: PHASE II  Patient participation: complete - patient participated  Level of consciousness: sleepy but conscious  Pain score: 0  Pain management: adequate    Airway patency: patent  Anesthetic complications: No anesthetic complications  PONV Status: none  Cardiovascular status: acceptable  Respiratory status: acceptable  Hydration status: acceptable    Comments: ---------------------------               03/22/23                      1430      ---------------------------   BP:          124/51         Pulse:         91           Resp:          16           Temp:   97.6 °F (36.4 °C)   SpO2:          98%         ---------------------------

## 2023-03-24 LAB — REF LAB TEST METHOD: NORMAL

## 2023-04-03 ENCOUNTER — OFFICE VISIT (OUTPATIENT)
Dept: GASTROENTEROLOGY | Facility: CLINIC | Age: 63
End: 2023-04-03
Payer: COMMERCIAL

## 2023-04-03 ENCOUNTER — TELEPHONE (OUTPATIENT)
Dept: GASTROENTEROLOGY | Facility: CLINIC | Age: 63
End: 2023-04-03
Payer: COMMERCIAL

## 2023-04-03 VITALS
HEIGHT: 66 IN | SYSTOLIC BLOOD PRESSURE: 110 MMHG | HEART RATE: 99 BPM | BODY MASS INDEX: 22.11 KG/M2 | DIASTOLIC BLOOD PRESSURE: 64 MMHG | WEIGHT: 137.6 LBS | OXYGEN SATURATION: 99 %

## 2023-04-03 DIAGNOSIS — R93.3 ABNORMAL FINDING ON GI TRACT IMAGING: ICD-10-CM

## 2023-04-03 DIAGNOSIS — R63.4 WEIGHT LOSS: ICD-10-CM

## 2023-04-03 DIAGNOSIS — R10.13 EPIGASTRIC PAIN: ICD-10-CM

## 2023-04-03 DIAGNOSIS — R97.8 ABNORMAL TUMOR MARKERS: ICD-10-CM

## 2023-04-03 DIAGNOSIS — R10.84 GENERALIZED ABDOMINAL PAIN: Primary | ICD-10-CM

## 2023-04-03 PROCEDURE — 99214 OFFICE O/P EST MOD 30 MIN: CPT | Performed by: PHYSICIAN ASSISTANT

## 2023-04-03 RX ORDER — FAMOTIDINE 40 MG/1
40 TABLET, FILM COATED ORAL 2 TIMES DAILY
Qty: 60 TABLET | Refills: 2 | Status: SHIPPED | OUTPATIENT
Start: 2023-04-03

## 2023-04-03 RX ORDER — LANOLIN ALCOHOL/MO/W.PET/CERES
1000 CREAM (GRAM) TOPICAL DAILY
COMMUNITY

## 2023-04-03 NOTE — TELEPHONE ENCOUNTER
Pt called to get in before her 4.11.2023 appointment. Advised Pt that [facility] did not have any openings before that. Added her to the wait list.

## 2023-04-03 NOTE — PROGRESS NOTES
Chief Complaint   Patient presents with   • Abdominal Pain       ENDO PROCEDURE ORDERED:    Subjective    Sravanthi Ellison is a 63 y.o. female. she is here today for follow-up.    History of Present Illness    Patient seen on a recheck of her abdominal pain, abnormal studies, elevated Chromogranin, elevated gastrin with mild positive Alpha-gal. Last seen 03/02/2023. She states she got so sick this past Friday with nausea, abdominal pain she states she almost sent to the emergency room, her stomach got very distended, she hurt all over her abdomen. She has been having a lot more belching despite taking the Nexium 40 mg BID. She has a constant 4 out of 10 substernal discomfort. She has not seen cardiology. She states she gets hungry but gets sick when she tries to eat. She can usually eat fish without much difficulty but is finding it difficult to find food that she can tolerate.  She is on low dose Linzess for constipation, weight is down 1.5 pounds since last visit. She had had an EGD on 12/22/2021. EGD/colonoscopy showed esophagitis, gastritis, hemorrhoids. She underwent EGD. She had a questionable lesion of the ileocecal valve on M2a capsule. Subsequently had a CT scanning showing possible jejunal thickening and small bowel enteroscopy on 03/22/2023 was normal with negative biopsy from the jejunum.     A/P: Patient is moderately tender epigastrically. She has had elevated markers as noted above, increased abdominal pain. Her studies thus far have been inconclusive but I feel she continues to be very symptomatic, I did recommend checking an AMA, amylase, lipase, sedimentation rate, CRP. She was encouraged to continue to limit her diet and avoid red meat. I have recommended consultation with Dr. Woodard in [place] for evaluation for possible endoscopic ultrasound, TIF. Will try adding Pepcid 40 mg BID along with the Nexium BID. Will plan follow-up in 6 weeks, further pending clinical course and the results of the  above.         The following portions of the patient's history were reviewed and updated as appropriate:   Past Medical History:   Diagnosis Date   • Diabetes mellitus    • Diarrhea    • Diverticular disease of colon    • Gastroesophageal reflux disease    • Hiatal hernia    • Irritable bowel syndrome    • Nausea    • Right upper quadrant pain    • Traumatic brain injury 06/2017     Past Surgical History:   Procedure Laterality Date   • BACK SURGERY      x2   • BREAST BIOPSY Right    • CARPAL TUNNEL INJECTION     • CARPAL TUNNEL RELEASE Bilateral    • CATARACT EXTRACTION Bilateral    • CHOLECYSTECTOMY     • COLONOSCOPY  12/12/2011    Mild diverticulosis in sigmoid colon. Stool collected for study.   • COLONOSCOPY N/A 12/06/2017    Procedure: COLONOSCOPY--look TI, bx;  Surgeon: Marshal Staley MD;  Location: [facility] ENDOSCOPY;  Service:    • COLONOSCOPY N/A 12/22/2021    Procedure: COLONOSCOPY;  Surgeon: Marshal Staley MD;  Location: [facility] ENDOSCOPY;  Service: Gastroenterology;  Laterality: N/A;   • ENDOSCOPY N/A 12/06/2017    Procedure: ESOPHAGOGASTRODUODENOSCOPY--small bowel biopsies;  Surgeon: Marshal Staley MD;  Location: [facility] ENDOSCOPY;  Service:    • ENDOSCOPY N/A 12/22/2021    Procedure: ESOPHAGOGASTRODUODENOSCOPY WITH DILATATION--bx;  Surgeon: Marshal Staley MD;  Location: [facility] ENDOSCOPY;  Service: Gastroenterology;  Laterality: N/A;   • ENDOSCOPY W/ PEG TUBE PLACEMENT  12/12/2011    Normal hypopharynx, esophagus, symmetrical & patent pylorus. Hiatus hernia in GE junction. Non-erosive gastritis in stomach. Multiple biopsies taken. Normal duodenum. Multiple biopsies taken.   • SHOULDER ARTHROSCOPY Right    • TONSILLECTOMY     • UPPER GASTROINTESTINAL ENDOSCOPY  12/12/2011   • UPPER GASTROINTESTINAL ENDOSCOPY  12/06/2017   • UPPER GASTROINTESTINAL ENDOSCOPY  12/22/2021     Family History   Problem Relation Age of Onset   • Breast cancer Other    • Diabetes Other    • Heart disease Other    •  Hypertension Other    • Skin cancer Other      OB History    No obstetric history on file.       Allergies   Allergen Reactions   • Other GI Intolerance     Regulo gal   • Adhesive Tape Dermatitis     Itching, redness, rash     Social History     Socioeconomic History   • Marital status:    Tobacco Use   • Smoking status: Never   • Smokeless tobacco: Never   Vaping Use   • Vaping Use: Never used   Substance and Sexual Activity   • Alcohol use: No   • Drug use: No   • Sexual activity: Defer     Current Medications:  Prior to Admission medications    Medication Sig Start Date End Date Taking? Authorizing Provider   ALLERGY SERUM INJECTION Inject 1 each under the skin into the appropriate area as directed.   Yes Vishal Hsu MD   azelastine (ASTELIN) 0.1 % nasal spray 2 sprays into the nostril(s) as directed by provider 2 (Two) Times a Day. Use in each nostril as directed 3/3/23  Yes Osiel Menon MD   busPIRone (BUSPAR) 7.5 MG tablet Take 1 tablet by mouth 2 (Two) Times a Day. 3/29/18  Yes Vishal Hsu MD   DULoxetine (CYMBALTA) 30 MG capsule Take 2 capsules by mouth Daily. 12/20/17  Yes Vishal Hsu MD   empagliflozin (JARDIANCE) 25 MG tablet tablet Take 1 tablet by mouth Daily.   Yes Vishal Hsu MD   esomeprazole (nexIUM) 40 MG capsule Take 1 capsule by mouth 2 (Two) Times a Day. 3/2/23  Yes Abel Laboy PA-C   ferrous sulfate 325 (65 FE) MG EC tablet TAKE 1 TABLET BY MOUTH DAILY WITH BREAKFAST 5/10/22 5/10/23 Yes Abel Laboy PA-C   fluticasone (FLONASE) 50 MCG/ACT nasal spray 2 sprays by Each Nare route Daily. Shake well before using. 3/3/23  Yes Osiel Menon MD   hydrOXYzine (ATARAX) 10 MG tablet Take 1 tablet by mouth 3 (Three) Times a Day As Needed for Itching.   Yes Vishal Hsu MD   Linzess 72 MCG capsule capsule TAKE 1 CAPSULE BY MOUTH EVERY MORNING BEFORE BREAKFAST 1/3/23  Yes Abel Laboy PA-C   magnesium oxide  (MAG-OX) 400 MG tablet TAKE 1 TABLET(400 MG) BY MOUTH DAILY 6/16/22  Yes Vishal Hsu MD   meloxicam (MOBIC) 15 MG tablet Take 1 tablet by mouth Daily.   Yes Vishal Hsu MD   metFORMIN (GLUCOPHAGE) 1000 MG tablet Take 1 tablet by mouth 2 (Two) Times a Day With Meals.   Yes Vishal Hsu MD   montelukast (SINGULAIR) 10 MG tablet TAKE 1 TABLET BY MOUTH DAILY 3/16/23  Yes Osiel Menon MD   pioglitazone (ACTOS) 30 MG tablet Take 1 tablet by mouth Daily.   Yes Vishal Hsu MD   pravastatin (PRAVACHOL) 20 MG tablet Take 1 tablet by mouth Daily.   Yes Vishal Hsu MD   Semaglutide, 1 MG/DOSE, (Ozempic, 1 MG/DOSE,) 4 MG/3ML solution pen-injector 2 mg. 11/16/21  Yes Vishal Hsu MD   vitamin B-12 (CYANOCOBALAMIN) 1000 MCG tablet Take 1 tablet by mouth Daily.   Yes Vishal Hsu MD   HYDROcodone-acetaminophen (NORCO) 7.5-325 MG per tablet Take 1 tablet by mouth Every 6 (Six) Hours As Needed.  Patient not taking: Reported on 4/3/2023 8/29/22   Vishal Hsu MD   cyclobenzaprine (FLEXERIL) 5 MG tablet Take 1 tablet by mouth 3 (Three) Times a Day As Needed. for muscle spams  Patient not taking: Reported on 4/3/2023 7/25/22 4/3/23  Vishal Hsu MD   hyoscyamine (LEVSIN) 0.125 MG SL tablet Take 1 tablet by mouth Every 4 (Four) Hours As Needed for Cramping (abd pain).  Patient not taking: Reported on 4/3/2023 4/26/22 4/3/23  Abel Laboy PA-C   methylPREDNISolone (MEDROL) 4 MG dose pack follow package directions  Patient not taking: Reported on 4/3/2023 7/22/22 4/3/23  Vishal Hsu MD   sucralfate (Carafate) 1 g tablet Take 1 tablet by mouth 2 (Two) Times a Day.  Patient not taking: Reported on 4/3/2023 2/1/23 4/3/23  Abel Laboy PA-C   tiZANidine (ZANAFLEX) 4 MG tablet  8/29/22 4/3/23  Provider, MD Vishal   traMADol (ULTRAM) 50 MG tablet Take 1 tablet by mouth Every 4 (Four) Hours As Needed.  Patient not taking:  "Reported on 4/3/2023 6/8/22 4/3/23  Provider, MD Vishal     Review of Systems  Review of Systems       Objective    /64 (BP Location: Left arm, Patient Position: Sitting)   Pulse 99   Ht 167.6 cm (66\")   Wt 62.4 kg (137 lb 9.6 oz)   SpO2 99%   BMI 22.21 kg/m²   Physical Exam  Vitals and nursing note reviewed.   Constitutional:       General: She is not in acute distress.     Appearance: She is well-developed. She is ill-appearing.   HENT:      Head: Normocephalic and atraumatic.   Eyes:      Pupils: Pupils are equal, round, and reactive to light.   Cardiovascular:      Rate and Rhythm: Normal rate and regular rhythm.      Heart sounds: Normal heart sounds.   Pulmonary:      Effort: Pulmonary effort is normal.      Breath sounds: Normal breath sounds.   Abdominal:      General: Bowel sounds are normal. There is no distension or abdominal bruit.      Palpations: Abdomen is soft. Abdomen is not rigid. There is no shifting dullness or mass.      Tenderness: There is abdominal tenderness. There is no guarding or rebound.      Hernia: No hernia is present. There is no hernia in the ventral area.   Musculoskeletal:         General: Normal range of motion.      Cervical back: Normal range of motion.   Skin:     General: Skin is warm and dry.   Neurological:      Mental Status: She is alert and oriented to person, place, and time.   Psychiatric:         Behavior: Behavior normal.         Thought Content: Thought content normal.         Judgment: Judgment normal.       Assessment & Plan      1. Generalized abdominal pain    2. Abnormal finding on GI tract imaging    3. Abnormal tumor markers    4. Epigastric pain    5. Weight loss    .   Diagnoses and all orders for this visit:    1. Generalized abdominal pain (Primary)  -     Amylase  -     Lipase  -     Comprehensive Metabolic Panel  -     C-reactive Protein  -     Sedimentation Rate  -     YAKOV by IFA, Reflex 9-biomarkers profile  -     Ambulatory Referral " to Gastroenterology    2. Abnormal finding on GI tract imaging  -     Amylase  -     Lipase  -     Comprehensive Metabolic Panel  -     C-reactive Protein  -     Sedimentation Rate  -     YAKOV by IFA, Reflex 9-biomarkers profile  -     Ambulatory Referral to Gastroenterology    3. Abnormal tumor markers  -     Amylase  -     Lipase  -     Comprehensive Metabolic Panel  -     C-reactive Protein  -     Sedimentation Rate  -     YAKOV by IFA, Reflex 9-biomarkers profile  -     Ambulatory Referral to Gastroenterology    4. Epigastric pain  -     Amylase  -     Lipase  -     Comprehensive Metabolic Panel  -     C-reactive Protein  -     Sedimentation Rate  -     YAKOV by IFA, Reflex 9-biomarkers profile  -     Ambulatory Referral to Gastroenterology    5. Weight loss  -     Amylase  -     Lipase  -     Comprehensive Metabolic Panel  -     C-reactive Protein  -     Sedimentation Rate  -     YAKOV by IFA, Reflex 9-biomarkers profile  -     Ambulatory Referral to Gastroenterology    Other orders  -     famotidine (Pepcid) 40 MG tablet; Take 1 tablet by mouth 2 (Two) Times a Day.  Dispense: 60 tablet; Refill: 2        Orders placed during this encounter include:  Orders Placed This Encounter   Procedures   • Amylase     Order Specific Question:   Release to patient     Answer:   Routine Release   • Lipase     Order Specific Question:   Release to patient     Answer:   Routine Release   • Comprehensive Metabolic Panel     Order Specific Question:   Release to patient     Answer:   Routine Release   • C-reactive Protein     Order Specific Question:   Release to patient     Answer:   Routine Release   • Sedimentation Rate     Order Specific Question:   Release to patient     Answer:   Routine Release   • YAKOV by IFA, Reflex 9-biomarkers profile     Order Specific Question:   Release to patient     Answer:   Routine Release   • Ambulatory Referral to Gastroenterology     Referral Priority:   Routine     Referral Type:   Consultation      Referral Reason:   Specialty Services Required     Referred to Provider:   Vance Marks MD     Requested Specialty:   Gastroenterology     Number of Visits Requested:   1       Medications prescribed:  New Medications Ordered This Visit   Medications   • famotidine (Pepcid) 40 MG tablet     Sig: Take 1 tablet by mouth 2 (Two) Times a Day.     Dispense:  60 tablet     Refill:  2     Discontinued Medications       Reason for Discontinue     traMADol (ULTRAM) 50 MG tablet    *Therapy completed     tiZANidine (ZANAFLEX) 4 MG tablet    *Therapy completed     sucralfate (Carafate) 1 g tablet    *Therapy completed     methylPREDNISolone (MEDROL) 4 MG dose pack    *Therapy completed     hyoscyamine (LEVSIN) 0.125 MG SL tablet    *Therapy completed     cyclobenzaprine (FLEXERIL) 5 MG tablet    *Therapy completed        Requested Prescriptions     Signed Prescriptions Disp Refills   • famotidine (Pepcid) 40 MG tablet 60 tablet 2     Sig: Take 1 tablet by mouth 2 (Two) Times a Day.       Review and/or summary of lab tests, radiology, procedures, medications. Review and summary of old records and obtaining of history. The risks and benefits of my recommendations, as well as other treatment options were discussed with the patient today. Questions were answered.    Follow-up: Return in about 6 weeks (around 5/15/2023), or if symptoms worsen or fail to improve, for lab today.     * Surgery not found *      This document has been electronically signed by Abel Laboy PA-C on April 3, 2023 18:09 CDT      Results for orders placed or performed during the hospital encounter of 03/22/23   TISSUE EXAM, [facility] ([facility])    Specimen: Small Intestine, Jejunum; Tissue   Result Value Ref Range    Reference Lab Report       [facility]  [address] [place] [address]  Phone: [phone] or [phone]  Fax: [phone]  Jared Ledezma M.D., Medical Director    PATIENT NAME                                      LABORATORY NO.  [id number]   JEANNA PEÑA.                              [id number]  [id number]                                 AGE                    SEX   N              CLIENT REF #  [facility]                   63        1960      F     [id number]      [id number]    [place]                               REQUESTING M.D.           ATTENDING MBRENDEN.         COPY TO.  [address] [place] [address]                         ROSA [place] [address]                     DATE COLLECTED            DATE RECEIVED          DATE REPORTED  2023    DIAGNOSIS:  JEJUNUM, BIOPSY:  No significant histologic abnormality    JBS/pah    CLINICAL HISTORY:  Generalized  abdominal pain, mass of small intestine, abnormal finding on GI  tract imaging    SPECIMENS RECEIVED:  JEJUNUM, BIOPSY    MICROSCOPIC DESCRIPTION:  Tissue blocks are prepared and slides are examined microscopically on all  specimens. See diagnosis for details.    Professional interpretation rendered by Raphael Noe M.D. at [facility], [address] [place] [address] , [place] [address].    GROSS DESCRIPTION:  Labeled jejunum are multiple portions of tan soft tissue measuring 0.6 x 0.5 x  0.2 cm in aggregate, submitted entirely in 1 block.  [facility]    REVIEWED, DIAGNOSED AND ELECTRONICALLY  SIGNED BY:    Raphael Noe M.D.  CPT CODES:  31715     POC Glucose Once    Specimen: Blood   Result Value Ref Range    Glucose 86 70 - 130 mg/dL   Results for orders placed or performed in visit on 23   Comprehensive Metabolic Panel    Specimen: Blood   Result Value Ref Range    Glucose 137 (H) 70 - 99 mg/dL    BUN 12 7 - 23 mg/dL    Creatinine 0.59 0.52 - 1.04 mg/dL    Sodium 139 137 - 145 mmol/L    Potassium 3.9 3.4 - 5.0 mmol/L    Chloride 101 101 - 112 mmol/L    CO2 29.0 22.0 - 30.0 mmol/L    Calcium 9.5 8.4 - 10.2 mg/dL    Total Protein 6.8  6.3 - 8.6 g/dL    Albumin 4.1 3.5 - 5.0 g/dL    ALT (SGPT) 28 <=35 U/L    AST (SGOT) 26 14 - 36 U/L    Alkaline Phosphatase 71 38 - 126 U/L    Total Bilirubin 0.4 0.2 - 1.3 mg/dL    Globulin 2.7 2.3 - 3.5 gm/dL    A/G Ratio 1.5 1.1 - 1.8 g/dL    BUN/Creatinine Ratio 20.3 7.0 - 25.0    Anion Gap 9.0 5.0 - 15.0 mmol/L    eGFR 103.3 >60.0 mL/min/1.73   Results for orders placed or performed in visit on 02/01/23   Alpha-Gal IgE Panel    Specimen: Blood   Result Value Ref Range    Class Description Comment     IgE 167 6 - 495 IU/mL    [id number] Alpha-Gal 1.65 (A) Class III kU/L    Beef 0.11 (A) Class 0/I kU/L    Pork <0.10 Class 0 kU/L    Lamb 0.11 (A) Class 0/I kU/L   Glia(IgA / G) & TTG(IgA / G)    Specimen: Blood   Result Value Ref Range    Gliadin Deamidated Peptide Ab, IgA 5 0 - 19 units    Deaminated Gliadin Ab IgG 8 0 - 19 units    Tissue Transglutaminase IgA <2 0 - 3 U/mL    Tissue Transglutaminase IgG 5 0 - 5 U/mL   Allergens (12) Foods    Specimen: Blood   Result Value Ref Range    Class Description Comment     Egg White <0.10 Class 0 kU/L    Milk, Cow's <0.10 Class 0 kU/L    CodFish <0.10 Class 0 kU/L    Sesame Seed <0.10 Class 0 kU/L    Peanut <0.10 Class 0 kU/L    Soybean <0.10 Class 0 kU/L    Hazelnut <0.10 Class 0 kU/L    Shrimp <0.10 Class 0 kU/L    Scallop <0.10 Class 0 kU/L    Gluten <0.10 Class 0 kU/L    Kabetogama <0.10 Class 0 kU/L    Wheat <0.10 Class 0 kU/L   Chromogranin A    Specimen: Blood   Result Value Ref Range    CHROMOGRANIN A 597.9 (H) 0.0 - 101.8 ng/mL   AFP Tumor Marker    Specimen: Blood   Result Value Ref Range    ALPHA-FETOPROTEIN 5.86 0 - 8.3 ng/mL   5 HIAA, Urine, Quantitative, 24 Hour - Urine, Clean Catch    Specimen: Urine, Clean Catch; 24 Hour Urine   Result Value Ref Range    5-HIAA, Urine 4.7 Undefined mg/L    5-HIAA, 24H Ur 4.0 0.0 - 14.9 mg/24 hr   Gastrin    Specimen: Blood   Result Value Ref Range    Gastrin 159 (H) 0 - 115 pg/mL   CEA    Specimen: Blood   Result Value Ref Range     CEA 2.34 ng/mL   Results for orders placed or performed during the hospital encounter of 12/22/21   Tissue Pathology Exam    Specimen: A: Small Intestine, Duodenum; Tissue    B: Gastric, Antrum; Tissue    C: Esophagus, Distal; Tissue    D: Large Intestine; Tissue   Result Value Ref Range    Case Report       Surgical Pathology Report                         Case: [id number]                                  Authorizing Provider:  Marshal Staley MD        Collected:           12/22/2021 11:27 AM          Ordering Location:     [facility]   Received:            12/22/2021 01:12 PM                                 [place] [facility]                                                     Pathologist:           Raphael Noe MD                                                           Specimens:   1) - Small Intestine, Duodenum, small bowel bx                                                      2) - Gastric, Antrum, antrum bx                                                                     3) - Esophagus, Distal, distal esophagus bx                                                         4) - Large Intestine, colonic mucosa bx                                                    Final Diagnosis       SEE SCANNED REPORT       POC Glucose Once    Specimen: Blood   Result Value Ref Range    Glucose 79 70 - 130 mg/dL   Results for orders placed or performed in visit on 12/20/21   COVID-19, [facility] IN-HOUSE, NP SWAB IN TRANSPORT MEDIA 8-10 HR TAT - Swab, Nasopharynx    Specimen: Nasopharynx; Swab   Result Value Ref Range    COVID19 Not Detected Not Detected - Ref. Range   Results for orders placed or performed in visit on 05/01/18   Alpha - Gal Panel    Specimen: Blood   Result Value Ref Range    Beef 0.16 <0.35 kU/L    Class Description 0/1     Lamb <0.10 <0.35 kU/L    Class Interpretation 0     Pork <0.10 <0.35 kU/L    Class Interpretation 0     Alpha Gal IgE 1.34 (H) <0.35 kU/L   Results for orders  placed or performed during the hospital encounter of 12/06/17   Tissue Pathology Exam - Tissue, Small Intestine, Duodenum    Specimen: A: Small Intestine, Duodenum; Tissue    B: Gastric, Antrum; Tissue    C: Small Intestine, Ileum; Tissue    D: Large Intestine; Tissue   Result Value Ref Range    Case Report       Surgical Pathology Report                         Case: [id number]                                  Authorizing Provider:  Marshal Staley MD         Collected:           12/06/2017 10:50 AM          Ordering Location:     [facility]             Received:            12/06/2017 12:01 PM                                 [place] [facility]                                                     Pathologist:           Jared Kelley MD                                                          Specimens:   1) - Small Intestine, Duodenum, small bowel bx                                                      2) - Gastric, Antrum, antrum bx                                                                     3) - Small Intestine, Ileum, ti bx                                                                  4) - Large Intestine, colonic mucosa bx                                                    Final Diagnosis       1.  MUCOSA, DUODENUM:  NO SIGNIFICANT HISTOLOGIC ABNORMALITY.    2.  MUCOSA, ANTRUM OF STOMACH:  CHRONIC GASTRITIS WITH BENIGN LYMPHOID AGGREGATES AND CONGESTION.  NEGATIVE FOR HELICOBACTER PYLORI (HP IMMUNOSTAIN).    3.  MUCOSA, TERMINAL ILEUM:  NO SIGNIFICANT HISTOLOGIC ABNORMALITY.    4.  MUCOSA, COLON:  NO SIGNIFICANT HISTOLOGIC ABNORMALITY.      Comment       Helicobacter pylori (HP) immunostain is performed because an appropriate inflammatory milieu is present and organisms are not seen on H & E stained slides.    HP immunostain was developed and its performance characteristics determined by [facility].  It has not been cleared or approved by the U.S. Food  and Drug Administration.  The FDA has determined that such clearance or approval is not necessary.  This test is used for clinical purposes.  It should not be regarded as investigational or for research.  This laboratory is certified under the Clinical Laboratory Improvement Amendments of 1988 (CLIA-88) as qualified to perform high complexity clinical laboratory testing.            Gross Description       Received for examination are 4 containers, each of which have nodular bits of white soft tissue measuring 0.3-0.5 cc in aggregate.  All specimens are embedded as labeled.  1A duodenum; 2A antrum of stomach; 3A terminal ileum; 4A mucosa of colon.      Embedded Images     POC Glucose Fingerstick    Specimen: Blood   Result Value Ref Range    Glucose 81 70 - 130 mg/dL   Results for orders placed or performed in visit on 11/28/17   Gastrointestinal Panel, PCR - Stool, Per Rectum    Specimen: Per Rectum; Stool   Result Value Ref Range    Campylobacter Not Detected Not Detected    Clostridium difficile (toxin A/B) Not Detected Not Detected, NA formed stool, C diff not applicable on patient less than one year of age    Plesiomonas shigelloides Not Detected Not Detected    Salmonella Not Detected Not Detected    Vibrio Not Detected Not Detected    Vibrio cholerae Not Detected Not Detected    Yersinia enterocolitica Not Detected Not Detected    Enteroaggregative E. coli (EAEC) Not Detected Not Detected    Enteropathogenic E. coli (EPEC) Not Detected Not Detected    Enterotoxigenic E. coli (ETEC) lt/st Not Detected Not Detected    Shiga-like toxin-producing E. coli (STEC) stx1/stx2 Not Detected Not Detected    E. coli O157 Not Detected Not Detected    Shigella/Enteroinvasive E. coli (EIEC) Not Detected Not Detected    Cryptosporidium Not Detected Not Detected    Cyclospora cayetanensis Not Detected Not Detected    Entamoeba histolytica Not Detected Not Detected    Giardia lamblia Not Detected Not Detected    Adenovirus  F40/41 Not Detected Not Detected    Astrovirus Not Detected Not Detected    Norovirus GI/GII Not Detected Not Detected    Rotavirus A Not Detected Not Detected    Sapovirus (I, II, IV or V) Not Detected Not Detected   Glia(IgA / G) & TTG(IgA / G)    Specimen: Blood   Result Value Ref Range    Gliadin Deamidated Peptide Ab, IgA 5 0 - 19 units    Deaminated Gliadin Ab IgG 4 0 - 19 units    Tissue Transglutaminase IgA <2 0 - 3 U/mL    Tissue Transglutaminase IgG <2 0 - 5 U/mL   Allergens (12) Foods    Specimen: Blood   Result Value Ref Range    Class Description Comment     Egg White <0.10 Class 0 kU/L    Milk, Cow's <0.10 Class 0 kU/L    CodFish <0.10 Class 0 kU/L    Sesame Seed <0.10 Class 0 kU/L    Peanut <0.10 Class 0 kU/L    Soybean <0.10 Class 0 kU/L    Hazelnut <0.10 Class 0 kU/L    Shrimp <0.10 Class 0 kU/L    Scallop <0.10 Class 0 kU/L    Gluten <0.10 Class 0 kU/L    [place]ston <0.10 Class 0 kU/L    Wheat <0.10 Class 0 kU/L     *Note: Due to a large number of results and/or encounters for the requested time period, some results have not been displayed. A complete set of results can be found in Results Review.

## 2023-04-04 ENCOUNTER — LAB (OUTPATIENT)
Dept: LAB | Facility: OTHER | Age: 63
End: 2023-04-04
Payer: COMMERCIAL

## 2023-04-04 LAB
ALBUMIN SERPL-MCNC: 4.3 G/DL (ref 3.5–5)
ALBUMIN/GLOB SERPL: 1.4 G/DL (ref 1.1–1.8)
ALP SERPL-CCNC: 95 U/L (ref 38–126)
ALT SERPL W P-5'-P-CCNC: 24 U/L
AMYLASE SERPL-CCNC: 81 U/L (ref 30–110)
ANION GAP SERPL CALCULATED.3IONS-SCNC: 10 MMOL/L (ref 5–15)
AST SERPL-CCNC: 24 U/L (ref 14–36)
BILIRUB SERPL-MCNC: 0.3 MG/DL (ref 0.2–1.3)
BUN SERPL-MCNC: 16 MG/DL (ref 7–23)
BUN/CREAT SERPL: 26.7 (ref 7–25)
CALCIUM SPEC-SCNC: 9.8 MG/DL (ref 8.4–10.2)
CHLORIDE SERPL-SCNC: 103 MMOL/L (ref 101–112)
CO2 SERPL-SCNC: 27 MMOL/L (ref 22–30)
CREAT SERPL-MCNC: 0.6 MG/DL (ref 0.52–1.04)
EGFRCR SERPLBLD CKD-EPI 2021: 102.3 ML/MIN/1.73
ERYTHROCYTE [SEDIMENTATION RATE] IN BLOOD: 7 MM/HR (ref 0–20)
GLOBULIN UR ELPH-MCNC: 3 GM/DL (ref 2.3–3.5)
GLUCOSE SERPL-MCNC: 119 MG/DL (ref 70–99)
POTASSIUM SERPL-SCNC: 4.1 MMOL/L (ref 3.4–5)
PROT SERPL-MCNC: 7.3 G/DL (ref 6.3–8.6)
SODIUM SERPL-SCNC: 140 MMOL/L (ref 137–145)

## 2023-04-04 PROCEDURE — 85651 RBC SED RATE NONAUTOMATED: CPT | Performed by: PHYSICIAN ASSISTANT

## 2023-04-04 PROCEDURE — 82150 ASSAY OF AMYLASE: CPT | Performed by: PHYSICIAN ASSISTANT

## 2023-04-04 PROCEDURE — 83690 ASSAY OF LIPASE: CPT | Performed by: PHYSICIAN ASSISTANT

## 2023-04-04 PROCEDURE — 86140 C-REACTIVE PROTEIN: CPT | Performed by: PHYSICIAN ASSISTANT

## 2023-04-04 PROCEDURE — 86038 ANTINUCLEAR ANTIBODIES: CPT | Performed by: PHYSICIAN ASSISTANT

## 2023-04-04 PROCEDURE — 80053 COMPREHEN METABOLIC PANEL: CPT | Performed by: PHYSICIAN ASSISTANT

## 2023-04-04 PROCEDURE — 36415 COLL VENOUS BLD VENIPUNCTURE: CPT | Performed by: PHYSICIAN ASSISTANT

## 2023-04-05 LAB
CRP SERPL-MCNC: 0.31 MG/DL (ref 0–0.5)
LIPASE SERPL-CCNC: 41 U/L (ref 13–60)

## 2023-04-07 LAB
ANA SER QL IF: NEGATIVE
LABORATORY COMMENT REPORT: NORMAL

## 2023-04-14 RX ORDER — LINACLOTIDE 72 UG/1
CAPSULE, GELATIN COATED ORAL
Qty: 30 CAPSULE | Refills: 1 | Status: SHIPPED | OUTPATIENT
Start: 2023-04-14

## 2023-05-23 ENCOUNTER — OFFICE VISIT (OUTPATIENT)
Dept: GASTROENTEROLOGY | Facility: CLINIC | Age: 63
End: 2023-05-23
Payer: COMMERCIAL

## 2023-05-23 VITALS
BODY MASS INDEX: 22.18 KG/M2 | HEIGHT: 66 IN | WEIGHT: 138 LBS | DIASTOLIC BLOOD PRESSURE: 64 MMHG | HEART RATE: 109 BPM | SYSTOLIC BLOOD PRESSURE: 81 MMHG

## 2023-05-23 DIAGNOSIS — R10.84 GENERALIZED ABDOMINAL PAIN: Primary | ICD-10-CM

## 2023-05-23 DIAGNOSIS — R93.3 ABNORMAL FINDING ON GI TRACT IMAGING: ICD-10-CM

## 2023-05-23 DIAGNOSIS — R97.8 ABNORMAL TUMOR MARKERS: ICD-10-CM

## 2023-05-23 DIAGNOSIS — R10.13 EPIGASTRIC PAIN: ICD-10-CM

## 2023-05-23 PROCEDURE — 99214 OFFICE O/P EST MOD 30 MIN: CPT | Performed by: PHYSICIAN ASSISTANT

## 2023-05-23 NOTE — PROGRESS NOTES
Chief Complaint   Patient presents with    Abdominal Pain       ENDO PROCEDURE ORDERED:    Subjective    Sravanthi Ellison is a 63 y.o. female. she is here today for follow-up.    History of Present Illness    Patient see on a recheck of her elevated gastrin, chromogranin, alpha-gal, abdominal pain, GERD, constipation. Last seen 04/03/2023. Repeat laboratories on 04/04/223 showed negative YAKOV, sedimentation rate, CRP. CMP showed a glucose 119, normal amylase, lipase. She was given Pepcid. She is on the Nexium but sill having a lot of burning. She is on the Cymbalta and Linzess 72 mcg for the constipation. Weight is up slightly. Prior EGD 12/22/2021 she had a questionable abnormal jejunum on small bowel enteroscopy 03/22/2023. She did see Maria Luz Arreola on 04/10/2023 who recommended a gastric emptying study which was done on 05/05/2023 showed a half life of 127 minutes with 94% emptied at 4 hours. He recommended a 24-hour PH manometry and this was done on 05/16/2023 but I cannot see the results. She does have an appointment for endoscopic ultrasound on 06/05/2023.     A/P: Patient with significant GERD, abdominal pain, previous abnormal markers as noted above, does show prolonged gastric emptying. She was encouraged to keep her appointments to complete her evaluation with Mr. Carreno. Will plan follow-up in 6 weeks. Would consider repeating her laboratory markers that were elevated. She is moderately tender epigastrically today. Will plan further pending clinical course and the results of the above.           The following portions of the patient's history were reviewed and updated as appropriate:   Past Medical History:   Diagnosis Date    Diabetes mellitus     Diarrhea     Diverticular disease of colon     Gastroesophageal reflux disease     Hiatal hernia     Irritable bowel syndrome     Nausea     Right upper quadrant pain     Traumatic brain injury 06/2017     Past Surgical History:   Procedure Laterality Date    BACK  SURGERY      x2    BREAST BIOPSY Right     CARPAL TUNNEL INJECTION      CARPAL TUNNEL RELEASE Bilateral     CATARACT EXTRACTION Bilateral     CHOLECYSTECTOMY      COLONOSCOPY  12/12/2011    Mild diverticulosis in sigmoid colon. Stool collected for study.    COLONOSCOPY N/A 12/06/2017    Procedure: COLONOSCOPY--look TI, bx;  Surgeon: Marshal Staley MD;  Location: [facility] ENDOSCOPY;  Service:     COLONOSCOPY N/A 12/22/2021    Procedure: COLONOSCOPY;  Surgeon: Marshal Staley MD;  Location: [facility] ENDOSCOPY;  Service: Gastroenterology;  Laterality: N/A;    ENDOSCOPY N/A 12/06/2017    Procedure: ESOPHAGOGASTRODUODENOSCOPY--small bowel biopsies;  Surgeon: Marshal Staley MD;  Location: [facility] ENDOSCOPY;  Service:     ENDOSCOPY N/A 12/22/2021    Procedure: ESOPHAGOGASTRODUODENOSCOPY WITH DILATATION--bx;  Surgeon: Marshal Staley MD;  Location: [facility] ENDOSCOPY;  Service: Gastroenterology;  Laterality: N/A;    ENDOSCOPY W/ PEG TUBE PLACEMENT  12/12/2011    Normal hypopharynx, esophagus, symmetrical & patent pylorus. Hiatus hernia in GE junction. Non-erosive gastritis in stomach. Multiple biopsies taken. Normal duodenum. Multiple biopsies taken.    ENTEROSCOPY SMALL BOWEL N/A 3/22/2023    Procedure: ESOPHAGOGASTRODUODENOSCOPY WITH SMALL BOWEL ENTEROSCOPY--bx. abnl jejunum;  Surgeon: Marshal Staley MD;  Location: [facility] ENDOSCOPY;  Service: Gastroenterology;  Laterality: N/A;    SHOULDER ARTHROSCOPY Right     TONSILLECTOMY      UPPER GASTROINTESTINAL ENDOSCOPY  12/12/2011    UPPER GASTROINTESTINAL ENDOSCOPY  12/06/2017    UPPER GASTROINTESTINAL ENDOSCOPY  12/22/2021     Family History   Problem Relation Age of Onset    Breast cancer Other     Diabetes Other     Heart disease Other     Hypertension Other     Skin cancer Other      OB History    No obstetric history on file.       Allergies   Allergen Reactions    Other GI Intolerance     Regulo gal    Adhesive Tape Dermatitis     Itching, redness, rash     Social History      Socioeconomic History    Marital status:    Tobacco Use    Smoking status: Never    Smokeless tobacco: Never   Vaping Use    Vaping Use: Never used   Substance and Sexual Activity    Alcohol use: No    Drug use: No    Sexual activity: Defer     Current Medications:  Prior to Admission medications    Medication Sig Start Date End Date Taking? Authorizing Provider   ALLERGY SERUM INJECTION Inject 1 each under the skin into the appropriate area as directed.   Yes Vishal Hsu MD   azelastine (ASTELIN) 0.1 % nasal spray 2 sprays into the nostril(s) as directed by provider 2 (Two) Times a Day. Use in each nostril as directed 3/3/23  Yes Osiel Menon MD   busPIRone (BUSPAR) 7.5 MG tablet Take 1 tablet by mouth 2 (Two) Times a Day. 3/29/18  Yes Vishal Hsu MD   DULoxetine (CYMBALTA) 30 MG capsule Take 2 capsules by mouth Daily. 12/20/17  Yes Vishal Hsu MD   empagliflozin (JARDIANCE) 25 MG tablet tablet Take 1 tablet by mouth Daily.   Yes Vishal Hsu MD   esomeprazole (nexIUM) 40 MG capsule Take 1 capsule by mouth 2 (Two) Times a Day. 3/2/23  Yes Abel Laboy PA-C   famotidine (Pepcid) 40 MG tablet Take 1 tablet by mouth 2 (Two) Times a Day. 4/3/23  Yes Abel Laboy PA-C   fluticasone (FLONASE) 50 MCG/ACT nasal spray 2 sprays by Each Nare route Daily. Shake well before using. 3/3/23  Yes Osiel Menon MD   hydrOXYzine (ATARAX) 10 MG tablet Take 1 tablet by mouth 3 (Three) Times a Day As Needed for Itching.   Yes Vishal Hsu MD   Linzess 72 MCG capsule capsule TAKE 1 CAPSULE BY MOUTH EVERY MORNING BEFORE BREAKFAST 4/14/23  Yes Abel Laboy PA-C   magnesium oxide (MAG-OX) 400 MG tablet TAKE 1 TABLET(400 MG) BY MOUTH DAILY 6/16/22  Yes Vishal Hsu MD   meloxicam (MOBIC) 15 MG tablet Take 1 tablet by mouth Daily.   Yes Vishal Hsu MD   metFORMIN (GLUCOPHAGE) 1000 MG tablet Take 1 tablet by mouth 2 (Two) Times a  "Day With Meals.   Yes Vishal Hsu MD   montelukast (SINGULAIR) 10 MG tablet TAKE 1 TABLET BY MOUTH DAILY 3/16/23  Yes Osiel Menon MD   pioglitazone (ACTOS) 30 MG tablet Take 1 tablet by mouth Daily.   Yes Vishal Hsu MD   pravastatin (PRAVACHOL) 20 MG tablet Take 1 tablet by mouth Daily.   Yes Vishal Hsu MD   vitamin B-12 (CYANOCOBALAMIN) 1000 MCG tablet Take 1 tablet by mouth Daily.   Yes Vishal Hsu MD   HYDROcodone-acetaminophen (NORCO) 7.5-325 MG per tablet Take 1 tablet by mouth Every 6 (Six) Hours As Needed.  Patient not taking: Reported on 4/3/2023 8/29/22   Vishal Hsu MD   Semaglutide, 1 MG/DOSE, (Ozempic, 1 MG/DOSE,) 4 MG/3ML solution pen-injector 2 mg.  Patient not taking: Reported on 5/23/2023 11/16/21   Vishal Hsu MD     Review of Systems  Review of Systems       Objective    BP (!) 81/64 (BP Location: Left arm)   Pulse 109   Ht 167.6 cm (66\")   Wt 62.6 kg (138 lb)   BMI 22.27 kg/m²   Physical Exam  Vitals and nursing note reviewed.   Constitutional:       General: She is not in acute distress.     Appearance: She is well-developed. She is ill-appearing.   HENT:      Head: Normocephalic and atraumatic.   Eyes:      Pupils: Pupils are equal, round, and reactive to light.   Cardiovascular:      Rate and Rhythm: Normal rate and regular rhythm.      Heart sounds: Normal heart sounds.   Pulmonary:      Effort: Pulmonary effort is normal.      Breath sounds: Normal breath sounds.   Abdominal:      General: Bowel sounds are normal. There is no distension or abdominal bruit.      Palpations: Abdomen is soft. Abdomen is not rigid. There is no shifting dullness or mass.      Tenderness: There is abdominal tenderness. There is no guarding or rebound.      Hernia: No hernia is present. There is no hernia in the ventral area.   Musculoskeletal:         General: Normal range of motion.      Cervical back: Normal range of motion.   Skin:    "  General: Skin is warm and dry.   Neurological:      Mental Status: She is alert and oriented to person, place, and time.   Psychiatric:         Behavior: Behavior normal.         Thought Content: Thought content normal.         Judgment: Judgment normal.     Assessment & Plan      1. Generalized abdominal pain    2. Abnormal finding on GI tract imaging    3. Abnormal tumor markers    4. Epigastric pain    .   Diagnoses and all orders for this visit:    1. Generalized abdominal pain (Primary)    2. Abnormal finding on GI tract imaging    3. Abnormal tumor markers    4. Epigastric pain        Orders placed during this encounter include:  No orders of the defined types were placed in this encounter.      Medications prescribed:  No orders of the defined types were placed in this encounter.      Requested Prescriptions      No prescriptions requested or ordered in this encounter       Review and/or summary of lab tests, radiology, procedures, medications. Review and summary of old records and obtaining of history. The risks and benefits of my recommendations, as well as other treatment options were discussed with the patient today. Questions were answered.    Follow-up: Return in about 6 weeks (around 7/4/2023), or if symptoms worsen or fail to improve.     * Surgery not found *      This document has been electronically signed by Abel Laboy PA-C on June 5, 2023 18:15 CDT      Results for orders placed or performed in visit on 04/03/23   YAKOV by IFA, Reflex 9-biomarkers profile    Specimen: Blood   Result Value Ref Range    YAKOV Negative     Please note Comment    Sedimentation Rate    Specimen: Blood   Result Value Ref Range    Sed Rate 7 0 - 20 mm/hr   C-reactive Protein    Specimen: Blood   Result Value Ref Range    C-Reactive Protein 0.31 0.00 - 0.50 mg/dL   Lipase    Specimen: Blood   Result Value Ref Range    Lipase 41 13 - 60 U/L   Amylase    Specimen: Blood   Result Value Ref Range    Amylase 81 30 - 110  U/L   Comprehensive Metabolic Panel    Specimen: Blood   Result Value Ref Range    Glucose 119 (H) 70 - 99 mg/dL    BUN 16 7 - 23 mg/dL    Creatinine 0.60 0.52 - 1.04 mg/dL    Sodium 140 137 - 145 mmol/L    Potassium 4.1 3.4 - 5.0 mmol/L    Chloride 103 101 - 112 mmol/L    CO2 27.0 22.0 - 30.0 mmol/L    Calcium 9.8 8.4 - 10.2 mg/dL    Total Protein 7.3 6.3 - 8.6 g/dL    Albumin 4.3 3.5 - 5.0 g/dL    ALT (SGPT) 24 <=35 U/L    AST (SGOT) 24 14 - 36 U/L    Alkaline Phosphatase 95 38 - 126 U/L    Total Bilirubin 0.3 0.2 - 1.3 mg/dL    Globulin 3.0 2.3 - 3.5 gm/dL    A/G Ratio 1.4 1.1 - 1.8 g/dL    BUN/Creatinine Ratio 26.7 (H) 7.0 - 25.0    Anion Gap 10.0 5.0 - 15.0 mmol/L    eGFR 102.3 >60.0 mL/min/1.73   Results for orders placed or performed during the hospital encounter of 23   TISSUE EXAM, [facility] ([facility])    Specimen: Small Intestine, Jejunum; Tissue   Result Value Ref Range    Reference Lab Report       [facility]  [address] [place] [address]  Phone: [phone] or [phone]  Fax: [phone]  Jared Ledezma M.D., Medical Director    PATIENT NAME                                     LABORATORY NO.  [id number]   JEANNA PEÑA.                              [id number]  [id number]                                 AGE                    SEX   SSN              CLIENT REF #  [facility]                   63        1960      F     [id number]      [id number]    [place]                               REQUESTING M.D.           ATTENDING M.D.         COPY TO[address]                         [place] [address]                     DATE COLLECTED            DATE RECEIVED          DATE REPORTED  2023    DIAGNOSIS:  JEJUNUM, BIOPSY:  No significant histologic abnormality    JBS/pah    CLINICAL HISTORY:  Generalized  abdominal pain, mass  of small intestine, abnormal finding on GI  tract imaging    SPECIMENS RECEIVED:  JEJUNUM, BIOPSY    MICROSCOPIC DESCRIPTION:  Tissue blocks are prepared and slides are examined microscopically on all  specimens. See diagnosis for details.    Professional interpretation rendered by Raphael Noe M.D. at [facility], [address] [place] [address].    GROSS DESCRIPTION:  Labeled jejunum are multiple portions of tan soft tissue measuring 0.6 x 0.5 x  0.2 cm in aggregate, submitted entirely in 1 block.  MTH    REVIEWED, DIAGNOSED AND ELECTRONICALLY  SIGNED BY:    Raphael Noe M.D.  CPT CODES:  02095     POC Glucose Once    Specimen: Blood   Result Value Ref Range    Glucose 86 70 - 130 mg/dL   Results for orders placed or performed in visit on 02/22/23   Comprehensive Metabolic Panel    Specimen: Blood   Result Value Ref Range    Glucose 137 (H) 70 - 99 mg/dL    BUN 12 7 - 23 mg/dL    Creatinine 0.59 0.52 - 1.04 mg/dL    Sodium 139 137 - 145 mmol/L    Potassium 3.9 3.4 - 5.0 mmol/L    Chloride 101 101 - 112 mmol/L    CO2 29.0 22.0 - 30.0 mmol/L    Calcium 9.5 8.4 - 10.2 mg/dL    Total Protein 6.8 6.3 - 8.6 g/dL    Albumin 4.1 3.5 - 5.0 g/dL    ALT (SGPT) 28 <=35 U/L    AST (SGOT) 26 14 - 36 U/L    Alkaline Phosphatase 71 38 - 126 U/L    Total Bilirubin 0.4 0.2 - 1.3 mg/dL    Globulin 2.7 2.3 - 3.5 gm/dL    A/G Ratio 1.5 1.1 - 1.8 g/dL    BUN/Creatinine Ratio 20.3 7.0 - 25.0    Anion Gap 9.0 5.0 - 15.0 mmol/L    eGFR 103.3 >60.0 mL/min/1.73   Results for orders placed or performed in visit on 02/01/23   Alpha-Gal IgE Panel    Specimen: Blood   Result Value Ref Range    Class Description Comment     IgE 167 6 - 495 IU/mL    [id number] Alpha-Gal 1.65 (A) Class III kU/L    Beef 0.11 (A) Class 0/I kU/L    Pork <0.10 Class 0 kU/L    Lamb 0.11 (A) Class 0/I kU/L   Glia(IgA / G) & TTG(IgA / G)    Specimen: Blood   Result Value Ref Range    Gliadin Deamidated Peptide Ab, IgA 5 0 - 19 units    Deaminated  Gliadin Ab IgG 8 0 - 19 units    Tissue Transglutaminase IgA <2 0 - 3 U/mL    Tissue Transglutaminase IgG 5 0 - 5 U/mL   Allergens (12) Foods    Specimen: Blood   Result Value Ref Range    Class Description Comment     Egg White <0.10 Class 0 kU/L    Milk, Cow's <0.10 Class 0 kU/L    CodFish <0.10 Class 0 kU/L    Sesame Seed <0.10 Class 0 kU/L    Peanut <0.10 Class 0 kU/L    Soybean <0.10 Class 0 kU/L    Hazelnut <0.10 Class 0 kU/L    Shrimp <0.10 Class 0 kU/L    Scallop <0.10 Class 0 kU/L    Gluten <0.10 Class 0 kU/L    [place] <0.10 Class 0 kU/L    Wheat <0.10 Class 0 kU/L   Chromogranin A    Specimen: Blood   Result Value Ref Range    CHROMOGRANIN A 597.9 (H) 0.0 - 101.8 ng/mL   AFP Tumor Marker    Specimen: Blood   Result Value Ref Range    ALPHA-FETOPROTEIN 5.86 0 - 8.3 ng/mL   5 HIAA, Urine, Quantitative, 24 Hour - Urine, Clean Catch    Specimen: Urine, Clean Catch; 24 Hour Urine   Result Value Ref Range    5-HIAA, Urine 4.7 Undefined mg/L    5-HIAA, 24H Ur 4.0 0.0 - 14.9 mg/24 hr   Gastrin    Specimen: Blood   Result Value Ref Range    Gastrin 159 (H) 0 - 115 pg/mL   CEA    Specimen: Blood   Result Value Ref Range    CEA 2.34 ng/mL   Results for orders placed or performed during the hospital encounter of 12/22/21   Tissue Pathology Exam    Specimen: A: Small Intestine, Duodenum; Tissue    B: Gastric, Antrum; Tissue    C: Esophagus, Distal; Tissue    D: Large Intestine; Tissue   Result Value Ref Range    Case Report       Surgical Pathology Report                         Case: [id number]                                  Authorizing Provider:  Marshal Staley MD        Collected:           12/22/2021 11:27 AM          Ordering Location:     [facility]   Received:            12/22/2021 01:12 PM                                 [facility]                                                     Pathologist:           Raphael Noe MD                                                            Specimens:   1) - Small Intestine, Duodenum, small bowel bx                                                      2) - Gastric, Antrum, antrum bx                                                                     3) - Esophagus, Distal, distal esophagus bx                                                         4) - Large Intestine, colonic mucosa bx                                                    Final Diagnosis       SEE SCANNED REPORT       POC Glucose Once    Specimen: Blood   Result Value Ref Range    Glucose 79 70 - 130 mg/dL   Results for orders placed or performed in visit on 12/20/21   COVID-19, [facility] IN-HOUSE, NP SWAB IN TRANSPORT MEDIA 8-10 HR TAT - Swab, Nasopharynx    Specimen: Nasopharynx; Swab   Result Value Ref Range    COVID19 Not Detected Not Detected - Ref. Range   Results for orders placed or performed in visit on 05/01/18   Alpha - Gal Panel    Specimen: Blood   Result Value Ref Range    Beef 0.16 <0.35 kU/L    Class Description 0/1     Lamb <0.10 <0.35 kU/L    Class Interpretation 0     Pork <0.10 <0.35 kU/L    Class Interpretation 0     Alpha Gal IgE 1.34 (H) <0.35 kU/L   Results for orders placed or performed during the hospital encounter of 12/06/17   Tissue Pathology Exam - Tissue, Small Intestine, Duodenum    Specimen: A: Small Intestine, Duodenum; Tissue    B: Gastric, Antrum; Tissue    C: Small Intestine, Ileum; Tissue    D: Large Intestine; Tissue   Result Value Ref Range    Case Report       Surgical Pathology Report                         Case: [id number]                                  Authorizing Provider:  Marshal Staley MD         Collected:           12/06/2017 10:50 AM          Ordering Location:     [facility]             Received:            12/06/2017 12:01 PM                                 [facility]                                                     Pathologist:           Jared Kelley MD                                                           Specimens:   1) - Small Intestine, Duodenum, small bowel bx                                                      2) - Gastric, Antrum, antrum bx                                                                     3) - Small Intestine, Ileum, ti bx                                                                  4) - Large Intestine, colonic mucosa bx                                                    Final Diagnosis       1.  MUCOSA, DUODENUM:  NO SIGNIFICANT HISTOLOGIC ABNORMALITY.    2.  MUCOSA, ANTRUM OF STOMACH:  CHRONIC GASTRITIS WITH BENIGN LYMPHOID AGGREGATES AND CONGESTION.  NEGATIVE FOR HELICOBACTER PYLORI (HP IMMUNOSTAIN).    3.  MUCOSA, TERMINAL ILEUM:  NO SIGNIFICANT HISTOLOGIC ABNORMALITY.    4.  MUCOSA, COLON:  NO SIGNIFICANT HISTOLOGIC ABNORMALITY.      Comment       Helicobacter pylori (HP) immunostain is performed because an appropriate inflammatory milieu is present and organisms are not seen on H & E stained slides.    HP immunostain was developed and [facility].  It has not been cleared or approved by the U.S. Food and Drug Administration.  The FDA has determined that such clearance or approval is not necessary.  This test is used for clinical purposes.  It should not be regarded as investigational or for research.  This laboratory is certified under the Clinical Laboratory Improvement Amendments of 1988 (CLIA-88) as qualified to perform high complexity clinical laboratory testing.            Gross Description       Received for examination are 4 containers, each of which have nodular bits of white soft tissue measuring 0.3-0.5 cc in aggregate.  All specimens are embedded as labeled.  1A duodenum; 2A antrum of stomach; 3A terminal ileum; 4A mucosa of colon.      Embedded Images     POC Glucose Fingerstick    Specimen: Blood   Result Value Ref Range    Glucose 81 70 - 130 mg/dL   Results for orders placed or  performed in visit on 11/28/17   Gastrointestinal Panel, PCR - Stool, Per Rectum    Specimen: Per Rectum; Stool   Result Value Ref Range    Campylobacter Not Detected Not Detected    Clostridium difficile (toxin A/B) Not Detected Not Detected, NA formed stool, C diff not applicable on patient less than one year of age    Plesiomonas shigelloides Not Detected Not Detected    Salmonella Not Detected Not Detected    Vibrio Not Detected Not Detected    Vibrio cholerae Not Detected Not Detected    Yersinia enterocolitica Not Detected Not Detected    Enteroaggregative E. coli (EAEC) Not Detected Not Detected    Enteropathogenic E. coli (EPEC) Not Detected Not Detected    Enterotoxigenic E. coli (ETEC) lt/st Not Detected Not Detected    Shiga-like toxin-producing E. coli (STEC) stx1/stx2 Not Detected Not Detected    E. coli O157 Not Detected Not Detected    Shigella/Enteroinvasive E. coli (EIEC) Not Detected Not Detected    Cryptosporidium Not Detected Not Detected    Cyclospora cayetanensis Not Detected Not Detected    Entamoeba histolytica Not Detected Not Detected    Giardia lamblia Not Detected Not Detected    Adenovirus F40/41 Not Detected Not Detected    Astrovirus Not Detected Not Detected    Norovirus GI/GII Not Detected Not Detected    Rotavirus A Not Detected Not Detected    Sapovirus (I, II, IV or V) Not Detected Not Detected     *Note: Due to a large number of results and/or encounters for the requested time period, some results have not been displayed. A complete set of results can be found in Results Review.

## 2023-07-18 ENCOUNTER — OFFICE VISIT (OUTPATIENT)
Dept: GASTROENTEROLOGY | Facility: CLINIC | Age: 63
End: 2023-07-18
Payer: COMMERCIAL

## 2023-07-18 VITALS
BODY MASS INDEX: 22.02 KG/M2 | HEART RATE: 91 BPM | DIASTOLIC BLOOD PRESSURE: 70 MMHG | WEIGHT: 137 LBS | HEIGHT: 66 IN | SYSTOLIC BLOOD PRESSURE: 110 MMHG

## 2023-07-18 DIAGNOSIS — R10.13 EPIGASTRIC PAIN: Primary | ICD-10-CM

## 2023-07-18 DIAGNOSIS — K59.04 CHRONIC IDIOPATHIC CONSTIPATION: ICD-10-CM

## 2023-07-18 DIAGNOSIS — K21.00 GASTROESOPHAGEAL REFLUX DISEASE WITH ESOPHAGITIS WITHOUT HEMORRHAGE: ICD-10-CM

## 2023-07-18 DIAGNOSIS — K83.8 BILIARY SLUDGE DETERMINED BY ULTRASOUND: ICD-10-CM

## 2023-07-18 PROCEDURE — 99214 OFFICE O/P EST MOD 30 MIN: CPT | Performed by: PHYSICIAN ASSISTANT

## 2023-07-18 RX ORDER — HYDROXYZINE PAMOATE 25 MG/1
25 CAPSULE ORAL DAILY
COMMUNITY
Start: 2023-07-11

## 2023-07-18 RX ORDER — URSODIOL 300 MG/1
300 CAPSULE ORAL 2 TIMES DAILY WITH MEALS
Qty: 60 CAPSULE | Refills: 2 | Status: SHIPPED | OUTPATIENT
Start: 2023-07-18

## 2023-07-18 RX ORDER — VENLAFAXINE HYDROCHLORIDE 75 MG/1
75 CAPSULE, EXTENDED RELEASE ORAL DAILY
COMMUNITY
Start: 2023-07-07

## 2023-08-29 ENCOUNTER — OFFICE VISIT (OUTPATIENT)
Dept: GASTROENTEROLOGY | Facility: CLINIC | Age: 63
End: 2023-08-29
Payer: COMMERCIAL

## 2023-08-29 VITALS
BODY MASS INDEX: 21.63 KG/M2 | OXYGEN SATURATION: 98 % | HEART RATE: 84 BPM | DIASTOLIC BLOOD PRESSURE: 85 MMHG | SYSTOLIC BLOOD PRESSURE: 120 MMHG | WEIGHT: 134 LBS

## 2023-08-29 DIAGNOSIS — K21.00 GASTROESOPHAGEAL REFLUX DISEASE WITH ESOPHAGITIS WITHOUT HEMORRHAGE: ICD-10-CM

## 2023-08-29 DIAGNOSIS — K59.04 CHRONIC IDIOPATHIC CONSTIPATION: ICD-10-CM

## 2023-08-29 DIAGNOSIS — R10.13 EPIGASTRIC PAIN: Primary | ICD-10-CM

## 2023-08-29 DIAGNOSIS — K83.8 BILIARY SLUDGE DETERMINED BY ULTRASOUND: ICD-10-CM

## 2023-08-29 PROCEDURE — 99213 OFFICE O/P EST LOW 20 MIN: CPT | Performed by: PHYSICIAN ASSISTANT

## 2023-08-29 RX ORDER — DICYCLOMINE HYDROCHLORIDE 10 MG/1
10 CAPSULE ORAL
Qty: 120 CAPSULE | Refills: 2 | Status: SHIPPED | OUTPATIENT
Start: 2023-08-29

## 2023-08-29 NOTE — PROGRESS NOTES
Chief Complaint   Patient presents with    Follow-up       ENDO PROCEDURE ORDERED:    Subjective    Sravanthi Ellison is a 63 y.o. female. she is here today for follow-up.    History of Present Illness    Patient is seen on a recheck of her alpha-gal, gastritis, GERD, constipation. Last seen on 07/18/2023. Patient was started on ursodiol for possible common bile duct sludge and a small stone. She did see Dr. Woodard yesterday and he discussed doing a TIF. She had an endoscopic ultrasound previously. She has not been taking the Pepcid. She has been having some regurgitation when she bends over. She has not been using low-dose Linzess. She is taking ursodiol 300 mg b.i.d. She states she has had a little bit more diarrhea or may go several days without a bowel movement. Weight is down 3 pounds since last visit. Last endoscopy on 12/22/2021, she had small bowel enteroscopy on 03/22/2023.     Laboratories on 08/10/2023 showed normal TSH, T3, T4, vitamin D, CBC, uric acid, cholesterol. Urinalysis showed glucose in blood. Magnesium was low at 1.4. A1c was 6.4. B12 was 1500. CMP showed glucose 109, protein 5.9, otherwise normal.     ASSESSMENT/PLAN:  Patient with alpha-gal, abdominal discomfort, variable bowel habits. I suggested she continue on the ursodiol for now. Suggested a trial on Bentyl 10 mg up to q.i.d. to see if this helps with her discomfort. She states she has an appointment to see an allergist next month and they may be able to help her with food sensitivities, etc. We will plan follow-up in a few months. She declined further intervention at present. Further pending clinical course and the results of the above.         The following portions of the patient's history were reviewed and updated as appropriate:   Past Medical History:   Diagnosis Date    Diabetes mellitus     Diarrhea     Diverticular disease of colon     Gastroesophageal reflux disease     Hiatal hernia     Irritable bowel syndrome     Nausea      Right upper quadrant pain     Traumatic brain injury 06/2017     Past Surgical History:   Procedure Laterality Date    BACK SURGERY      x2    BREAST BIOPSY Right     CARPAL TUNNEL INJECTION      CARPAL TUNNEL RELEASE Bilateral     CATARACT EXTRACTION Bilateral     CHOLECYSTECTOMY      COLONOSCOPY  12/12/2011    Mild diverticulosis in sigmoid colon. Stool collected for study.    COLONOSCOPY N/A 12/06/2017    Procedure: COLONOSCOPY--look TI, bx;  Surgeon: Marshal Staley MD;  Location: [facility] ENDOSCOPY;  Service:     COLONOSCOPY N/A 12/22/2021    Procedure: COLONOSCOPY;  Surgeon: Marshal Staley MD;  Location: [facility] ENDOSCOPY;  Service: Gastroenterology;  Laterality: N/A;    ENDOSCOPY N/A 12/06/2017    Procedure: ESOPHAGOGASTRODUODENOSCOPY--small bowel biopsies;  Surgeon: Marshal Staley MD;  Location: [facility] ENDOSCOPY;  Service:     ENDOSCOPY N/A 12/22/2021    Procedure: ESOPHAGOGASTRODUODENOSCOPY WITH DILATATION--bx;  Surgeon: Marshal Staley MD;  Location: [facility] ENDOSCOPY;  Service: Gastroenterology;  Laterality: N/A;    ENDOSCOPY W/ PEG TUBE PLACEMENT  12/12/2011    Normal hypopharynx, esophagus, symmetrical & patent pylorus. Hiatus hernia in GE junction. Non-erosive gastritis in stomach. Multiple biopsies taken. Normal duodenum. Multiple biopsies taken.    ENTEROSCOPY SMALL BOWEL N/A 3/22/2023    Procedure: ESOPHAGOGASTRODUODENOSCOPY WITH SMALL BOWEL ENTEROSCOPY--bx. abnl jejunum;  Surgeon: Marshal Staley MD;  Location: [facility] ENDOSCOPY;  Service: Gastroenterology;  Laterality: N/A;    SHOULDER ARTHROSCOPY Right     TONSILLECTOMY      UPPER GASTROINTESTINAL ENDOSCOPY  12/12/2011    UPPER GASTROINTESTINAL ENDOSCOPY  12/06/2017    UPPER GASTROINTESTINAL ENDOSCOPY  12/22/2021     Family History   Problem Relation Age of Onset    Breast cancer Other     Diabetes Other     Heart disease Other     Hypertension Other     Skin cancer Other      OB History    No obstetric history on file.       Allergies   Allergen  Reactions    Other GI Intolerance     Regulo gal    Adhesive Tape Dermatitis     Itching, redness, rash     Social History     Socioeconomic History    Marital status:    Tobacco Use    Smoking status: Never    Smokeless tobacco: Never   Vaping Use    Vaping Use: Never used   Substance and Sexual Activity    Alcohol use: No    Drug use: No    Sexual activity: Defer     Current Medications:  Prior to Admission medications    Medication Sig Start Date End Date Taking? Authorizing Provider   ALLERGY SERUM INJECTION Inject 1 each under the skin into the appropriate area as directed.   Yes Vishal Hsu MD   azelastine (ASTELIN) 0.1 % nasal spray 2 sprays into the nostril(s) as directed by provider 2 (Two) Times a Day. Use in each nostril as directed 3/3/23  Yes Osiel Menon MD   empagliflozin (JARDIANCE) 25 MG tablet tablet Take 1 tablet by mouth Daily.   Yes Vishal Hsu MD   fluticasone (FLONASE) 50 MCG/ACT nasal spray 2 sprays by Each Nare route Daily. Shake well before using. 3/3/23  Yes Osiel Menon MD   Linzess 72 MCG capsule capsule TAKE 1 CAPSULE BY MOUTH EVERY MORNING BEFORE BREAKFAST 4/14/23  Yes Abel Laboy PA-C   magnesium oxide (MAG-OX) 400 MG tablet TAKE 1 TABLET(400 MG) BY MOUTH DAILY 6/16/22  Yes ProviderVishal MD   meloxicam (MOBIC) 15 MG tablet Take 1 tablet by mouth Daily.   Yes Vishal Hsu MD   metFORMIN (GLUCOPHAGE) 1000 MG tablet Take 1 tablet by mouth 2 (Two) Times a Day With Meals.   Yes Vishal Hsu MD   montelukast (SINGULAIR) 10 MG tablet TAKE 1 TABLET BY MOUTH DAILY 3/16/23  Yes Osiel Menon MD   pioglitazone (ACTOS) 30 MG tablet Take 1 tablet by mouth Daily.   Yes ProviderVishal MD   pravastatin (PRAVACHOL) 20 MG tablet Take 1 tablet by mouth Daily.   Yes Vishal Hsu MD   Semaglutide, 1 MG/DOSE, (Ozempic, 1 MG/DOSE,) 4 MG/3ML solution pen-injector 2 mg 1 (One) Time Per Week. 11/16/21   Yes Vishal Hsu MD   ursodiol (ACTIGALL) 300 MG capsule Take 1 capsule by mouth 2 (Two) Times a Day With Meals. 7/18/23  Yes Abel Laboy PA-C   venlafaxine XR (EFFEXOR-XR) 75 MG 24 hr capsule Take 1 capsule by mouth Daily. 7/7/23  Yes Vishal Hsu MD   vitamin B-12 (CYANOCOBALAMIN) 1000 MCG tablet Take 1 tablet by mouth Daily.   Yes Vishal Hsu MD   busPIRone (BUSPAR) 7.5 MG tablet Take 1 tablet by mouth 2 (Two) Times a Day.  Patient not taking: Reported on 7/18/2023 3/29/18 8/29/23  Vishal Hsu MD   DULoxetine (CYMBALTA) 30 MG capsule Take 2 capsules by mouth Daily.  Patient not taking: Reported on 7/18/2023 12/20/17 8/29/23  Vishal Hsu MD   famotidine (Pepcid) 40 MG tablet Take 1 tablet by mouth 2 (Two) Times a Day.  Patient not taking: Reported on 7/18/2023 6/21/23 8/29/23  Abel Laboy PA-C   hydrOXYzine pamoate (VISTARIL) 25 MG capsule Take 1 capsule by mouth Daily.  Patient not taking: Reported on 8/29/2023 7/11/23 8/29/23  Vishal Hsu MD     Review of Systems  Review of Systems       Objective    /85   Pulse 84   Wt 60.8 kg (134 lb)   SpO2 98%   BMI 21.63 kg/m²   Physical Exam  Vitals and nursing note reviewed.   Constitutional:       General: She is not in acute distress.     Appearance: She is well-developed.   HENT:      Head: Normocephalic and atraumatic.   Eyes:      Pupils: Pupils are equal, round, and reactive to light.   Cardiovascular:      Rate and Rhythm: Normal rate and regular rhythm.      Heart sounds: Normal heart sounds.   Pulmonary:      Effort: Pulmonary effort is normal.      Breath sounds: Normal breath sounds.   Abdominal:      General: Bowel sounds are normal. There is no distension or abdominal bruit.      Palpations: Abdomen is soft. Abdomen is not rigid. There is no shifting dullness or mass.      Tenderness: There is abdominal tenderness. There is no guarding or rebound.      Hernia: No hernia is  present. There is no hernia in the ventral area.   Musculoskeletal:         General: Normal range of motion.      Cervical back: Normal range of motion.   Skin:     General: Skin is warm and dry.   Neurological:      Mental Status: She is alert and oriented to person, place, and time.   Psychiatric:         Behavior: Behavior normal.         Thought Content: Thought content normal.         Judgment: Judgment normal.     Assessment & Plan      1. Epigastric pain    2. Biliary sludge determined by ultrasound    3. Chronic idiopathic constipation    4. Gastroesophageal reflux disease with esophagitis without hemorrhage    .   Diagnoses and all orders for this visit:    1. Epigastric pain (Primary)    2. Biliary sludge determined by ultrasound    3. Chronic idiopathic constipation    4. Gastroesophageal reflux disease with esophagitis without hemorrhage    Other orders  -     dicyclomine (BENTYL) 10 MG capsule; Take 1 capsule by mouth 4 (Four) Times a Day Before Meals & at Bedtime.  Dispense: 120 capsule; Refill: 2        Orders placed during this encounter include:  No orders of the defined types were placed in this encounter.      Medications prescribed:  New Medications Ordered This Visit   Medications    dicyclomine (BENTYL) 10 MG capsule     Sig: Take 1 capsule by mouth 4 (Four) Times a Day Before Meals & at Bedtime.     Dispense:  120 capsule     Refill:  2     Discontinued Medications         Reason for Discontinue     famotidine (Pepcid) 40 MG tablet    Non-compliance     hydrOXYzine pamoate (VISTARIL) 25 MG capsule    *Therapy completed     busPIRone (BUSPAR) 7.5 MG tablet    *Therapy completed     DULoxetine (CYMBALTA) 30 MG capsule    *Therapy completed          Requested Prescriptions     Signed Prescriptions Disp Refills    dicyclomine (BENTYL) 10 MG capsule 120 capsule 2     Sig: Take 1 capsule by mouth 4 (Four) Times a Day Before Meals & at Bedtime.       Review and/or summary of lab tests, radiology,  procedures, medications. Review and summary of old records and obtaining of history. The risks and benefits of my recommendations, as well as other treatment options were discussed with the patient today. Questions were answered.    Follow-up: Return in about 2 months (around 10/29/2023), or if symptoms worsen or fail to improve.     * Surgery not found *      This document has been electronically signed by Abel Laboy PA-C on September 12, 2023 20:04 CDT      Results for orders placed or performed in visit on 04/03/23   YAKOV by IFA, Reflex 9-biomarkers profile    Specimen: Blood   Result Value Ref Range    YAKOV Negative     Please note Comment    Sedimentation Rate    Specimen: Blood   Result Value Ref Range    Sed Rate 7 0 - 20 mm/hr   C-reactive Protein    Specimen: Blood   Result Value Ref Range    C-Reactive Protein 0.31 0.00 - 0.50 mg/dL   Lipase    Specimen: Blood   Result Value Ref Range    Lipase 41 13 - 60 U/L   Amylase    Specimen: Blood   Result Value Ref Range    Amylase 81 30 - 110 U/L   Comprehensive Metabolic Panel    Specimen: Blood   Result Value Ref Range    Glucose 119 (H) 70 - 99 mg/dL    BUN 16 7 - 23 mg/dL    Creatinine 0.60 0.52 - 1.04 mg/dL    Sodium 140 137 - 145 mmol/L    Potassium 4.1 3.4 - 5.0 mmol/L    Chloride 103 101 - 112 mmol/L    CO2 27.0 22.0 - 30.0 mmol/L    Calcium 9.8 8.4 - 10.2 mg/dL    Total Protein 7.3 6.3 - 8.6 g/dL    Albumin 4.3 3.5 - 5.0 g/dL    ALT (SGPT) 24 <=35 U/L    AST (SGOT) 24 14 - 36 U/L    Alkaline Phosphatase 95 38 - 126 U/L    Total Bilirubin 0.3 0.2 - 1.3 mg/dL    Globulin 3.0 2.3 - 3.5 gm/dL    A/G Ratio 1.4 1.1 - 1.8 g/dL    BUN/Creatinine Ratio 26.7 (H) 7.0 - 25.0    Anion Gap 10.0 5.0 - 15.0 mmol/L    eGFR 102.3 >60.0 mL/min/1.73   Results for orders placed or performed during the hospital encounter of 03/22/23   TISSUE EXAM, [facility] ([facility])    Specimen: Small Intestine, Jejunum; Tissue   Result Value Ref Range    Reference Lab Report        [facility]  [address] [place] [address]  Phone: [phone] or [phone]  Fax: [phone]  Jared Ledezma M.D., Medical Director    PATIENT NAME                                     LABORATORY NO.  [id number]   JEANNA PEÑA.                              [id number]  [id number]                                 AGE                    SEX   SSN              CLIENT REF #  [facility]                   63        1960      F     [id number]      [id number]    [place]                               REQUESTING M.D.           ATTENDING M.D.         COPY TO.  [address] [place] [address]                         [place] [address]                     DATE COLLECTED            DATE RECEIVED          DATE REPORTED  2023    DIAGNOSIS:  JEJUNUM, BIOPSY:  No significant histologic abnormality    JBS/pah    CLINICAL HISTORY:  Generalized  abdominal pain, mass of small intestine, abnormal finding on GI  tract imaging    SPECIMENS RECEIVED:  JEJUNUM, BIOPSY    MICROSCOPIC DESCRIPTION:  Tissue blocks are prepared and slides are examined microscopically on all  specimens. See diagnosis for details.    Professional interpretation rendered by Raphael Noe M.D. at [facility],  [email or web address], [address] [place] [address] , [place] [address].    GROSS DESCRIPTION:  Labeled jejunum are multiple portions of tan soft tissue measuring 0.6 x 0.5 x  0.2 cm in aggregate, submitted entirely in 1 block.  [facility]    REVIEWED, DIAGNOSED AND ELECTRONICALLY  SIGNED BY:    Raphael Noe M.D.  CPT CODES:  48076     POC Glucose Once    Specimen: Blood   Result Value Ref Range    Glucose 86 70 - 130 mg/dL   Results for orders placed or performed in visit on 23   Comprehensive Metabolic Panel    Specimen: Blood   Result Value Ref Range    Glucose 137 (H) 70 - 99 mg/dL    BUN 12 7 - 23 mg/dL    Creatinine  0.59 0.52 - 1.04 mg/dL    Sodium 139 137 - 145 mmol/L    Potassium 3.9 3.4 - 5.0 mmol/L    Chloride 101 101 - 112 mmol/L    CO2 29.0 22.0 - 30.0 mmol/L    Calcium 9.5 8.4 - 10.2 mg/dL    Total Protein 6.8 6.3 - 8.6 g/dL    Albumin 4.1 3.5 - 5.0 g/dL    ALT (SGPT) 28 <=35 U/L    AST (SGOT) 26 14 - 36 U/L    Alkaline Phosphatase 71 38 - 126 U/L    Total Bilirubin 0.4 0.2 - 1.3 mg/dL    Globulin 2.7 2.3 - 3.5 gm/dL    A/G Ratio 1.5 1.1 - 1.8 g/dL    BUN/Creatinine Ratio 20.3 7.0 - 25.0    Anion Gap 9.0 5.0 - 15.0 mmol/L    eGFR 103.3 >60.0 mL/min/1.73   Results for orders placed or performed in visit on 02/01/23   Alpha-Gal IgE Panel    Specimen: Blood   Result Value Ref Range    Class Description Comment     IgE 167 6 - 495 IU/mL    [id number] Alpha-Gal 1.65 (A) Class III kU/L    Beef 0.11 (A) Class 0/I kU/L    Pork <0.10 Class 0 kU/L    Lamb 0.11 (A) Class 0/I kU/L   Glia(IgA / G) & TTG(IgA / G)    Specimen: Blood   Result Value Ref Range    Gliadin Deamidated Peptide Ab, IgA 5 0 - 19 units    Deaminated Gliadin Ab IgG 8 0 - 19 units    Tissue Transglutaminase IgA <2 0 - 3 U/mL    Tissue Transglutaminase IgG 5 0 - 5 U/mL   Allergens (12) Foods    Specimen: Blood   Result Value Ref Range    Class Description Comment     Egg White <0.10 Class 0 kU/L    Milk, Cow's <0.10 Class 0 kU/L    CodFish <0.10 Class 0 kU/L    Sesame Seed <0.10 Class 0 kU/L    Peanut <0.10 Class 0 kU/L    Soybean <0.10 Class 0 kU/L    Hazelnut <0.10 Class 0 kU/L    Shrimp <0.10 Class 0 kU/L    Scallop <0.10 Class 0 kU/L    Gluten <0.10 Class 0 kU/L    Hudson <0.10 Class 0 kU/L    Wheat <0.10 Class 0 kU/L   Chromogranin A    Specimen: Blood   Result Value Ref Range    CHROMOGRANIN A 597.9 (H) 0.0 - 101.8 ng/mL   AFP Tumor Marker    Specimen: Blood   Result Value Ref Range    ALPHA-FETOPROTEIN 5.86 0 - 8.3 ng/mL   5 HIAA, Urine, Quantitative, 24 Hour - Urine, Clean Catch    Specimen: Urine, Clean Catch; 24 Hour Urine   Result Value Ref Range     5-HIAA, Urine 4.7 Undefined mg/L    5-HIAA, 24H Ur 4.0 0.0 - 14.9 mg/24 hr   Gastrin    Specimen: Blood   Result Value Ref Range    Gastrin 159 (H) 0 - 115 pg/mL   CEA    Specimen: Blood   Result Value Ref Range    CEA 2.34 ng/mL   Results for orders placed or performed during the hospital encounter of 12/22/21   Tissue Pathology Exam    Specimen: A: Small Intestine, Duodenum; Tissue    B: Gastric, Antrum; Tissue    C: Esophagus, Distal; Tissue    D: Large Intestine; Tissue   Result Value Ref Range    Case Report       Surgical Pathology Report                         Case: [id number]                                  Authorizing Provider:  Marshal Staley MD        Collected:           12/22/2021 11:27 AM          Ordering Location:     [facility]   Received:            12/22/2021 01:12 PM                                 [facility]                                                     Pathologist:           Raphael Noe MD                                                           Specimens:   1) - Small Intestine, Duodenum, small bowel bx                                                      2) - Gastric, Antrum, antrum bx                                                                     3) - Esophagus, Distal, distal esophagus bx                                                         4) - Large Intestine, colonic mucosa bx                                                    Final Diagnosis       SEE SCANNED REPORT       POC Glucose Once    Specimen: Blood   Result Value Ref Range    Glucose 79 70 - 130 mg/dL   Results for orders placed or performed in visit on 12/20/21   COVID-19, [facility] IN-HOUSE, NP SWAB IN TRANSPORT MEDIA 8-10 HR TAT - Swab, Nasopharynx    Specimen: Nasopharynx; Swab   Result Value Ref Range    COVID19 Not Detected Not Detected - Ref. Range   Results for orders placed or performed in visit on 05/01/18   Alpha - Gal Panel    Specimen: Blood   Result Value Ref Range     Beef 0.16 <0.35 kU/L    Class Description 0/1     Lamb <0.10 <0.35 kU/L    Class Interpretation 0     Pork <0.10 <0.35 kU/L    Class Interpretation 0     Alpha Gal IgE 1.34 (H) <0.35 kU/L   Results for orders placed or performed during the hospital encounter of 12/06/17   Tissue Pathology Exam - Tissue, Small Intestine, Duodenum    Specimen: A: Small Intestine, Duodenum; Tissue    B: Gastric, Antrum; Tissue    C: Small Intestine, Ileum; Tissue    D: Large Intestine; Tissue   Result Value Ref Range    Case Report       Surgical Pathology Report                         Case: [id number]                                  Authorizing Provider:  Marshal Staley MD         Collected:           12/06/2017 10:50 AM          Ordering Location:     [facility]             Received:            12/06/2017 12:01 PM                                 [facility]                                                     Pathologist:           Jared Kelley MD                                                          Specimens:   1) - Small Intestine, Duodenum, small bowel bx                                                      2) - Gastric, Antrum, antrum bx                                                                     3) - Small Intestine, Ileum, ti bx                                                                  4) - Large Intestine, colonic mucosa bx                                                    Final Diagnosis       1.  MUCOSA, DUODENUM:  NO SIGNIFICANT HISTOLOGIC ABNORMALITY.    2.  MUCOSA, ANTRUM OF STOMACH:  CHRONIC GASTRITIS WITH BENIGN LYMPHOID AGGREGATES AND CONGESTION.  NEGATIVE FOR HELICOBACTER PYLORI (HP IMMUNOSTAIN).    3.  MUCOSA, TERMINAL ILEUM:  NO SIGNIFICANT HISTOLOGIC ABNORMALITY.    4.  MUCOSA, COLON:  NO SIGNIFICANT HISTOLOGIC ABNORMALITY.      Comment       Helicobacter pylori (HP) immunostain is performed because an appropriate inflammatory milieu is present and organisms are not  seen on H & E stained slides.    HP immunostain was developed and [facility].  It has not been cleared or approved by the U.S. Food and Drug Administration.  The FDA has determined that such clearance or approval is not necessary.  This test is used for clinical purposes.  It should not be regarded as investigational or for research.  This laboratory is certified under the Clinical Laboratory Improvement Amendments of 1988 (CLIA-88) as qualified to perform high complexity clinical laboratory testing.            Gross Description       Received for examination are 4 containers, each of which have nodular bits of white soft tissue measuring 0.3-0.5 cc in aggregate.  All specimens are embedded as labeled.  1A duodenum; 2A antrum of stomach; 3A terminal ileum; 4A mucosa of colon.      Embedded Images     POC Glucose Fingerstick    Specimen: Blood   Result Value Ref Range    Glucose 81 70 - 130 mg/dL   Results for orders placed or performed in visit on 11/28/17   Gastrointestinal Panel, PCR - Stool, Per Rectum    Specimen: Per Rectum; Stool   Result Value Ref Range    Campylobacter Not Detected Not Detected    Clostridium difficile (toxin A/B) Not Detected Not Detected, NA formed stool, C diff not applicable on patient less than one year of age    Plesiomonas shigelloides Not Detected Not Detected    Salmonella Not Detected Not Detected    Vibrio Not Detected Not Detected    Vibrio cholerae Not Detected Not Detected    Yersinia enterocolitica Not Detected Not Detected    Enteroaggregative E. coli (EAEC) Not Detected Not Detected    Enteropathogenic E. coli (EPEC) Not Detected Not Detected    Enterotoxigenic E. coli (ETEC) lt/st Not Detected Not Detected    Shiga-like toxin-producing E. coli (STEC) stx1/stx2 Not Detected Not Detected    E. coli O157 Not Detected Not Detected    Shigella/Enteroinvasive E. coli (EIEC) Not Detected Not  Detected    Cryptosporidium Not Detected Not Detected    Cyclospora cayetanensis Not Detected Not Detected    Entamoeba histolytica Not Detected Not Detected    Giardia lamblia Not Detected Not Detected    Adenovirus F40/41 Not Detected Not Detected    Astrovirus Not Detected Not Detected    Norovirus GI/GII Not Detected Not Detected    Rotavirus A Not Detected Not Detected    Sapovirus (I, II, IV or V) Not Detected Not Detected     *Note: Due to a large number of results and/or encounters for the requested time period, some results have not been displayed. A complete set of results can be found in Results Review.

## 2023-08-31 RX ORDER — FAMOTIDINE 40 MG/1
TABLET, FILM COATED ORAL
Qty: 1 TABLET | Refills: 0 | OUTPATIENT
Start: 2023-08-31

## 2023-09-27 RX ORDER — OMEPRAZOLE 20 MG/1
20 TABLET, DELAYED RELEASE ORAL 2 TIMES DAILY
Qty: 168 TABLET | Refills: 4 | Status: SHIPPED | OUTPATIENT
Start: 2023-09-27

## 2023-09-27 RX ORDER — OMEPRAZOLE 20 MG/1
20 TABLET, DELAYED RELEASE ORAL 2 TIMES DAILY
Qty: 168 TABLET | Refills: 4 | Status: CANCELLED | OUTPATIENT
Start: 2023-09-27

## (undated) DEVICE — CANN SMPL SOFTECH BIFLO ETCO2 A/M 7FT

## (undated) DEVICE — BITEBLOCK ENDO W/STRAP 60F A/ LF DISP

## (undated) DEVICE — SINGLE-USE BIOPSY FORCEPS: Brand: RADIAL JAW 4